# Patient Record
Sex: MALE | Race: WHITE | Employment: OTHER | URBAN - METROPOLITAN AREA
[De-identification: names, ages, dates, MRNs, and addresses within clinical notes are randomized per-mention and may not be internally consistent; named-entity substitution may affect disease eponyms.]

---

## 2017-01-09 ENCOUNTER — HOSPITAL ENCOUNTER (OUTPATIENT)
Dept: LAB | Facility: CLINIC | Age: 82
Discharge: HOME | End: 2017-01-09
Attending: UROLOGY | Admitting: UROLOGY
Payer: COMMERCIAL

## 2017-01-09 DIAGNOSIS — C61 PROSTATE CANCER (H): ICD-10-CM

## 2017-01-09 LAB — PSA SERPL-MCNC: 0.04 UG/L (ref 0–4)

## 2017-01-09 PROCEDURE — 84153 ASSAY OF PSA TOTAL: CPT | Performed by: UROLOGY

## 2017-01-09 PROCEDURE — 36415 COLL VENOUS BLD VENIPUNCTURE: CPT | Performed by: UROLOGY

## 2017-01-11 ENCOUNTER — OFFICE VISIT (OUTPATIENT)
Dept: UROLOGY | Facility: CLINIC | Age: 82
End: 2017-01-11
Payer: COMMERCIAL

## 2017-01-11 VITALS
HEART RATE: 60 BPM | DIASTOLIC BLOOD PRESSURE: 78 MMHG | HEIGHT: 69 IN | SYSTOLIC BLOOD PRESSURE: 120 MMHG | BODY MASS INDEX: 34.36 KG/M2 | WEIGHT: 232 LBS

## 2017-01-11 DIAGNOSIS — C61 MALIGNANT NEOPLASM OF PROSTATE (H): Primary | ICD-10-CM

## 2017-01-11 PROCEDURE — 99213 OFFICE O/P EST LOW 20 MIN: CPT | Performed by: UROLOGY

## 2017-01-11 RX ORDER — DOCUSATE CALCIUM 240 MG
240 CAPSULE ORAL DAILY PRN
COMMUNITY
Start: 2015-01-20

## 2017-01-11 NOTE — Clinical Note
1/11/2017       RE: Raul AGRAWAL Carisa  [address]  [address]  [facility] [id number]     Dear Colleague,    Thank you for referring your patient, Raul Younger, to the [facility] [place] at [facility]. Please see a copy of my visit note below.    Office Visit Note  [facility]  [phone]    UROLOGIC DIAGNOSES:   Stanley 4+4=8 prostate cancer    CURRENT INTERVENTIONS:   S/P prostate cryoablation, on flomax and finasteride    HISTORY:   This is a 81-year-old gentleman who previously saw Dr. Nicolas and was treated with cryoablation of the prostate for low volume Melia 4+4 = 8 prostate cancer in 2010.  He has done well with that procedure.  His PSA was undetectable until July 2016 when it became 0.05. Currently it is 0.04. He has stopped taking flomax and has seen no difference in his urinary symptoms. He continues to take finasteride. He has no urinary complaints at this time.      PAST MEDICAL HISTORY:   Past Medical History   Diagnosis Date     Other and unspecified hyperlipidemia      Abstracted 5/15/02.     Migraine, unspecified, without mention of intractable migraine without mention of status migrainosus      Abstracted 5/15/02.     Sleep apnea      Other and unspecified hyperlipidemia      MI, old      vs. asthma per pt.'s wife     Arthritis      HTN      Malignant neoplasm (H) 01/2011     Prostate cancer       PAST SURGICAL HISTORY:   Past Surgical History   Procedure Laterality Date     C nonspecific procedure  1996     Paraesophageal Hernia Repair. Abstracted 5/15/02.     C nonspecific procedure  1943     Tonsillectomy. Abstracted 5/15/02.     Prostate surgery  1/2010     Genitourinary surgery       Orthopedic surgery       Head & neck surgery       Ent surgery       Phacoemulsification clear cornea with toric intraocular lens implant  5/23/2012     Procedure:PHACOEMULSIFICATION CLEAR CORNEA WITH  TORIC INTRAOCULAR LENS IMPLANT; RIGHT PHACOEMULSIFICATION CLEAR CORNEA WITH  DELUXE TORIC INTRAOCULAR LENS IMPLANT; Surgeon:ANDRA LAGUNAS; Location: [facility]     Phacoemulsification clear cornea with toric intraocular lens implant  5/30/2012     Procedure:PHACOEMULSIFICATION CLEAR CORNEA WITH TORIC INTRAOCULAR LENS IMPLANT; LEFT PHACOEMULSIFICATION CLEAR CORNEA WITH TORIC INTRAOCULAR LENS IMPLANT ; Surgeon:ANDRA LAGUNAS; Location: [facility]     Vitrectomy parsplana with 23 gauge system  1/11/2013     Procedure: VITRECTOMY PARSPLANA WITH 23 GAUGE SYSTEM;  LEFT 23 GAUGE VITRECTOMY, EPIRETINAL MEMBRANE REMOVAL, AIR FLUID EXCHANGE ;  Surgeon: Lawson Celeste MD;  Location:  [facility]     Vitrectomy parsplana with 23 gauge system  2/4/2014     Procedure: VITRECTOMY PARSPLANA WITH 23 GAUGE SYSTEM;  RIGHT VITRECTOMY PARSPLANA WITH 23 GAUGE SYSTEM, EPIRETINAL MEMBRANE REMOVAL, AIR/FLUID EXCHANGE ;  Surgeon: Lawson Celeste MD;  Location:  [facility]     Irrigation and debridement hip, combined Left 11/7/2015     Procedure: COMBINED IRRIGATION AND DEBRIDEMENT HIP;  Surgeon: Stanislav Maharaj MD;  Location: [facility]       FAMILY HISTORY:   Family History   Problem Relation Age of Onset     Neurologic Disorder Father      als     C.A.D. Father      HEART DISEASE Father        SOCIAL HISTORY:   Social History   Substance Use Topics     Smoking status: Former Smoker -- 1.00 packs/day for 10 years     Types: Cigarettes, Pipe     Quit date: 01/01/1972     Smokeless tobacco: Never Used     Alcohol Use: No      Comment: occ wine       Current Outpatient Prescriptions   Medication     docusate calcium (SURFAK) 240 MG capsule     finasteride (PROSCAR) 5 MG tablet     amLODIPine (NORVASC) 5 MG tablet     triamterene-hydrochlorothiazide (MAXZIDE-25) 37.5-25 MG per tablet     simvastatin (ZOCOR) 20 MG tablet     tiZANidine (ZANAFLEX) 4 MG tablet     InFLIXimab (REMICADE IV)     clotrimazole (LOTRIMIN) 1 % cream     acetaminophen (TYLENOL) 500 MG  tablet     methotrexate 2.5 MG tablet     fexofenadine (ALLEGRA) 180 MG tablet     Multiple Vitamins-Minerals (PRESERVISION AREDS PO)     aspirin 81 MG tablet     Menthol, Topical Analgesic, 2 % GEL     naproxen sodium (ALEVE) 220 MG tablet     traMADol (ULTRAM) 50 MG tablet     tamsulosin (FLOMAX) 0.4 MG 24 hr capsule     folic acid (FOLVITE) 1 MG tablet     finasteride (PROSCAR) 5 MG tablet     No current facility-administered medications for this visit.         PHYSICAL EXAM:    There were no vitals taken for this visit.    HEENT: Normocephalic and atraumatic   Cardiac: Not done  Back/Flank: Not done  CNS/PNS: Not done  Respiratory: Normal non-labored breathing  Abdomen: Soft nontender and nondistended  Peripheral Vascular: Not done  Mental Status: Not done    Penis: Not done  Scrotal Skin: Not done  Testicles: Not done  Epididymis: Not done  Digital Rectal Exam:     Cystoscopy: Not done    Imaging: None    Urinalysis: UA RESULTS:  Recent Labs   Lab Test  12/08/16   1530   COLOR  Yellow   APPEARANCE  Clear   URINEGLC  Negative   URINEBILI  Negative   URINEKETONE  Negative   SG  1.020   UBLD  Negative   URINEPH  6.5   PROTEIN  Negative   UROBILINOGEN  1.0   NITRITE  Negative   LEUKEST  Negative   RBCU  O - 2   WBCU  O - 2       PSA: 0.04    Post Void Residual:     Other labs: None today      IMPRESSION:  Low PSA    PLAN:  His PSA remains detectable but very low. I recommended that we continue to follow this closely. I will see him back in 6 months for his next PSA.    Total Time: 15 min                                      Total in Consultation: 15 min      Bhavin Celeste M.D.

## 2017-01-11 NOTE — PROGRESS NOTES
Office Visit Note  [facility]  [phone]    UROLOGIC DIAGNOSES:   [place] 4+4=8 prostate cancer    CURRENT INTERVENTIONS:   S/P prostate cryoablation, on flomax and finasteride    HISTORY:   This is a 81-year-old gentleman who previously saw Dr. Nicolas and was treated with cryoablation of the prostate for low volume [place] 4+4 = 8 prostate cancer in 2010.  He has done well with that procedure.  His PSA was undetectable until July 2016 when it became 0.05. Currently it is 0.04. He has stopped taking flomax and has seen no difference in his urinary symptoms. He continues to take finasteride. He has no urinary complaints at this time.      PAST MEDICAL HISTORY:   Past Medical History   Diagnosis Date     Other and unspecified hyperlipidemia      Abstracted 5/15/02.     Migraine, unspecified, without mention of intractable migraine without mention of status migrainosus      Abstracted 5/15/02.     Sleep apnea      Other and unspecified hyperlipidemia      MI, old      vs. asthma per pt.'s wife     Arthritis      HTN      Malignant neoplasm (H) 01/2011     Prostate cancer       PAST SURGICAL HISTORY:   Past Surgical History   Procedure Laterality Date     C nonspecific procedure  1996     Paraesophageal Hernia Repair. Abstracted 5/15/02.     C nonspecific procedure  1943     Tonsillectomy. Abstracted 5/15/02.     Prostate surgery  1/2010     Genitourinary surgery       Orthopedic surgery       Head & neck surgery       Ent surgery       Phacoemulsification clear cornea with toric intraocular lens implant  5/23/2012     Procedure:PHACOEMULSIFICATION CLEAR CORNEA WITH TORIC INTRAOCULAR LENS IMPLANT; RIGHT PHACOEMULSIFICATION CLEAR CORNEA WITH  DELUXE TORIC INTRAOCULAR LENS IMPLANT; Surgeon:ANDRA LAGUNAS; Location:[facility]     Phacoemulsification clear cornea with toric intraocular lens implant  5/30/2012     Procedure:PHACOEMULSIFICATION CLEAR CORNEA WITH TORIC INTRAOCULAR LENS IMPLANT; LEFT  PHACOEMULSIFICATION CLEAR CORNEA WITH TORIC INTRAOCULAR LENS IMPLANT ; Surgeon:ANDRA LAGUNAS; Location: [facility]     Vitrectomy parsplana with 23 gauge system  1/11/2013     Procedure: VITRECTOMY PARSPLANA WITH 23 GAUGE SYSTEM;  LEFT 23 GAUGE VITRECTOMY, EPIRETINAL MEMBRANE REMOVAL, AIR FLUID EXCHANGE ;  Surgeon: Lawson Celeste MD;  Location: [facility]     Vitrectomy parsplana with 23 gauge system  2/4/2014     Procedure: VITRECTOMY PARSPLANA WITH 23 GAUGE SYSTEM;  RIGHT VITRECTOMY PARSPLANA WITH 23 GAUGE SYSTEM, EPIRETINAL MEMBRANE REMOVAL, AIR/FLUID EXCHANGE ;  Surgeon: Lawson Celeste MD;  Location:  [facility]     Irrigation and debridement hip, combined Left 11/7/2015     Procedure: COMBINED IRRIGATION AND DEBRIDEMENT HIP;  Surgeon: Stanislav Maharaj MD;  Location: [facility]       FAMILY HISTORY:   Family History   Problem Relation Age of Onset     Neurologic Disorder Father      als     C.A.D. Father      HEART DISEASE Father        SOCIAL HISTORY:   Social History   Substance Use Topics     Smoking status: Former Smoker -- 1.00 packs/day for 10 years     Types: Cigarettes, Pipe     Quit date: 01/01/1972     Smokeless tobacco: Never Used     Alcohol Use: No      Comment: occ wine       Current Outpatient Prescriptions   Medication     docusate calcium (SURFAK) 240 MG capsule     finasteride (PROSCAR) 5 MG tablet     amLODIPine (NORVASC) 5 MG tablet     triamterene-hydrochlorothiazide (MAXZIDE-25) 37.5-25 MG per tablet     simvastatin (ZOCOR) 20 MG tablet     tiZANidine (ZANAFLEX) 4 MG tablet     InFLIXimab (REMICADE IV)     clotrimazole (LOTRIMIN) 1 % cream     acetaminophen (TYLENOL) 500 MG tablet     methotrexate 2.5 MG tablet     fexofenadine (ALLEGRA) 180 MG tablet     Multiple Vitamins-Minerals (PRESERVISION AREDS PO)     aspirin 81 MG tablet     Menthol, Topical Analgesic, 2 % GEL     naproxen sodium (ALEVE) 220 MG tablet     traMADol (ULTRAM) 50 MG tablet     tamsulosin (FLOMAX) 0.4 MG 24 hr capsule     folic acid  (FOLVITE) 1 MG tablet     finasteride (PROSCAR) 5 MG tablet     No current facility-administered medications for this visit.         PHYSICAL EXAM:    There were no vitals taken for this visit.    HEENT: Normocephalic and atraumatic   Cardiac: Not done  Back/Flank: Not done  CNS/PNS: Not done  Respiratory: Normal non-labored breathing  Abdomen: Soft nontender and nondistended  Peripheral Vascular: Not done  Mental Status: Not done    Penis: Not done  Scrotal Skin: Not done  Testicles: Not done  Epididymis: Not done  Digital Rectal Exam:     Cystoscopy: Not done    Imaging: None    Urinalysis: UA RESULTS:  Recent Labs   Lab Test  12/08/16   1530   COLOR  Yellow   APPEARANCE  Clear   URINEGLC  Negative   URINEBILI  Negative   URINEKETONE  Negative   SG  1.020   UBLD  Negative   URINEPH  6.5   PROTEIN  Negative   UROBILINOGEN  1.0   NITRITE  Negative   LEUKEST  Negative   RBCU  O - 2   WBCU  O - 2       PSA: 0.04    Post Void Residual:     Other labs: None today      IMPRESSION:  Low PSA    PLAN:  His PSA remains detectable but very low. I recommended that we continue to follow this closely. I will see him back in 6 months for his next PSA.    Total Time: 15 min                                      Total in Consultation: 15 min      Bhavin Celeste M.D.

## 2017-03-22 ENCOUNTER — OFFICE VISIT (OUTPATIENT)
Dept: INTERNAL MEDICINE | Facility: CLINIC | Age: 82
End: 2017-03-22
Payer: COMMERCIAL

## 2017-03-22 ENCOUNTER — MEDICAL CORRESPONDENCE (OUTPATIENT)
Dept: HEALTH INFORMATION MANAGEMENT | Facility: CLINIC | Age: 82
End: 2017-03-22

## 2017-03-22 VITALS
WEIGHT: 232 LBS | BODY MASS INDEX: 34.36 KG/M2 | RESPIRATION RATE: 14 BRPM | HEART RATE: 71 BPM | OXYGEN SATURATION: 97 % | DIASTOLIC BLOOD PRESSURE: 68 MMHG | TEMPERATURE: 98.2 F | HEIGHT: 69 IN | SYSTOLIC BLOOD PRESSURE: 128 MMHG

## 2017-03-22 DIAGNOSIS — C61 PROSTATE CANCER (H): ICD-10-CM

## 2017-03-22 DIAGNOSIS — B35.6 TINEA CRURIS: ICD-10-CM

## 2017-03-22 DIAGNOSIS — Z00.00 ROUTINE GENERAL MEDICAL EXAMINATION AT A HEALTH CARE FACILITY: Primary | ICD-10-CM

## 2017-03-22 DIAGNOSIS — M06.9 RHEUMATOID ARTHRITIS INVOLVING MULTIPLE SITES, UNSPECIFIED RHEUMATOID FACTOR PRESENCE: ICD-10-CM

## 2017-03-22 DIAGNOSIS — G47.33 OSA (OBSTRUCTIVE SLEEP APNEA): ICD-10-CM

## 2017-03-22 DIAGNOSIS — I10 ESSENTIAL HYPERTENSION, BENIGN: ICD-10-CM

## 2017-03-22 DIAGNOSIS — E78.5 HYPERLIPIDEMIA LDL GOAL <130: ICD-10-CM

## 2017-03-22 LAB
ERYTHROCYTE [DISTWIDTH] IN BLOOD BY AUTOMATED COUNT: 14.6 % (ref 10–15)
HCT VFR BLD AUTO: 38.6 % (ref 40–53)
HGB BLD-MCNC: 12.6 G/DL (ref 13.3–17.7)
MCH RBC QN AUTO: 30.7 PG (ref 26.5–33)
MCHC RBC AUTO-ENTMCNC: 32.6 G/DL (ref 31.5–36.5)
MCV RBC AUTO: 94 FL (ref 78–100)
PLATELET # BLD AUTO: 222 10E9/L (ref 150–450)
RBC # BLD AUTO: 4.11 10E12/L (ref 4.4–5.9)
WBC # BLD AUTO: 5.4 10E9/L (ref 4–11)

## 2017-03-22 PROCEDURE — 80053 COMPREHEN METABOLIC PANEL: CPT | Performed by: INTERNAL MEDICINE

## 2017-03-22 PROCEDURE — 99397 PER PM REEVAL EST PAT 65+ YR: CPT | Performed by: INTERNAL MEDICINE

## 2017-03-22 PROCEDURE — 84443 ASSAY THYROID STIM HORMONE: CPT | Performed by: INTERNAL MEDICINE

## 2017-03-22 PROCEDURE — 85027 COMPLETE CBC AUTOMATED: CPT | Performed by: INTERNAL MEDICINE

## 2017-03-22 PROCEDURE — 80061 LIPID PANEL: CPT | Performed by: INTERNAL MEDICINE

## 2017-03-22 PROCEDURE — 36415 COLL VENOUS BLD VENIPUNCTURE: CPT | Performed by: INTERNAL MEDICINE

## 2017-03-22 RX ORDER — CLOTRIMAZOLE 1 %
CREAM (GRAM) TOPICAL 2 TIMES DAILY
Qty: 30 G | Refills: 1 | Status: SHIPPED | OUTPATIENT
Start: 2017-03-22 | End: 2017-09-20

## 2017-03-22 NOTE — PATIENT INSTRUCTIONS
Preventive Health Recommendations:       Male Ages 65 and over    Yearly exam:             See your health care provider every year in order to  o   Review health changes.   o   Discuss preventive care.    o   Review your medicines if your doctor has prescribed any.    Talk with your health care provider about whether you should have a test to screen for prostate cancer (PSA).    Every 3 years, have a diabetes test (fasting glucose). If you are at risk for diabetes, you should have this test more often.    Every 5 years, have a cholesterol test. Have this test more often if you are at risk for high cholesterol or heart disease.     Every 10 years, have a colonoscopy. Or, have a yearly FIT test (stool test). These exams will check for colon cancer.    Talk to with your health care provider about screening for Abdominal Aortic Aneurysm if you have a family history of AAA or have a history of smoking.  Shots:     Get a flu shot each year.     Get a tetanus shot every 10 years.     Talk to your doctor about your pneumonia vaccines. There are now two you should receive - Pneumovax (PPSV 23) and Prevnar (PCV 13).    Talk to your doctor about a shingles vaccine.     Talk to your doctor about the hepatitis B vaccine.  Nutrition:     Eat at least 5 servings of fruits and vegetables each day.     Eat whole-grain bread, whole-wheat pasta and brown rice instead of white grains and rice.     Talk to your doctor about Calcium and Vitamin D.   Lifestyle    Exercise for at least 150 minutes a week (30 minutes a day, 5 days a week). This will help you control your weight and prevent disease.     Limit alcohol to one drink per day.     No smoking.     Wear sunscreen to prevent skin cancer.     See your dentist every six months for an exam and cleaning.     See your eye doctor every 1 to 2 years to screen for conditions such as glaucoma, macular degeneration and cataracts.      Everything looks fine!    Refills of medications have  been faxed to your pharmacy.     I'll get back to you with lab results soon, especially if there is anything of concern.      See you in a year, sooner if problems.

## 2017-03-22 NOTE — PROGRESS NOTES
SUBJECTIVE:                                                            Raul Younger is a 81 year old male who presents for Preventive Visit.    Specialists:   He sees Dr. Roach for his RA.  He sees Dr. Celeste for his prostate cancer every 6 months.     RA: He notes his RA started in his wrists and traveled to his left hip. He expresses some interest in considering a second rheumatology opinion.   Medical Hx: His last hospitalization was in November 11/2015 for a RA flare up in the left hip.   Exercise: He participates in structured exercises 3 days/week and the other 3 days he uses the bike or treadmill.   Other: Patient uses Vicks on his feet in order to help with his sinus problems and on his toenail for a fungus.   Are you in the first 12 months of your Medicare Part B coverage?  No    Healthy Habits:    Do you get at least three servings of calcium containing foods daily (dairy, green leafy vegetables, etc.)? yes    Amount of exercise or daily activities, outside of work: 6 day(s) per week    Problems taking medications regularly No    Medication side effects: No    Have you had an eye exam in the past two years? yes    Do you see a dentist twice per year? yes    Do you have sleep apnea, excessive snoring or daytime drowsiness?yes, uses CPAP    COGNITIVE SCREEN  1) Repeat 3 items (Banana, Sunrise, Chair)    2) Clock draw: NORMAL  3) 3 item recall: Recalls 3 objects  Results: 3 items recalled: COGNITIVE IMPAIRMENT LESS LIKELY    Mini-CogTM Copyright S Trever. Licensed by the author for use in [facility]; reprinted with permission (sarah [email or web address]). All rights reserved.      Reviewed and updated as needed this visit by clinical staff  Tobacco  Allergies  Meds  Med Hx  Surg Hx  Fam Hx  Soc Hx        Reviewed and updated as needed this visit by Provider    Social History   Substance Use Topics     Smoking status: Former Smoker     Packs/day: 1.00     Years: 10.00     Types:  Cigarettes, Pipe     Quit date: 1/1/1972     Smokeless tobacco: Never Used     Alcohol use No      Comment: occ wine       The patient does not drink >3 drinks per day nor >7 drinks per week.    Today's PHQ-2 Score:   PHQ-2 ( 1999 Pfizer) 12/1/2016 11/30/2016   Q1: Little interest or pleasure in doing things 0 0   Q2: Feeling down, depressed or hopeless 0 0   PHQ-2 Score 0 0     Do you feel safe in your environment - Yes    Do you have a Health Care Directive?: Yes: Patient states has Advance Directive and will bring in a copy to clinic.    Current providers sharing in care for this patient include:   Patient Care Team:  Wilmer [facility] as PCP - General (Internal Medicine)      Hearing impairment: Yes    Ability to successfully perform activities of daily living: Yes, no assistance needed     Fall risk:  Fallen 2 or more times in the past year?: No  Any fall with injury in the past year?: No    Home safety:  none identified    The following health maintenance items are reviewed in Epic and correct as of today:  Health Maintenance   Topic Date Due     ADVANCE DIRECTIVE PLANNING Q5 YRS (NO INBASKET)  12/08/2016     FALL RISK ASSESSMENT  03/08/2017     INFLUENZA VACCINE (SYSTEM ASSIGNED)  09/01/2017     TETANUS IMMUNIZATION (SYSTEM ASSIGNED)  01/09/2018     LIPID SCREEN Q5 YR MALE (SYSTEM ASSIGNED)  03/08/2021     PNEUMOCOCCAL  Completed     ROS:  C: NEGATIVE for fever, chills, difficulty sleeping, fatigue   I: NEGATIVE for worrisome rashes, moles or lesions  E: NEGATIVE for vision changes or irritation  E/M: NEGATIVE for ear, mouth and throat problems  R: NEGATIVE for significant cough or SOB  CV: NEGATIVE for chest pain, palpitations or peripheral edema  GI: NEGATIVE for hematochezia, abdominal pain, heartburn, or change in bowel habits  : NEGATIVE for frequency, dysuria, or hematuria  M: POSITIVE for RA (specifically in wrists and left hip) NEGATIVE for other significant arthralgias or myalgia  N: POSITIVE  "for low grade headaches 1x/week. NEGATIVE for weakness, dizziness or paresthesias, abrupt changes in speech  E: NEGATIVE for temperature intolerance, skin/hair changes, unusual thirstiness   H: NEGATIVE for bleeding or bruising problems  P: NEGATIVE for changes in mood or affect    Problem list, Medication list, Allergies, and Medical/Social/Surgical histories reviewed in [facility] and updated as appropriate.  BP Readings from Last 3 Encounters:   03/22/17 128/68   01/11/17 120/78   12/08/16 128/68    Wt Readings from Last 3 Encounters:   03/22/17 105.2 kg (232 lb)   01/11/17 105.2 kg (232 lb)   12/08/16 108.9 kg (240 lb)          Past/recent records reviewed and discussed for --   Colonoscopy- Last was 12/29/2011.  Medications      OBJECTIVE:                                                            /68 (BP Location: Right arm, Patient Position: Chair, Cuff Size: Adult Large)  Pulse 71  Temp 98.2  F (36.8  C) (Oral)  Resp 14  Ht 5' 9\" (1.753 m)  Wt 232 lb (105.2 kg)  SpO2 97%  BMI 34.26 kg/m2 Estimated body mass index is 34.26 kg/(m^2) as calculated from the following:    Height as of this encounter: 5' 9\" (1.753 m).    Weight as of this encounter: 232 lb (105.2 kg).  EXAM:   GENERAL: healthy, alert and no distress, overweight   EYES: Eyes grossly normal to inspection, PERRL and conjunctivae and sclerae normal  HENT: ear canals and TM's normal, nose and mouth without ulcers or lesions  NECK: no adenopathy, no asymmetry, masses, or scars and thyroid normal to palpation  RESP: lungs clear to auscultation - no rales, rhonchi or wheezes  CV: regular rate and rhythm, normal S1 S2, no S3 or S4, no murmur, click or rub, no peripheral edema and peripheral pulses strong  ABDOMEN: soft, nontender, no hepatosplenomegaly, no masses and bowel sounds normal   (male) and RECTAL: deferred to urologist   MS: no gross musculoskeletal defects noted, no edema  SKIN: no suspicious lesions or rashes  NEURO: Normal strength " and tone, mentation intact and speech normal  PSYCH: mentation appears normal, affect normal/bright  LYMPH: no cervical or inguinal adenopathy    ASSESSMENT / PLAN:                                                            (Z00.00) Routine general medical examination at a health care facility  (primary encounter diagnosis)  Comment: Stable health. See epic orders.  Plan: CBC with platelets, TSH with free T4 reflex,         Comprehensive metabolic panel (BMP + Alb, Alk         Phos, ALT, AST, Total. Bili, TP), Lipid Profile        with reflex to direct LDL           (G47.33) MIGUEL (obstructive sleep apnea)  Comment: Continue wearing CPAP machine    (C61) Prostate cancer (H)  Comment: Continue following Urology (Dr. Celeste)     (M06.9) Rheumatoid arthritis involving multiple sites, unspecified rheumatoid factor presence (H)  Offered contact information in case patient is interested in a second opinion.   Plan: RHEUMATOLOGY REFERRAL            (E78.5) Hyperlipidemia LDL goal <130  Comment: Update lipids. Continue current meds  Plan: Comprehensive metabolic panel (BMP + Alb, Alk         Phos, ALT, AST, Total. Bili, TP), Lipid Profile        with reflex to direct LDL            (I10) Essential hypertension, benign  Comment: BP at target. Continue current meds.  Plan: amLODIPine (NORVASC) 5 Mg tablet, triamterene-hydrochlorothiazide (MAXZIDE- 25) 37.5-25 MG per tablet)     (B35.6) Tinea cruris  Plan: clotrimazole (LOTRIMIN) 1 % cream          Patient Instructions     Preventive Health Recommendations:       Male Ages 65 and over    Yearly exam:             See your health care provider every year in order to  o   Review health changes.   o   Discuss preventive care.    o   Review your medicines if your doctor has prescribed any.    Talk with your health care provider about whether you should have a test to screen for prostate cancer (PSA).    Every 3 years, have a diabetes test (fasting glucose). If you are at risk for  diabetes, you should have this test more often.    Every 5 years, have a cholesterol test. Have this test more often if you are at risk for high cholesterol or heart disease.     Every 10 years, have a colonoscopy. Or, have a yearly FIT test (stool test). These exams will check for colon cancer.    Talk to with your health care provider about screening for Abdominal Aortic Aneurysm if you have a family history of AAA or have a history of smoking.  Shots:     Get a flu shot each year.     Get a tetanus shot every 10 years.     Talk to your doctor about your pneumonia vaccines. There are now two you should receive - Pneumovax (PPSV 23) and Prevnar (PCV 13).    Talk to your doctor about a shingles vaccine.     Talk to your doctor about the hepatitis B vaccine.  Nutrition:     Eat at least 5 servings of fruits and vegetables each day.     Eat whole-grain bread, whole-wheat pasta and brown rice instead of white grains and rice.     Talk to your doctor about Calcium and Vitamin D.   Lifestyle    Exercise for at least 150 minutes a week (30 minutes a day, 5 days a week). This will help you control your weight and prevent disease.     Limit alcohol to one drink per day.     No smoking.     Wear sunscreen to prevent skin cancer.     See your dentist every six months for an exam and cleaning.     See your eye doctor every 1 to 2 years to screen for conditions such as glaucoma, macular degeneration and cataracts.    Everything looks fine!    Refills of medications have been faxed to your pharmacy.     I'll get back to you with lab results soon, especially if there is anything of concern.      See you in a year, sooner if problems.      End of Life Planning:  Patient currently has an advanced directive: Yes. Patient has advanced directive and will bring copy to clinic.     COUNSELING:  Reviewed preventive health counseling, as reflected in patient instructions       Regular exercise       Colon cancer screening    Estimated  "body mass index is 34.26 kg/(m^2) as calculated from the following:    Height as of this encounter: 1.753 m (5' 9\").    Weight as of this encounter: 105.2 kg (232 lb).  Weight management plan: Discussed healthy diet and exercise guidelines and patient will follow up in 12 months in clinic to re-evaluate.   reports that he quit smoking about 45 years ago. His smoking use included Cigarettes and Pipe. He has a 10.00 pack-year smoking history. He has never used smokeless tobacco.    Appropriate preventive services were discussed with this patient, including applicable screening as appropriate for cardiovascular disease, diabetes, osteopenia/osteoporosis, and glaucoma.  As appropriate for age/gender, discussed screening for colorectal cancer, prostate cancer, breast cancer, and cervical cancer. Checklist reviewing preventive services available has been given to the patient.    Reviewed patients plan of care and provided an AVS. The Basic Care Plan (routine screening as documented in Health Maintenance) for Raul meets the Care Plan requirement. This Care Plan has been established and reviewed with the Patient.    Counseling Resources:  ATP IV Guidelines  Pooled Cohorts Equation Calculator  Breast Cancer Risk Calculator  FRAX Risk Assessment  ICSI Preventive Guidelines  Dietary Guidelines for Americans, 2010  USDA's MyPlate  ASA Prophylaxis  Lung CA Screening    Jose D Vasquez MD  [facility]    This document serves as a record of the services and decisions personally performed and made by Jose D Vasquez MD. It was created on their behalf by Yue Martin, a trained medical scribe. The creation of this document is based the provider's statements to the medical scribe.  Yue Martin March 22, 2017 9:16 AM    "

## 2017-03-22 NOTE — MR AVS SNAPSHOT
After Visit Summary   3/22/2017    Raul Younger    MRN: [id number]           Patient Information     Date Of Birth          1935        Visit Information        Provider Department      3/22/2017 9:00 AM Jose D Vasquez MD [facility]        Today's Diagnoses     Routine general medical examination at a health care facility    -  1    MIGUEL (obstructive sleep apnea)        Prostate cancer (H)        Rheumatoid arthritis involving multiple sites, unspecified rheumatoid factor presence (H)        Hyperlipidemia LDL goal <130        Essential hypertension, benign        Tinea cruris          Care Instructions      Preventive Health Recommendations:       Male Ages 65 and over    Yearly exam:             See your health care provider every year in order to  o   Review health changes.   o   Discuss preventive care.    o   Review your medicines if your doctor has prescribed any.    Talk with your health care provider about whether you should have a test to screen for prostate cancer (PSA).    Every 3 years, have a diabetes test (fasting glucose). If you are at risk for diabetes, you should have this test more often.    Every 5 years, have a cholesterol test. Have this test more often if you are at risk for high cholesterol or heart disease.     Every 10 years, have a colonoscopy. Or, have a yearly FIT test (stool test). These exams will check for colon cancer.    Talk to with your health care provider about screening for Abdominal Aortic Aneurysm if you have a family history of AAA or have a history of smoking.  Shots:     Get a flu shot each year.     Get a tetanus shot every 10 years.     Talk to your doctor about your pneumonia vaccines. There are now two you should receive - Pneumovax (PPSV 23) and Prevnar (PCV 13).    Talk to your doctor about a shingles vaccine.     Talk to your doctor about the hepatitis B vaccine.  Nutrition:     Eat at least 5 servings of fruits and  vegetables each day.     Eat whole-grain bread, whole-wheat pasta and brown rice instead of white grains and rice.     Talk to your doctor about Calcium and Vitamin D.   Lifestyle    Exercise for at least 150 minutes a week (30 minutes a day, 5 days a week). This will help you control your weight and prevent disease.     Limit alcohol to one drink per day.     No smoking.     Wear sunscreen to prevent skin cancer.     See your dentist every six months for an exam and cleaning.     See your eye doctor every 1 to 2 years to screen for conditions such as glaucoma, macular degeneration and cataracts.      Everything looks fine!    Refills of medications have been faxed to your pharmacy.     I'll get back to you with lab results soon, especially if there is anything of concern.      See you in a year, sooner if problems.          Follow-ups after your visit        Additional Services     RHEUMATOLOGY REFERRAL       Your provider has referred you to: [facility]:  [facility] [phone]  [email or web address]    Please be aware that coverage of these services is subject to the terms and limitations of your health insurance plan.  Call member services at your health plan with any benefit or coverage questions.      Please bring the following with you to your appointment:    (1) Any X-Rays, CTs or MRIs which have been performed.  Contact the facility where they were done to arrange for  prior to your scheduled appointment.    (2) List of current medications   (3) This referral request   (4) Any documents/labs given to you for this referral                  Your next 10 appointments already scheduled     Apr 17, 2017 11:30 AM CDT   Return Sleep Patient with Yoan Maria MD   [facility] ([facility])    [address] [address]   [phone]            Jul 12, 2017  "11:00 AM CDT   Return Prostate Cancer with Bhavin Celeste MD   [facility])    [address] [facility]  [address]  [facility] [address] [phone]              Who to contact     If you have questions or need follow up information about today's clinic visit or your schedule please contact [facility] directly at [phone].  Normal or non-critical lab and imaging results will be communicated to you by [employer], letter or phone within 4 business days after the clinic has received the results. If you do not hear from us within 7 days, please contact the clinic through [employer] or phone. If you have a critical or abnormal lab result, we will notify you by phone as soon as possible.  Submit refill requests through [employer] or call your pharmacy and they will forward the refill request to us. Please allow 3 business days for your refill to be completed.          Additional Information About Your Visit        [employer] Information     [employer] gives you secure access to your electronic health record. If you see a primary care provider, you can also send messages to your care team and make appointments. If you have questions, please call your primary care clinic.  If you do not have a primary care provider, please call [phone] and they will assist you.        Care EveryWhere ID     This is your Care EveryWhere ID. This could be used by other organizations to access your [place] medical records  [id number]        Your Vitals Were     Pulse Temperature Respirations Height Pulse Oximetry BMI (Body Mass Index)    71 98.2  F (36.8  C) (Oral) 14 5' 9\" (1.753 m) 97% 34.26 kg/m2       Blood Pressure from Last 3 Encounters:   03/22/17 128/68   01/11/17 120/78   12/08/16 128/68    Weight from Last 3 Encounters:   03/22/17 232 lb (105.2 kg)   01/11/17 232 lb (105.2 kg)   12/08/16 240 lb (108.9 kg)              We Performed the " Following     CBC with platelets     Comprehensive metabolic panel (BMP + Alb, Alk Phos, ALT, AST, Total. Bili, TP)     Lipid Profile with reflex to direct LDL     RHEUMATOLOGY REFERRAL     TSH with free T4 reflex          Today's Medication Changes          These changes are accurate as of: 3/22/17  9:43 AM.  If you have any questions, ask your nurse or doctor.               These medicines have changed or have updated prescriptions.        Dose/Directions    finasteride 5 MG tablet   Commonly known as:  PROSCAR   This may have changed:  Another medication with the same name was removed. Continue taking this medication, and follow the directions you see here.   Changed by:  Abdias Sharp MD        Dose:  5 mg   Take 5 mg by mouth daily.   Refills:  0            Where to get your medicines      These medications were sent to [facility] [address] [place] [address] [address] [place] [address]  [address] [facility] [address]     Phone:  [phone]     clotrimazole 1 % cream                Primary Care Provider Office Phone # Fax #    [facility] [phone]       [address] [facility].  [facility] [address]        Thank you!     Thank you for choosing [facility]  for your care. Our goal is always to provide you with excellent care. Hearing back from our patients is one way we can continue to improve our services. Please take a few minutes to complete the written survey that you may receive in the mail after your visit with us. Thank you!             Your Updated Medication List - Protect others around you: Learn how to safely use, store and throw away your medicines at www.disposemymeds.org.          This list is accurate as of: 3/22/17  9:43 AM.  Always use your most recent med list.                   Brand Name Dispense Instructions for use    acetaminophen 500 MG tablet    TYLENOL     Take 1,000 mg by mouth every 6 hours as  needed for pain       ALEVE 220 MG tablet   Generic drug:  naproxen sodium      Take by mouth as needed for moderate pain Reported on 3/22/2017       amLODIPine 5 MG tablet    NORVASC    180 tablet    Take 1 tablet (5 mg) by mouth daily       aspirin 81 MG tablet      Take 1 tablet by mouth daily.       clotrimazole 1 % cream    LOTRIMIN    30 g    Apply topically 2 times daily       docusate calcium 240 MG capsule    SURFAK         fexofenadine 180 MG tablet    ALLEGRA     Take 180 mg by mouth daily Reported on 3/22/2017       finasteride 5 MG tablet    PROSCAR     Take 5 mg by mouth daily.       folic acid 1 MG tablet    FOLVITE     Take 1 mg by mouth daily       Menthol (Topical Analgesic) 2 % Gel          methotrexate 2.5 MG tablet CHEMO      Take 25 mg by mouth once a week On Tuesdays       PRESERVISION AREDS PO      Take 1 tablet by mouth every morning       REMICADE IV      Infusion every 6 weeks       simvastatin 20 MG tablet    ZOCOR    90 tablet    Take 1 tablet (20 mg) by mouth At Bedtime       tiZANidine 4 MG tablet    ZANAFLEX    90 tablet    Take 1 tablet (4 mg) by mouth 3 times daily as needed for muscle spasms       traMADol 50 MG tablet    ULTRAM    25 tablet    Take 1 tablet (50 mg) by mouth every 6 hours as needed for moderate pain       triamterene-hydrochlorothiazide 37.5-25 MG per tablet    MAXZIDE-25    90 tablet    1 tab in am and 1/2 tab at noon

## 2017-03-22 NOTE — NURSING NOTE
"Chief Complaint   Patient presents with     Medicare Visit       Initial /68 (BP Location: Right arm, Patient Position: Chair, Cuff Size: Adult Large)  Pulse 71  Temp 98.2  F (36.8  C) (Oral)  Resp 14  Ht 5' 9\" (1.753 m)  Wt 232 lb (105.2 kg)  SpO2 97%  BMI 34.26 kg/m2 Estimated body mass index is 34.26 kg/(m^2) as calculated from the following:    Height as of this encounter: 5' 9\" (1.753 m).    Weight as of this encounter: 232 lb (105.2 kg).  Medication Reconciliation: complete   Bonita OTOOLE      "

## 2017-03-23 LAB
ALBUMIN SERPL-MCNC: 3.9 G/DL (ref 3.4–5)
ALP SERPL-CCNC: 89 U/L (ref 40–150)
ALT SERPL W P-5'-P-CCNC: 15 U/L (ref 0–70)
ANION GAP SERPL CALCULATED.3IONS-SCNC: 11 MMOL/L (ref 3–14)
AST SERPL W P-5'-P-CCNC: 8 U/L (ref 0–45)
BILIRUB SERPL-MCNC: 0.6 MG/DL (ref 0.2–1.3)
BUN SERPL-MCNC: 20 MG/DL (ref 7–30)
CALCIUM SERPL-MCNC: 9 MG/DL (ref 8.5–10.1)
CHLORIDE SERPL-SCNC: 108 MMOL/L (ref 94–109)
CHOLEST SERPL-MCNC: 152 MG/DL
CO2 SERPL-SCNC: 23 MMOL/L (ref 20–32)
CREAT SERPL-MCNC: 0.89 MG/DL (ref 0.66–1.25)
GFR SERPL CREATININE-BSD FRML MDRD: 82 ML/MIN/1.7M2
GLUCOSE SERPL-MCNC: 91 MG/DL (ref 70–99)
HDLC SERPL-MCNC: 51 MG/DL
LDLC SERPL CALC-MCNC: 68 MG/DL
NONHDLC SERPL-MCNC: 101 MG/DL
POTASSIUM SERPL-SCNC: 3.7 MMOL/L (ref 3.4–5.3)
PROT SERPL-MCNC: 6.8 G/DL (ref 6.8–8.8)
SODIUM SERPL-SCNC: 142 MMOL/L (ref 133–144)
TRIGL SERPL-MCNC: 165 MG/DL
TSH SERPL DL<=0.005 MIU/L-ACNC: 0.95 MU/L (ref 0.4–4)

## 2017-04-17 ENCOUNTER — TELEPHONE (OUTPATIENT)
Dept: INTERNAL MEDICINE | Facility: CLINIC | Age: 82
End: 2017-04-17

## 2017-04-17 DIAGNOSIS — I10 ESSENTIAL HYPERTENSION, BENIGN: ICD-10-CM

## 2017-04-17 DIAGNOSIS — R60.9 EDEMA, UNSPECIFIED TYPE: ICD-10-CM

## 2017-04-17 RX ORDER — TRIAMTERENE/HYDROCHLOROTHIAZID 37.5-25 MG
TABLET ORAL
Qty: 90 TABLET | Refills: 3 | Status: SHIPPED | OUTPATIENT
Start: 2017-04-17 | End: 2017-04-25

## 2017-04-17 NOTE — TELEPHONE ENCOUNTER
Reason for Call:  Medication or medication refill:    Do you use a [facility]?  Name of the pharmacy and phone number for the current request:  [facility]    Name of the medication requested: triamterene-hydrochlorothiazide (MAXZIDE-25) 37.5-25 MG per tablet    Other request: Pt is calling stating [facility] has this as 1 per day, when it should be 1 1/2 per day.  He is stating he needs a new Rx. Pt would like a call when this is complete.  Please advise    Can we leave a detailed message on this number? YES    Phone number patient can be reached at: Home number on file [phone] (home)    Best Time: anytime    Call taken on 4/17/2017 at 9:52 AM by Nanci Duran

## 2017-04-17 NOTE — TELEPHONE ENCOUNTER
Triamterene-HCTZ dose increased by Dr. Cuevas at 11/30/16 visit for LE edema, but updated order was not sent to his pharmacy.     Triamterene-HCTZ 37.5-25 mg    Last Written Prescription Date: 11/30/16 (Historic)  Last Fill Quantity: 90, # refills: 4  Last Office Visit with [facility], [facility] or [facility] prescribing provider: 3/22/17       Potassium   Date Value Ref Range Status   03/22/2017 3.7 3.4 - 5.3 mmol/L Final     Creatinine   Date Value Ref Range Status   03/22/2017 0.89 0.66 - 1.25 mg/dL Final     BP Readings from Last 3 Encounters:   03/22/17 128/68   01/11/17 120/78   12/08/16 128/68        Prescription approved per [facility] Refill Protocol.   Pt informed updated prescription was sent to the pharmacy.

## 2017-04-25 ENCOUNTER — OFFICE VISIT (OUTPATIENT)
Dept: INTERNAL MEDICINE | Facility: CLINIC | Age: 82
End: 2017-04-25
Payer: COMMERCIAL

## 2017-04-25 ENCOUNTER — TELEPHONE (OUTPATIENT)
Dept: INTERNAL MEDICINE | Facility: CLINIC | Age: 82
End: 2017-04-25

## 2017-04-25 VITALS
DIASTOLIC BLOOD PRESSURE: 70 MMHG | BODY MASS INDEX: 33.64 KG/M2 | WEIGHT: 227.1 LBS | HEART RATE: 78 BPM | SYSTOLIC BLOOD PRESSURE: 124 MMHG | OXYGEN SATURATION: 96 % | HEIGHT: 69 IN | TEMPERATURE: 98.2 F

## 2017-04-25 DIAGNOSIS — R49.0 HOARSENESS: Primary | ICD-10-CM

## 2017-04-25 DIAGNOSIS — R60.9 EDEMA, UNSPECIFIED TYPE: ICD-10-CM

## 2017-04-25 DIAGNOSIS — I10 ESSENTIAL HYPERTENSION, BENIGN: ICD-10-CM

## 2017-04-25 PROCEDURE — 99214 OFFICE O/P EST MOD 30 MIN: CPT | Performed by: FAMILY MEDICINE

## 2017-04-25 RX ORDER — TRIAMTERENE/HYDROCHLOROTHIAZID 37.5-25 MG
2 TABLET ORAL DAILY
Qty: 90 TABLET | Refills: 3 | COMMUNITY
Start: 2017-04-25 | End: 2017-06-13

## 2017-04-25 RX ORDER — AMLODIPINE BESYLATE 5 MG/1
2.5 TABLET ORAL DAILY
Qty: 180 TABLET | Refills: 4 | COMMUNITY
Start: 2017-04-25 | End: 2018-06-08

## 2017-04-25 NOTE — TELEPHONE ENCOUNTER
Reason for Call:  Other     Detailed comments: pt would like to discuss some symptoms that he has had for the last 3 weeks. He states that he has slowly been losing his voice & feels like he has something stuck in his throat. Might possibly want to go see an ENT    Phone Number Patient can be reached at: Home number on file [phone] (home)    Best Time: anytime    Can we leave a detailed message on this number? YES    Call taken on 4/25/2017 at 10:14 AM by Stephanie Avila

## 2017-04-25 NOTE — MR AVS SNAPSHOT
After Visit Summary   4/25/2017    Raul Younger    MRN: [id number]           Patient Information     Date Of Birth          1935        Visit Information        Provider Department      4/25/2017 1:00 PM Clay Geller MD [facility]        Today's Diagnoses     Hoarseness    -  1    BENIGN HYPERTENSION        Edema, unspecified type          Care Instructions      See ENT    Note medication changes.        Follow-ups after your visit        Additional Services     OTOLARYNGOLOGY REFERRAL       Your provider has referred you to: N: [facility] [place] [phone]   [email or web address]    Please be aware that coverage of these services is subject to the terms and limitations of your health insurance plan.  Call member services at your health plan with any benefit or coverage questions.      Please bring the following with you to your appointment:    (1) Any X-Rays, CTs or MRIs which have been performed.  Contact the facility where they were done to arrange for  prior to your scheduled appointment.   (2) List of current medications  (3) This referral request   (4) Any documents/labs given to you for this referral                  Your next 10 appointments already scheduled     Jul 12, 2017 11:00 AM CDT   Return Prostate Cancer with Bhavin Celeste MD   [facility] [place] ([facility])    [facility] [address]  [address]  [facility] [address] [phone]              Who to contact     If you have questions or need follow up information about today's clinic visit or your schedule please contact [facility] directly at [phone].  Normal or non-critical lab and imaging results will be communicated to you by MyChart, letter or phone within 4 business days after the clinic has received the results. If you do not hear from us within 7 days,  "please contact the clinic through [employer] or phone. If you have a critical or abnormal lab result, we will notify you by phone as soon as possible.  Submit refill requests through [employer] or call your pharmacy and they will forward the refill request to us. Please allow 3 business days for your refill to be completed.          Additional Information About Your Visit        [employer] Information     [employer] gives you secure access to your electronic health record. If you see a primary care provider, you can also send messages to your care team and make appointments. If you have questions, please call your primary care clinic.  If you do not have a primary care provider, please call [phone] and they will assist you.        Care EveryWhere ID     This is your Care EveryWhere ID. This could be used by other organizations to access your [place] medical records  [id number]        Your Vitals Were     Pulse Temperature Height Pulse Oximetry BMI (Body Mass Index)       78 98.2  F (36.8  C) (Oral) 5' 9\" (1.753 m) 96% 33.54 kg/m2        Blood Pressure from Last 3 Encounters:   04/25/17 124/70   03/22/17 128/68   01/11/17 120/78    Weight from Last 3 Encounters:   04/25/17 227 lb 1.6 oz (103 kg)   03/22/17 232 lb (105.2 kg)   01/11/17 232 lb (105.2 kg)              We Performed the Following     OTOLARYNGOLOGY REFERRAL          Today's Medication Changes          These changes are accurate as of: 4/25/17  1:28 PM.  If you have any questions, ask your nurse or doctor.               These medicines have changed or have updated prescriptions.        Dose/Directions    amLODIPine 5 MG tablet   Commonly known as:  NORVASC   This may have changed:  how much to take   Used for:  Essential hypertension, benign   Changed by:  Clay Geller MD        Dose:  2.5 mg   Take 0.5 tablets (2.5 mg) by mouth daily   Quantity:  180 tablet   Refills:  4       triamterene-hydrochlorothiazide 37.5-25 MG per tablet   Commonly known as:  " MAXZIDE-25   This may have changed:    - how much to take  - how to take this  - when to take this  - additional instructions   Used for:  Essential hypertension, benign, Edema, unspecified type   Changed by:  Clay Geller MD        Dose:  2 tablet   Take 2 tablets by mouth daily   Quantity:  90 tablet   Refills:  3                Primary Care Provider Office Phone # Fax #    Jose D Vasquez [phone]       [facility] [address] [facility] [address]  [facility] [address]        Thank you!     Thank you for choosing [facility]  for your care. Our goal is always to provide you with excellent care. Hearing back from our patients is one way we can continue to improve our services. Please take a few minutes to complete the written survey that you may receive in the mail after your visit with us. Thank you!             Your Updated Medication List - Protect others around you: Learn how to safely use, store and throw away your medicines at www.disposemymeds.org.          This list is accurate as of: 4/25/17  1:28 PM.  Always use your most recent med list.                   Brand Name Dispense Instructions for use    acetaminophen 500 MG tablet    TYLENOL     Take 1,000 mg by mouth every 6 hours as needed for pain       ALEVE 220 MG tablet   Generic drug:  naproxen sodium      Take by mouth as needed for moderate pain Reported on 3/22/2017       amLODIPine 5 MG tablet    NORVASC    180 tablet    Take 0.5 tablets (2.5 mg) by mouth daily       aspirin 81 MG tablet      Take 1 tablet by mouth daily.       clotrimazole 1 % cream    LOTRIMIN    30 g    Apply topically 2 times daily       docusate calcium 240 MG capsule    SURFAK         fexofenadine 180 MG tablet    ALLEGRA     Take 180 mg by mouth daily Reported on 3/22/2017       folic acid 1 MG tablet    FOLVITE     Take 1 mg by mouth daily       Menthol (Topical Analgesic) 2 % Gel          methotrexate 2.5 MG tablet CHEMO       Take 25 mg by mouth once a week On Tuesdays       PRESERVISION AREDS PO      Take 1 tablet by mouth every morning       REMICADE IV      Infusion every 6 weeks       simvastatin 20 MG tablet    ZOCOR    90 tablet    Take 1 tablet (20 mg) by mouth At Bedtime       triamterene-hydrochlorothiazide 37.5-25 MG per tablet    MAXZIDE-25    90 tablet    Take 2 tablets by mouth daily

## 2017-04-25 NOTE — PROGRESS NOTES
CHIEF COMPLAINT    Hoarse voice      HISTORY    Raul has a h/o hoarseness for about 3 weeks. He has no ST or sinus trouble. He does have some allergy SX and takes generic Allegra. He is not a smoker.    His father had laryngeal CA w/o a lot of risk factors.    He also still has some leg edema. His Amlodipine dose is reduced previously. He is not SOB    Patient Active Problem List   Diagnosis     Essential hypertension, benign     Chronic rhinitis     Prostate cancer (H)     HYPERLIPIDEMIA LDL GOAL <130     Advanced directives, counseling/discussion     RA (rheumatoid arthritis) (H)     History of colonic polyps     Current Outpatient Prescriptions   Medication Sig Dispense Refill     triamterene-hydrochlorothiazide (MAXZIDE-25) 37.5-25 MG per tablet Take 2 tablets by mouth daily 90 tablet 3     amLODIPine (NORVASC) 5 MG tablet Take 0.5 tablets (2.5 mg) by mouth daily 180 tablet 4     clotrimazole (LOTRIMIN) 1 % cream Apply topically 2 times daily 30 g 1     Menthol, Topical Analgesic, 2 % GEL        docusate calcium (SURFAK) 240 MG capsule        naproxen sodium (ALEVE) 220 MG tablet Take by mouth as needed for moderate pain Reported on 3/22/2017       simvastatin (ZOCOR) 20 MG tablet Take 1 tablet (20 mg) by mouth At Bedtime 90 tablet 4     InFLIXimab (REMICADE IV) Infusion every 6 weeks       acetaminophen (TYLENOL) 500 MG tablet Take 1,000 mg by mouth every 6 hours as needed for pain       methotrexate 2.5 MG tablet Take 25 mg by mouth once a week On Tuesdays       folic acid (FOLVITE) 1 MG tablet Take 1 mg by mouth daily       fexofenadine (ALLEGRA) 180 MG tablet Take 180 mg by mouth daily Reported on 3/22/2017       Multiple Vitamins-Minerals (PRESERVISION AREDS PO) Take 1 tablet by mouth every morning        aspirin 81 MG tablet Take 1 tablet by mouth daily.       [DISCONTINUED] triamterene-hydrochlorothiazide (MAXZIDE-25) 37.5-25 MG per tablet 1 tab in am and 1/2 tab at noon 90 tablet 3     [DISCONTINUED]  "amLODIPine (NORVASC) 5 MG tablet Take 1 tablet (5 mg) by mouth daily 180 tablet 4       REVIEW OF SYSTEMS    No HA  No ST  No SOB or cough  No abd pain      Past Medical History:   Diagnosis Date     Arthritis      HTN      Malignant neoplasm (H) 01/2011    Prostate cancer     MI, old     vs. asthma per pt.'s wife     Migraine, unspecified, without mention of intractable migraine without mention of status migrainosus     Abstracted 5/15/02.     Other and unspecified hyperlipidemia     Abstracted 5/15/02.     Other and unspecified hyperlipidemia      Sleep apnea        EXAM  /70 (BP Location: Right arm, Patient Position: Chair, Cuff Size: Adult Large)  Pulse 78  Temp 98.2  F (36.8  C) (Oral)  Ht 5' 9\" (1.753 m)  Wt 227 lb 1.6 oz (103 kg)  SpO2 96%  BMI 33.54 kg/m2    Voice is mildly hoarse  Pharynx: no redness or ulcerations, absent tonsils  Neck: no mass or adenopathy      (R49.0) Hoarseness  (primary encounter diagnosis)  Comment:   Plan: OTOLARYNGOLOGY REFERRAL            (I10) BENIGN HYPERTENSION  Comment:   Plan: triamterene-hydrochlorothiazide (MAXZIDE-25)         37.5-25 MG per tablet, amLODIPine (NORVASC) 5         MG tablet            (R60.9) Edema, unspecified type  Comment:   Plan: triamterene-hydrochlorothiazide (MAXZIDE-25)         37.5-25 MG per tablet            See ENT    Note medication changes.      Current Outpatient Prescriptions   Medication Sig Dispense Refill     triamterene-hydrochlorothiazide (MAXZIDE-25) 37.5-25 MG per tablet Take 2 tablets by mouth daily 90 tablet 3     amLODIPine (NORVASC) 5 MG tablet Take 0.5 tablets (2.5 mg) by mouth daily 180 tablet 4     clotrimazole (LOTRIMIN) 1 % cream Apply topically 2 times daily 30 g 1     Menthol, Topical Analgesic, 2 % GEL        docusate calcium (SURFAK) 240 MG capsule        naproxen sodium (ALEVE) 220 MG tablet Take by mouth as needed for moderate pain Reported on 3/22/2017       simvastatin (ZOCOR) 20 MG tablet Take 1 tablet (20 " mg) by mouth At Bedtime 90 tablet 4     InFLIXimab (REMICADE IV) Infusion every 6 weeks       acetaminophen (TYLENOL) 500 MG tablet Take 1,000 mg by mouth every 6 hours as needed for pain       methotrexate 2.5 MG tablet Take 25 mg by mouth once a week On Tuesdays       folic acid (FOLVITE) 1 MG tablet Take 1 mg by mouth daily       fexofenadine (ALLEGRA) 180 MG tablet Take 180 mg by mouth daily Reported on 3/22/2017       Multiple Vitamins-Minerals (PRESERVISION AREDS PO) Take 1 tablet by mouth every morning        aspirin 81 MG tablet Take 1 tablet by mouth daily.       [DISCONTINUED] triamterene-hydrochlorothiazide (MAXZIDE-25) 37.5-25 MG per tablet 1 tab in am and 1/2 tab at noon 90 tablet 3     [DISCONTINUED] amLODIPine (NORVASC) 5 MG tablet Take 1 tablet (5 mg) by mouth daily 180 tablet 4

## 2017-04-25 NOTE — TELEPHONE ENCOUNTER
Contacted pt.   He states that symptoms get worse at night, but today he woke up and they were bad right away. No cold, cough or fevers prior to symptoms starting.      Pt is taking Allegra daily. He feels like something is sticking to the back of his throat, this does not improve if he coughs or clears his throat. He occasionally gets a cough, but does not seem to be caused by the feeling in the back of his throat. Recommended appt for further evaluation. Appt scheduled today with Dr. Geller at 1pm.

## 2017-04-25 NOTE — NURSING NOTE
"Chief Complaint   Patient presents with     Hoarse     He is hoarse and loosing his voice for about 3 weeks, no sore throat, just raspy and feels like something is stuck in his throat.       Initial /70 (BP Location: Right arm, Patient Position: Chair, Cuff Size: Adult Large)  Pulse 78  Temp 98.2  F (36.8  C) (Oral)  Ht 5' 9\" (1.753 m)  Wt 227 lb 1.6 oz (103 kg)  SpO2 96%  BMI 33.54 kg/m2 Estimated body mass index is 33.54 kg/(m^2) as calculated from the following:    Height as of this encounter: 5' 9\" (1.753 m).    Weight as of this encounter: 227 lb 1.6 oz (103 kg).  Medication Reconciliation: complete   Tarsha James MA      "

## 2017-05-01 ENCOUNTER — TRANSFERRED RECORDS (OUTPATIENT)
Dept: HEALTH INFORMATION MANAGEMENT | Facility: CLINIC | Age: 82
End: 2017-05-01

## 2017-06-13 DIAGNOSIS — I10 ESSENTIAL HYPERTENSION, BENIGN: ICD-10-CM

## 2017-06-13 DIAGNOSIS — R60.9 EDEMA, UNSPECIFIED TYPE: ICD-10-CM

## 2017-06-13 RX ORDER — TRIAMTERENE/HYDROCHLOROTHIAZID 37.5-25 MG
2 TABLET ORAL DAILY
Qty: 180 TABLET | Refills: 1 | Status: SHIPPED | OUTPATIENT
Start: 2017-06-13 | End: 2017-12-15

## 2017-06-13 NOTE — TELEPHONE ENCOUNTER
Pt calls, he needs updated prescription sent for Triamterene-HCTZ. Dr. Geller increased dose to 2 tabs a day at last appt, previously taking 1.5 tabs daily.    Triamterene-HCTZ 37.5-25mg     Last Written Prescription Date: 4/25/17 (historic)  Last Fill Quantity: 90, # refills: 0  Last Office Visit with [facility] or [facility] prescribing provider: 4/25/17       Potassium   Date Value Ref Range Status   03/22/2017 3.7 3.4 - 5.3 mmol/L Final     Creatinine   Date Value Ref Range Status   03/22/2017 0.89 0.66 - 1.25 mg/dL Final     BP Readings from Last 3 Encounters:   04/25/17 124/70   03/22/17 128/68   01/11/17 120/78     Prescription approved per [facility] Refill Protocol.

## 2017-07-10 ENCOUNTER — HOSPITAL ENCOUNTER (OUTPATIENT)
Dept: GENERAL RADIOLOGY | Facility: CLINIC | Age: 82
End: 2017-07-10
Attending: UROLOGY
Payer: COMMERCIAL

## 2017-07-10 ENCOUNTER — HOSPITAL ENCOUNTER (OUTPATIENT)
Dept: LAB | Facility: CLINIC | Age: 82
Discharge: HOME | End: 2017-07-10
Attending: UROLOGY | Admitting: UROLOGY
Payer: COMMERCIAL

## 2017-07-10 DIAGNOSIS — C61 PROSTATE CANCER (H): ICD-10-CM

## 2017-07-10 DIAGNOSIS — N20.0 CALCULUS OF KIDNEY: ICD-10-CM

## 2017-07-10 DIAGNOSIS — C61 PROSTATE CANCER (H): Primary | ICD-10-CM

## 2017-07-10 LAB — PSA SERPL-MCNC: 0.21 UG/L (ref 0–4)

## 2017-07-10 PROCEDURE — 84153 ASSAY OF PSA TOTAL: CPT | Performed by: UROLOGY

## 2017-07-10 PROCEDURE — 74010 XR KUB: CPT | Mod: 52

## 2017-07-10 PROCEDURE — 36415 COLL VENOUS BLD VENIPUNCTURE: CPT | Performed by: UROLOGY

## 2017-07-12 ENCOUNTER — OFFICE VISIT (OUTPATIENT)
Dept: UROLOGY | Facility: CLINIC | Age: 82
End: 2017-07-12
Payer: COMMERCIAL

## 2017-07-12 VITALS — WEIGHT: 225 LBS | OXYGEN SATURATION: 98 % | BODY MASS INDEX: 33.33 KG/M2 | HEIGHT: 69 IN | HEART RATE: 52 BPM

## 2017-07-12 DIAGNOSIS — C61 MALIGNANT NEOPLASM OF PROSTATE (H): Primary | ICD-10-CM

## 2017-07-12 PROCEDURE — 99213 OFFICE O/P EST LOW 20 MIN: CPT | Performed by: UROLOGY

## 2017-07-12 ASSESSMENT — PAIN SCALES - GENERAL: PAINLEVEL: NO PAIN (0)

## 2017-07-12 NOTE — PROGRESS NOTES
Office Visit Note  [facility]  [phone]    UROLOGIC DIAGNOSES:   [place] 4+4 = 8 prostate cancer    CURRENT INTERVENTIONS:   S/P prostate cryoablation, on Flomax and finasteride    HISTORY:   Raul returns to clinic today for prostate cancer follow-up. His PSA has risen somewhat up to 0.21. He continues to have no urinary complaints.      PAST MEDICAL HISTORY:   Past Medical History:   Diagnosis Date     Arthritis      HTN      Malignant neoplasm (H) 01/2011    Prostate cancer     MI, old     vs. asthma per pt.'s wife     Migraine, unspecified, without mention of intractable migraine without mention of status migrainosus     Abstracted 5/15/02.     Other and unspecified hyperlipidemia     Abstracted 5/15/02.     Other and unspecified hyperlipidemia      Sleep apnea        PAST SURGICAL HISTORY:   Past Surgical History:   Procedure Laterality Date     C NONSPECIFIC PROCEDURE  1996    Paraesophageal Hernia Repair. Abstracted 5/15/02.     C NONSPECIFIC PROCEDURE  1943    Tonsillectomy. Abstracted 5/15/02.     COLONOSCOPY  12/29/2011    Diverticuli, 3 mm tissue biopsied, path showed no hyperplastic or adenomatous elements     CYSTOSCOPY       ENT SURGERY       GENITOURINARY SURGERY       HEAD & NECK SURGERY       IRRIGATION AND DEBRIDEMENT HIP, COMBINED Left 11/7/2015    Procedure: COMBINED IRRIGATION AND DEBRIDEMENT HIP;  Surgeon: Stanislav Maharaj MD;  Location:  OR     ORTHOPEDIC SURGERY       PHACOEMULSIFICATION CLEAR CORNEA WITH TORIC INTRAOCULAR LENS IMPLANT  5/23/2012    Procedure:PHACOEMULSIFICATION CLEAR CORNEA WITH TORIC INTRAOCULAR LENS IMPLANT; RIGHT PHACOEMULSIFICATION CLEAR CORNEA WITH  DELUXE TORIC INTRAOCULAR LENS IMPLANT; Surgeon:ANDRA LAGUNAS; Location:[facility]     PHACOEMULSIFICATION CLEAR CORNEA WITH TORIC INTRAOCULAR LENS IMPLANT  5/30/2012    Procedure:PHACOEMULSIFICATION CLEAR CORNEA WITH TORIC INTRAOCULAR LENS IMPLANT; LEFT PHACOEMULSIFICATION CLEAR CORNEA WITH TORIC  "INTRAOCULAR LENS IMPLANT ; Surgeon:ANDRA LAGUNAS; Location:[facility]     PROSTATE SURGERY  2010     VITRECTOMY PARSPLANA WITH 23 GAUGE SYSTEM  2013    Procedure: VITRECTOMY PARSPLANA WITH 23 GAUGE SYSTEM;  LEFT 23 GAUGE VITRECTOMY, EPIRETINAL MEMBRANE REMOVAL, AIR FLUID EXCHANGE ;  Surgeon: Lawson Celeste MD;  Location: [facility]     VITRECTOMY PARSPLANA WITH 23 GAUGE SYSTEM  2014    Procedure: VITRECTOMY PARSPLANA WITH 23 GAUGE SYSTEM;  RIGHT VITRECTOMY PARSPLANA WITH 23 GAUGE SYSTEM, EPIRETINAL MEMBRANE REMOVAL, AIR/FLUID EXCHANGE ;  Surgeon: Lawson Celeste MD;  Location: [facility]       FAMILY HISTORY:   Family History   Problem Relation Age of Onset     Neurologic Disorder Father       age 70, ALS, laryngeal CA     C.A.D. Father      HEART DISEASE Father      Family History Negative Mother       age 99 after hip fx, was almost 100     HEART DISEASE Sister      Born 1931     Arthritis Sister      Born 1933       SOCIAL HISTORY:   Social History   Substance Use Topics     Smoking status: Former Smoker     Packs/day: 1.00     Years: 10.00     Types: Cigarettes, Pipe     Quit date: 1972     Smokeless tobacco: Never Used     Alcohol use No      Comment: occ wine       Current Outpatient Prescriptions   Medication     triamterene-hydrochlorothiazide (MAXZIDE-25) 37.5-25 MG per tablet     amLODIPine (NORVASC) 5 MG tablet     clotrimazole (LOTRIMIN) 1 % cream     Menthol, Topical Analgesic, 2 % GEL     docusate calcium (SURFAK) 240 MG capsule     naproxen sodium (ALEVE) 220 MG tablet     simvastatin (ZOCOR) 20 MG tablet     InFLIXimab (REMICADE IV)     acetaminophen (TYLENOL) 500 MG tablet     methotrexate 2.5 MG tablet     folic acid (FOLVITE) 1 MG tablet     fexofenadine (ALLEGRA) 180 MG tablet     Multiple Vitamins-Minerals (PRESERVISION AREDS PO)     aspirin 81 MG tablet     No current facility-administered medications for this visit.          PHYSICAL EXAM:    Pulse 52  Ht 1.753 m (5' 9\")  Wt " 102.1 kg (225 lb)  SpO2 98%  BMI 33.23 kg/m2    HEENT: Normocephalic and atraumatic   Cardiac: Not done  Back/Flank: Not done  CNS/PNS: Not done  Respiratory: Normal non-labored breathing  Abdomen: Soft nontender and nondistended  Peripheral Vascular: Not done  Mental Status: Not done    Penis: Not done  Scrotal Skin: Not done  Testicles: Not done  Epididymis: Not done  Digital Rectal Exam:     Cystoscopy: Not done    Imaging: None    Urinalysis: UA RESULTS:  Recent Labs   Lab Test  12/08/16   1530   COLOR  Yellow   APPEARANCE  Clear   URINEGLC  Negative   URINEBILI  Negative   URINEKETONE  Negative   SG  1.020   UBLD  Negative   URINEPH  6.5   PROTEIN  Negative   UROBILINOGEN  1.0   NITRITE  Negative   LEUKEST  Negative   RBCU  O - 2   WBCU  O - 2       PSA: 0.21    Post Void Residual:     Other labs: None today      IMPRESSION:  Prostate cancer, Rising PSA    PLAN:  His PSA has risen over the past 6 months. It does remain low. I recommended that we follow this closely. I will see him back in 6 months for another PSA check.    Total Time: 15 minutes                                      Total in Consultation: 15 minutes      Bhavin Celeste M.D.

## 2017-07-12 NOTE — MR AVS SNAPSHOT
After Visit Summary   7/12/2017    Raul Younger    MRN: [id number]           Patient Information     Date Of Birth          1935        Visit Information        Provider Department      7/12/2017 9:40 AM Bhavin Celeste MD [facility]        Today's Diagnoses     Malignant neoplasm of prostate (H)    -  1       Follow-ups after your visit        Follow-up notes from your care team     Return in about 6 months (around 1/12/2018) for PSA.      Your next 10 appointments already scheduled     Jul 17, 2017  2:30 PM CDT   Return Sleep Patient with Yoan Maria MD   [facility]    [address]  [facility] [id number] [phone]            Sourav 15, 2018  1:00 PM CST   Return Prostate Cancer with Bhavin Celeste MD   [facility] ([facility])    [address]  [address]  [facility] [address] [phone]              Future tests that were ordered for you today     Open Future Orders        Priority Expected Expires Ordered    PSA tumor marker Routine  7/12/2018 7/12/2017            Who to contact     If you have questions or need follow up information about today's clinic visit or your schedule please contact [facility] directly at [phone].  Normal or non-critical lab and imaging results will be communicated to you by MyChart, letter or phone within 4 business days after the clinic has received the results. If you do not hear from us within 7 days, please contact the clinic through MyChart or phone. If you have a critical or abnormal lab result, we will notify you by phone as soon as possible.  Submit refill requests through [employer] or call your pharmacy and they will forward the refill request to us. Please allow 3 business days for your refill  "to be completed.          Additional Information About Your Visit        MyChart Information     [employer] gives you secure access to your electronic health record. If you see a primary care provider, you can also send messages to your care team and make appointments. If you have questions, please call your primary care clinic.  If you do not have a primary care provider, please call [phone] and they will assist you.        Care EveryWhere ID     This is your Care EveryWhere ID. This could be used by other organizations to access your [place] medical records  [id number]        Your Vitals Were     Pulse Height Pulse Oximetry BMI (Body Mass Index)          52 1.753 m (5' 9\") 98% 33.23 kg/m2         Blood Pressure from Last 3 Encounters:   04/25/17 124/70   03/22/17 128/68   01/11/17 120/78    Weight from Last 3 Encounters:   07/12/17 102.1 kg (225 lb)   04/25/17 103 kg (227 lb 1.6 oz)   03/22/17 105.2 kg (232 lb)               Primary Care Provider Office Phone # Fax #    Jose D Vasquez [phone]       [facility] [address]  [facility] [address]        Equal Access to Services     DAO TELLEZ : Hadii aad ku hadasho Soomaali, waaxda luqadaha, qaybta kaalmada adeegyada, waxay idiin haymariettan danielle lopez laluis robertson. So [facility] [phone].    ATENCIÓN: Si habla español, tiene a bautista disposición servicios gratuitos de asistencia lingüística. Llame al [phone].    We comply with applicable federal civil rights laws and [place] laws. We do not discriminate on the basis of race, color, national origin, age, disability sex, sexual orientation or gender identity.            Thank you!     Thank you for choosing [facility] [place]  for your care. Our goal is always to provide you with excellent care. Hearing back from our patients is one way we can continue to improve our services. Please take a few minutes to complete the written survey " that you may receive in the mail after your visit with us. Thank you!             Your Updated Medication List - Protect others around you: Learn how to safely use, store and throw away your medicines at www.disposemymeds.org.          This list is accurate as of: 7/12/17  9:53 AM.  Always use your most recent med list.                   Brand Name Dispense Instructions for use Diagnosis    acetaminophen 500 MG tablet    TYLENOL     Take 1,000 mg by mouth every 6 hours as needed for pain        ALEVE 220 MG tablet   Generic drug:  naproxen sodium      Take by mouth as needed for moderate pain Reported on 3/22/2017        amLODIPine 5 MG tablet    NORVASC    180 tablet    Take 0.5 tablets (2.5 mg) by mouth daily    Essential hypertension, benign       aspirin 81 MG tablet      Take 1 tablet by mouth daily.        clotrimazole 1 % cream    LOTRIMIN    30 g    Apply topically 2 times daily    Tinea cruris       docusate calcium 240 MG capsule    SURFAK          fexofenadine 180 MG tablet    ALLEGRA     Take 180 mg by mouth daily Reported on 3/22/2017        folic acid 1 MG tablet    FOLVITE     Take 1 mg by mouth daily        Menthol (Topical Analgesic) 2 % Gel           methotrexate 2.5 MG tablet CHEMO      Take 25 mg by mouth once a week On Tuesdays        PRESERVISION AREDS PO      Take 1 tablet by mouth every morning        REMICADE IV      Infusion every 6 weeks        simvastatin 20 MG tablet    ZOCOR    90 tablet    Take 1 tablet (20 mg) by mouth At Bedtime    Mixed hyperlipidemia       triamterene-hydrochlorothiazide 37.5-25 MG per tablet    MAXZIDE-25    180 tablet    Take 2 tablets by mouth daily    Essential hypertension, benign, Edema, unspecified type

## 2017-07-12 NOTE — LETTER
7/12/2017       RE: Raul AGRAWAL Carmelolillianmichael  [address] [address]  [address]  [facility] [address]     Dear Colleague,    Thank you for referring your patient, Raul Youngre, to the [facility] [place] [facility]. Please see a copy of my visit note below.    Office Visit Note  [facility]  [phone]    UROLOGIC DIAGNOSES:   [place] 4+4 = 8 prostate cancer    CURRENT INTERVENTIONS:   S/P prostate cryoablation, on Flomax and finasteride    HISTORY:   Raul returns to clinic today for prostate cancer follow-up. His PSA has risen somewhat up to 0.21. He continues to have no urinary complaints.      PAST MEDICAL HISTORY:   Past Medical History:   Diagnosis Date     Arthritis      HTN      Malignant neoplasm (H) 01/2011    Prostate cancer     MI, old     vs. asthma per pt.'s wife     Migraine, unspecified, without mention of intractable migraine without mention of status migrainosus     Abstracted 5/15/02.     Other and unspecified hyperlipidemia     Abstracted 5/15/02.     Other and unspecified hyperlipidemia      Sleep apnea        PAST SURGICAL HISTORY:   Past Surgical History:   Procedure Laterality Date     C NONSPECIFIC PROCEDURE  1996    Paraesophageal Hernia Repair. Abstracted 5/15/02.     C NONSPECIFIC PROCEDURE  1943    Tonsillectomy. Abstracted 5/15/02.     COLONOSCOPY  12/29/2011    Diverticuli, 3 mm tissue biopsied, path showed no hyperplastic or adenomatous elements     CYSTOSCOPY       ENT SURGERY       GENITOURINARY SURGERY       HEAD & NECK SURGERY       IRRIGATION AND DEBRIDEMENT HIP, COMBINED Left 11/7/2015    Procedure: COMBINED IRRIGATION AND DEBRIDEMENT HIP;  Surgeon: Stanislav Maharaj MD;  Location:  OR     ORTHOPEDIC SURGERY       PHACOEMULSIFICATION CLEAR CORNEA WITH TORIC INTRAOCULAR LENS IMPLANT  5/23/2012    Procedure:PHACOEMULSIFICATION CLEAR CORNEA WITH TORIC INTRAOCULAR LENS IMPLANT; RIGHT  PHACOEMULSIFICATION CLEAR CORNEA WITH  DELUXE TORIC INTRAOCULAR LENS IMPLANT; Surgeon:ANDRA LAGUNAS; Location:[facility]     PHACOEMULSIFICATION CLEAR CORNEA WITH TORIC INTRAOCULAR LENS IMPLANT  2012    Procedure:PHACOEMULSIFICATION CLEAR CORNEA WITH TORIC INTRAOCULAR LENS IMPLANT; LEFT PHACOEMULSIFICATION CLEAR CORNEA WITH TORIC INTRAOCULAR LENS IMPLANT ; Surgeon:ANDRA LAGUNAS; Location:[facility]     PROSTATE SURGERY  2010     VITRECTOMY PARSPLANA WITH 23 GAUGE SYSTEM  2013    Procedure: VITRECTOMY PARSPLANA WITH 23 GAUGE SYSTEM;  LEFT 23 GAUGE VITRECTOMY, EPIRETINAL MEMBRANE REMOVAL, AIR FLUID EXCHANGE ;  Surgeon: Lawson Celeste MD;  Location: [facility]     VITRECTOMY PARSPLANA WITH 23 GAUGE SYSTEM  2014    Procedure: VITRECTOMY PARSPLANA WITH 23 GAUGE SYSTEM;  RIGHT VITRECTOMY PARSPLANA WITH 23 GAUGE SYSTEM, EPIRETINAL MEMBRANE REMOVAL, AIR/FLUID EXCHANGE ;  Surgeon: Lawson Celeste MD;  Location: [facility]       FAMILY HISTORY:   Family History   Problem Relation Age of Onset     Neurologic Disorder Father       age 70, ALS, laryngeal CA     C.A.D. Father      HEART DISEASE Father      Family History Negative Mother       age 99 after hip fx, was almost 100     HEART DISEASE Sister      Born 1931     Arthritis Sister      Born 1933       SOCIAL HISTORY:   Social History   Substance Use Topics     Smoking status: Former Smoker     Packs/day: 1.00     Years: 10.00     Types: Cigarettes, Pipe     Quit date: 1972     Smokeless tobacco: Never Used     Alcohol use No      Comment: occ wine       Current Outpatient Prescriptions   Medication     triamterene-hydrochlorothiazide (MAXZIDE-25) 37.5-25 MG per tablet     amLODIPine (NORVASC) 5 MG tablet     clotrimazole (LOTRIMIN) 1 % cream     Menthol, Topical Analgesic, 2 % GEL     docusate calcium (SURFAK) 240 MG capsule     naproxen sodium (ALEVE) 220 MG tablet     simvastatin (ZOCOR) 20 MG tablet     InFLIXimab (REMICADE IV)     acetaminophen  "(TYLENOL) 500 MG tablet     methotrexate 2.5 MG tablet     folic acid (FOLVITE) 1 MG tablet     fexofenadine (ALLEGRA) 180 MG tablet     Multiple Vitamins-Minerals (PRESERVISION AREDS PO)     aspirin 81 MG tablet     No current facility-administered medications for this visit.          PHYSICAL EXAM:    Pulse 52  Ht 1.753 m (5' 9\")  Wt 102.1 kg (225 lb)  SpO2 98%  BMI 33.23 kg/m2    HEENT: Normocephalic and atraumatic   Cardiac: Not done  Back/Flank: Not done  CNS/PNS: Not done  Respiratory: Normal non-labored breathing  Abdomen: Soft nontender and nondistended  Peripheral Vascular: Not done  Mental Status: Not done    Penis: Not done  Scrotal Skin: Not done  Testicles: Not done  Epididymis: Not done  Digital Rectal Exam:     Cystoscopy: Not done    Imaging: None    Urinalysis: UA RESULTS:  Recent Labs   Lab Test  12/08/16   1530   COLOR  Yellow   APPEARANCE  Clear   URINEGLC  Negative   URINEBILI  Negative   URINEKETONE  Negative   SG  1.020   UBLD  Negative   URINEPH  6.5   PROTEIN  Negative   UROBILINOGEN  1.0   NITRITE  Negative   LEUKEST  Negative   RBCU  O - 2   WBCU  O - 2       PSA: 0.21    Post Void Residual:     Other labs: None today      IMPRESSION:  Prostate cancer, Rising PSA    PLAN:  His PSA has risen over the past 6 months. It does remain low. I recommended that we follow this closely. I will see him back in 6 months for another PSA check.    Total Time: 15 minutes                                      Total in Consultation: 15 minutes      Bhavin Celeste M.D.              Again, thank you for allowing me to participate in the care of your patient.      Sincerely,    Bhavin Celeste MD      "

## 2017-07-17 ENCOUNTER — OFFICE VISIT (OUTPATIENT)
Dept: SLEEP MEDICINE | Facility: CLINIC | Age: 82
End: 2017-07-17
Payer: COMMERCIAL

## 2017-07-17 VITALS
HEART RATE: 69 BPM | OXYGEN SATURATION: 96 % | WEIGHT: 223 LBS | DIASTOLIC BLOOD PRESSURE: 73 MMHG | TEMPERATURE: 98.4 F | HEIGHT: 69 IN | BODY MASS INDEX: 33.03 KG/M2 | SYSTOLIC BLOOD PRESSURE: 113 MMHG

## 2017-07-17 DIAGNOSIS — G47.33 OSA (OBSTRUCTIVE SLEEP APNEA): Primary | ICD-10-CM

## 2017-07-17 DIAGNOSIS — I10 ESSENTIAL HYPERTENSION: ICD-10-CM

## 2017-07-17 PROCEDURE — 99213 OFFICE O/P EST LOW 20 MIN: CPT | Performed by: INTERNAL MEDICINE

## 2017-07-17 NOTE — MR AVS SNAPSHOT
After Visit Summary   7/17/2017    Raul Younger    MRN: [id number]           Patient Information     Date Of Birth          1935        Visit Information        Provider Department      7/17/2017 2:30 PM Yoan Maria MD [facility]        Today's Diagnoses     MIGUEL (obstructive sleep apnea)    -  1    Essential hypertension          Care Instructions      Your BMI is Body mass index is 32.92 kg/(m^2).  Weight management is a personal decision.  If you are interested in exploring weight loss strategies, the following discussion covers the approaches that may be successful. Body mass index (BMI) is one way to tell whether you are at a healthy weight, overweight, or obese. It measures your weight in relation to your height.  A BMI of 18.5 to 24.9 is in the healthy range. A person with a BMI of 25 to 29.9 is considered overweight, and someone with a BMI of 30 or greater is considered obese. More than two-thirds of American adults are considered overweight or obese.  Being overweight or obese increases the risk for further weight gain. Excess weight may lead to heart disease and diabetes.  Creating and following plans for healthy eating and physical activity may help you improve your health.  Weight control is part of healthy lifestyle and includes exercise, emotional health, and healthy eating habits. Careful eating habits lifelong are the mainstay of weight control. Though there are significant health benefits from weight loss, long-term weight loss with diet alone may be very difficult to achieve- studies show long-term success with dietary management in less than 10% of people. Attaining a healthy weight may be especially difficult to achieve in those with severe obesity. In some cases, medications, devices and surgical management might be considered.  What can you do?  If you are overweight or obese and are interested in methods for weight loss, you should discuss this with  your provider.     Consider reducing daily calorie intake by 500 calories.     Keep a food journal.     Avoiding skipping meals, consider cutting portions instead.    Diet combined with exercise helps maintain muscle while optimizing fat loss. Strength training is particularly important for building and maintaining muscle mass. Exercise helps reduce stress, increase energy, and improves fitness. Increasing exercise without diet control, however, may not burn enough calories to loose weight.       Start walking three days a week 10-20 minutes at a time    Work towards walking thirty minutes five days a week     Eventually, increase the speed of your walking for 1-2 minutes at time    In addition, we recommend that you review healthy lifestyles and methods for weight loss available through the National Institutes of Health patient information sites:  http://win.niddk.nih.gov/publications/index.htm    And look into health and wellness programs that may be available through your health insurance provider, employer, local community center, or li club.    Weight management plan: Patient was referred to their PCP to discuss a diet and exercise plan.              Follow-ups after your visit        Your next 10 appointments already scheduled     Sourav 15, 2018  1:00 PM CST   Return Prostate Cancer with Bhavin Celeste MD   [facility] ([facility])    [address]  [facility] [id number] [phone]            Jul 16, 2018 11:30 AM CDT   Return Sleep Patient with Yoan Maria MD   [facility] ([facility])    [address]  [facility] [id number] [phone]              Who to contact     If you have questions or need follow up information about today's clinic visit or your schedule please contact [facility] directly at [phone].  Normal or non-critical  "lab and imaging results will be communicated to you by MyChart, letter or phone within 4 business days after the clinic has received the results. If you do not hear from us within 7 days, please contact the clinic through [employer] or phone. If you have a critical or abnormal lab result, we will notify you by phone as soon as possible.  Submit refill requests through [employer] or call your pharmacy and they will forward the refill request to us. Please allow 3 business days for your refill to be completed.          Additional Information About Your Visit        [employer] Information     [employer] gives you secure access to your electronic health record. If you see a primary care provider, you can also send messages to your care team and make appointments. If you have questions, please call your primary care clinic.  If you do not have a primary care provider, please call [phone] and they will assist you.        Care EveryWhere ID     This is your Care EveryWhere ID. This could be used by other organizations to access your [place] medical records  [id number]        Your Vitals Were     Pulse Temperature Height Pulse Oximetry BMI (Body Mass Index)       69 98.4  F (36.9  C) (Oral) 1.753 m (5' 9.02\") 96% 32.92 kg/m2        Blood Pressure from Last 3 Encounters:   07/17/17 113/73   04/25/17 124/70   03/22/17 128/68    Weight from Last 3 Encounters:   07/17/17 101.2 kg (223 lb)   07/12/17 102.1 kg (225 lb)   04/25/17 103 kg (227 lb 1.6 oz)              We Performed the Following     Comprehensive DME        Primary Care Provider Office Phone # Fax #    Jose D Vasquez [phone]       [facility] [address]  [facility] [address]        Equal Access to Services     DAO TELLEZ : Roopa Long, seth salcedo, fermín ansari, wei robertson. So [facility] [phone].    ATENCIÓN: Si habla español, tiene a bautista disposición servicios " gilma de asistencia lingüística. Robbie arteaga [phone].    We comply with applicable federal civil rights laws and [place] laws. We do not discriminate on the basis of race, color, national origin, age, disability sex, sexual orientation or gender identity.            Thank you!     Thank you for choosing [facility]  for your care. Our goal is always to provide you with excellent care. Hearing back from our patients is one way we can continue to improve our services. Please take a few minutes to complete the written survey that you may receive in the mail after your visit with us. Thank you!             Your Updated Medication List - Protect others around you: Learn how to safely use, store and throw away your medicines at www.disposemymeds.org.          This list is accurate as of: 7/17/17  2:44 PM.  Always use your most recent med list.                   Brand Name Dispense Instructions for use Diagnosis    acetaminophen 500 MG tablet    TYLENOL     Take 1,000 mg by mouth every 6 hours as needed for pain        ALEVE 220 MG tablet   Generic drug:  naproxen sodium      Take by mouth as needed for moderate pain Reported on 3/22/2017        amLODIPine 5 MG tablet    NORVASC    180 tablet    Take 0.5 tablets (2.5 mg) by mouth daily    Essential hypertension, benign       aspirin 81 MG tablet      Take 1 tablet by mouth daily.        clotrimazole 1 % cream    LOTRIMIN    30 g    Apply topically 2 times daily    Tinea cruris       docusate calcium 240 MG capsule    SURFAK          fexofenadine 180 MG tablet    ALLEGRA     Take 180 mg by mouth daily Reported on 3/22/2017        folic acid 1 MG tablet    FOLVITE     Take 1 mg by mouth daily        Menthol (Topical Analgesic) 2 % Gel           methotrexate 2.5 MG tablet CHEMO      Take 25 mg by mouth once a week On Tuesdays        PRESERVISION AREDS PO      Take 1 tablet by mouth every morning        REMICADE IV      Infusion every 6 weeks         simvastatin 20 MG tablet    ZOCOR    90 tablet    Take 1 tablet (20 mg) by mouth At Bedtime    Mixed hyperlipidemia       triamterene-hydrochlorothiazide 37.5-25 MG per tablet    MAXZIDE-25    180 tablet    Take 2 tablets by mouth daily    Essential hypertension, benign, Edema, unspecified type

## 2017-07-17 NOTE — NURSING NOTE
"Chief Complaint   Patient presents with     CPAP Follow Up     Follow up roberto       Initial /73  Pulse 69  Temp 98.4  F (36.9  C) (Oral)  Ht 1.753 m (5' 9.02\")  Wt 101.2 kg (223 lb)  SpO2 96%  BMI 32.92 kg/m2 Estimated body mass index is 32.92 kg/(m^2) as calculated from the following:    Height as of this encounter: 1.753 m (5' 9.02\").    Weight as of this encounter: 101.2 kg (223 lb).  Medication Reconciliation: complete   ESS 9/24  Adrianna Penaloza MA      "

## 2017-07-17 NOTE — PROGRESS NOTES
"  Obstructive Sleep Apnea - PAP Follow-Up Visit:    Chief Complaint   Patient presents with     CPAP Follow Up     Follow up roberto       Raul Younger comes in today for follow-up of their mild to moderate sleep apnea, managed with CPAP. His sleep study from 2007 showed an apnea-hypopnea index of 11.4 per hour and RDI of 25.5 per hour.     Overall, he rates the experience with PAP as 9 (0 poor, 10 great). The mask is comfortable. The mask is leaking, 3 nights per week.  He is not snoring with the mask on. He is not having gasp arousals.  He is not having significant oral/nasal dryness. The pressure settings are comfortable.     He uses nasal pillows.     Bedtime is typically 10:30 pm . Usually it takes about 20 minutes to fall asleep with the mask on. Wake time is typically 4:30 am to 6 am.  Patient is using PAP therapy 7 hours per night. The patient is usually getting 7 hours of sleep per night.    He does feel rested in the morning.    Total score - [place]: 9 (7/17/2017  2:00 PM)      CPAP 12 cmH2O download:  30/30 total days of use. 0 nonuse days. 100% days with >4 hours use.  Average use 7 hours 1 minutes per day.  AHI 1.2.       Reviewed by team: Allergies  Meds       Reviewed by provider:        Problem List:  Patient Active Problem List    Diagnosis Date Noted     RA (rheumatoid arthritis) (H) 02/19/2015 2014. Treat with Pulse doses of Prednisone by Rheumatologist for \"exacerbations/flare-ups\"       History of colonic polyps 02/19/2015     Diagnosed 12/2011 due to for colonoscopy 2016  SPECIMEN(S):  Colon polyp ascending    FINAL DIAGNOSIS:  Ascending colon polyp, biopsy - Small fragment of mucosa without  diagnostic evidence of hyperplastic or adenomatous polyp. Negative for  dysplasia and malignancy.  Problem list name updated by automated process. Provider to review       Advanced directives, counseling/discussion 12/08/2011     Pt already has a HCD and will bring in a copy.     Shauna Mosquera " "MA         HYPERLIPIDEMIA LDL GOAL <130 10/31/2010     Prostate cancer (H) 01/26/2010     Cryosurgery  January 2011       Chronic rhinitis 06/02/2008     (Problem list name updated by automated process. Provider to review and confirm.)       Essential hypertension, benign 02/06/2003          /73  Pulse 69  Temp 98.4  F (36.9  C) (Oral)  Ht 1.753 m (5' 9.02\")  Wt 101.2 kg (223 lb)  SpO2 96%  BMI 32.92 kg/m2    Impression/Plan:     Mild sleep apnea.     Tolerating PAP well. Daytime symptoms are improved..     Plan:     1. Continue CPAP therapy at a pressure of 12 cm H2O    Raul Younger will follow up in about 1 year(s).     Fifteen minutes spent with patient, all of which were spent face-to-face counseling, consulting, coordinating plan of care.      Yoan Maria MD, MD    CC:  Jose D Vasquez,   "

## 2017-07-17 NOTE — PATIENT INSTRUCTIONS

## 2017-09-13 ENCOUNTER — TELEPHONE (OUTPATIENT)
Dept: INTERNAL MEDICINE | Facility: CLINIC | Age: 82
End: 2017-09-13

## 2017-09-13 DIAGNOSIS — K42.9 UMBILICAL HERNIA WITHOUT OBSTRUCTION AND WITHOUT GANGRENE: Primary | ICD-10-CM

## 2017-09-13 NOTE — TELEPHONE ENCOUNTER
[facility] [place] [phone]       Pt informed of referral info/phone number.

## 2017-09-13 NOTE — TELEPHONE ENCOUNTER
Pt calling.  Has been diagnosed with a navel hernia x years ago.  States has been acting up the last week--very sensitive.  Feels pain with coughing/sneezing.      Asking if he needs to come in for OV or refer to surgeon.    Last OV 4-25-17    Please advise, thanks.

## 2017-09-16 ENCOUNTER — NURSE TRIAGE (OUTPATIENT)
Dept: NURSING | Facility: CLINIC | Age: 82
End: 2017-09-16

## 2017-09-16 ENCOUNTER — APPOINTMENT (OUTPATIENT)
Dept: CT IMAGING | Facility: CLINIC | Age: 82
End: 2017-09-16
Attending: EMERGENCY MEDICINE
Payer: COMMERCIAL

## 2017-09-16 ENCOUNTER — HOSPITAL ENCOUNTER (EMERGENCY)
Facility: CLINIC | Age: 82
Discharge: HOME | End: 2017-09-16
Attending: EMERGENCY MEDICINE | Admitting: EMERGENCY MEDICINE
Payer: COMMERCIAL

## 2017-09-16 VITALS
BODY MASS INDEX: 32.88 KG/M2 | HEART RATE: 60 BPM | SYSTOLIC BLOOD PRESSURE: 139 MMHG | HEIGHT: 69 IN | RESPIRATION RATE: 16 BRPM | DIASTOLIC BLOOD PRESSURE: 75 MMHG | WEIGHT: 222 LBS | OXYGEN SATURATION: 96 % | TEMPERATURE: 98.9 F

## 2017-09-16 DIAGNOSIS — R10.33 PERIUMBILICAL ABDOMINAL PAIN: ICD-10-CM

## 2017-09-16 DIAGNOSIS — K42.9 PARAUMBILICAL HERNIA: ICD-10-CM

## 2017-09-16 LAB
ALBUMIN UR-MCNC: NEGATIVE MG/DL
ANION GAP SERPL CALCULATED.3IONS-SCNC: 8 MMOL/L (ref 3–14)
APPEARANCE UR: CLEAR
BASOPHILS # BLD AUTO: 0 10E9/L (ref 0–0.2)
BASOPHILS NFR BLD AUTO: 0.3 %
BILIRUB UR QL STRIP: NEGATIVE
BUN SERPL-MCNC: 15 MG/DL (ref 7–30)
CALCIUM SERPL-MCNC: 8.9 MG/DL (ref 8.5–10.1)
CHLORIDE SERPL-SCNC: 102 MMOL/L (ref 94–109)
CO2 SERPL-SCNC: 26 MMOL/L (ref 20–32)
COLOR UR AUTO: NORMAL
CREAT SERPL-MCNC: 0.88 MG/DL (ref 0.66–1.25)
DIFFERENTIAL METHOD BLD: ABNORMAL
EOSINOPHIL # BLD AUTO: 0.1 10E9/L (ref 0–0.7)
EOSINOPHIL NFR BLD AUTO: 1.1 %
ERYTHROCYTE [DISTWIDTH] IN BLOOD BY AUTOMATED COUNT: 14.4 % (ref 10–15)
GFR SERPL CREATININE-BSD FRML MDRD: 84 ML/MIN/1.7M2
GLUCOSE SERPL-MCNC: 100 MG/DL (ref 70–99)
GLUCOSE UR STRIP-MCNC: NEGATIVE MG/DL
HCT VFR BLD AUTO: 37.5 % (ref 40–53)
HGB BLD-MCNC: 12.3 G/DL (ref 13.3–17.7)
HGB UR QL STRIP: NEGATIVE
IMM GRANULOCYTES # BLD: 0 10E9/L (ref 0–0.4)
IMM GRANULOCYTES NFR BLD: 0.1 %
KETONES UR STRIP-MCNC: NEGATIVE MG/DL
LACTATE BLD-SCNC: 1.3 MMOL/L (ref 0.7–2)
LEUKOCYTE ESTERASE UR QL STRIP: NEGATIVE
LYMPHOCYTES # BLD AUTO: 2.3 10E9/L (ref 0.8–5.3)
LYMPHOCYTES NFR BLD AUTO: 26.5 %
MCH RBC QN AUTO: 30.2 PG (ref 26.5–33)
MCHC RBC AUTO-ENTMCNC: 32.8 G/DL (ref 31.5–36.5)
MCV RBC AUTO: 92 FL (ref 78–100)
MONOCYTES # BLD AUTO: 0.8 10E9/L (ref 0–1.3)
MONOCYTES NFR BLD AUTO: 8.9 %
NEUTROPHILS # BLD AUTO: 5.5 10E9/L (ref 1.6–8.3)
NEUTROPHILS NFR BLD AUTO: 63.1 %
NITRATE UR QL: NEGATIVE
NRBC # BLD AUTO: 0 10*3/UL
NRBC BLD AUTO-RTO: 0 /100
PH UR STRIP: 6 PH (ref 5–7)
PLATELET # BLD AUTO: 243 10E9/L (ref 150–450)
POTASSIUM SERPL-SCNC: 3.5 MMOL/L (ref 3.4–5.3)
RBC # BLD AUTO: 4.07 10E12/L (ref 4.4–5.9)
RBC #/AREA URNS AUTO: 1 /HPF (ref 0–2)
SODIUM SERPL-SCNC: 136 MMOL/L (ref 133–144)
SOURCE: NORMAL
SP GR UR STRIP: 1.01 (ref 1–1.03)
SQUAMOUS #/AREA URNS AUTO: <1 /HPF (ref 0–1)
UROBILINOGEN UR STRIP-MCNC: 0 MG/DL (ref 0–2)
WBC # BLD AUTO: 8.7 10E9/L (ref 4–11)
WBC #/AREA URNS AUTO: <1 /HPF (ref 0–2)

## 2017-09-16 PROCEDURE — 83605 ASSAY OF LACTIC ACID: CPT | Performed by: EMERGENCY MEDICINE

## 2017-09-16 PROCEDURE — 25000128 H RX IP 250 OP 636: Performed by: EMERGENCY MEDICINE

## 2017-09-16 PROCEDURE — 81001 URINALYSIS AUTO W/SCOPE: CPT | Performed by: EMERGENCY MEDICINE

## 2017-09-16 PROCEDURE — 80048 BASIC METABOLIC PNL TOTAL CA: CPT | Performed by: EMERGENCY MEDICINE

## 2017-09-16 PROCEDURE — 74177 CT ABD & PELVIS W/CONTRAST: CPT

## 2017-09-16 PROCEDURE — 85025 COMPLETE CBC W/AUTO DIFF WBC: CPT | Performed by: EMERGENCY MEDICINE

## 2017-09-16 PROCEDURE — 99285 EMERGENCY DEPT VISIT HI MDM: CPT | Mod: 25

## 2017-09-16 RX ORDER — OXYCODONE HYDROCHLORIDE 5 MG/1
5 TABLET ORAL EVERY 6 HOURS PRN
Qty: 12 TABLET | Refills: 0 | Status: SHIPPED | OUTPATIENT
Start: 2017-09-16 | End: 2018-06-08

## 2017-09-16 RX ORDER — IOPAMIDOL 755 MG/ML
500 INJECTION, SOLUTION INTRAVASCULAR ONCE
Status: COMPLETED | OUTPATIENT
Start: 2017-09-16 | End: 2017-09-16

## 2017-09-16 RX ADMIN — SODIUM CHLORIDE 65 ML: 9 INJECTION, SOLUTION INTRAVENOUS at 21:16

## 2017-09-16 RX ADMIN — IOPAMIDOL 100 ML: 755 INJECTION, SOLUTION INTRAVENOUS at 21:16

## 2017-09-16 ASSESSMENT — ENCOUNTER SYMPTOMS
FEVER: 0
DYSURIA: 0
CONSTIPATION: 0
VOMITING: 0
DIARRHEA: 0
HEMATURIA: 0
FREQUENCY: 0
ABDOMINAL PAIN: 1
NAUSEA: 0

## 2017-09-16 NOTE — ED AVS SNAPSHOT
[facility]    [address]    [facility] [address]    Phone:  [phone]    Fax:  [phone]                                       Raul Younger   MRN: [id number]    Department:  [facility] Emergency Department   Date of Visit:  9/16/2017           After Visit Summary Signature Page     I have received my discharge instructions, and my questions have been answered. I have discussed any challenges I see with this plan with the nurse or doctor.    ..........................................................................................................................................  Patient/Patient Representative Signature      ..........................................................................................................................................  Patient Representative Print Name and Relationship to Patient    ..................................................               ................................................  Date                                            Time    ..........................................................................................................................................  Reviewed by Signature/Title    ...................................................              ..............................................  Date                                                            Time

## 2017-09-16 NOTE — ED AVS SNAPSHOT
[facility] Emergency Department    [address]    [facility] [address]    Phone:  [phone]    Fax:  [phone]                                       Raul Younger   MRN: [id number]    Department:  [facility] Emergency Department   Date of Visit:  9/16/2017           Patient Information     Date Of Birth          1935        Your diagnoses for this visit were:     Paraumbilical hernia     Periumbilical abdominal pain        You were seen by Masha Neves MD.      Follow-up Information     Schedule an appointment as soon as possible for a visit with [facility].    Contact information:    [address]  [facility] [id number] [phone]          Discharge Instructions       Please follow up closely with the surgeon. Please return to the ED if your symptoms worsen or if you develop new or concerning symptoms.     Discharge Instructions  Abdominal Pain    Abdominal pain can be caused by many things. Your evaluation today does not show the exact cause for your pain. Your doctor today has decided that it is unlikely your pain is due to a life threatening problem, or a problem requiring surgery or hospital admission. Sometimes those problems cannot be found right away, so it is very important that you follow up as directed.  Sometimes only the changes which occur over time allow the cause of your pain to be found.    Return to the Emergency Department for a recheck in 8-12 hours if your pain continues.  If your pain gets worse, changes in location, or feels different, return to the Emergency Department right away.    ADULTS:  Return to the Emergency Department right away if:      You get an oral temperature above 102oF or as directed by your doctor.    You have blood in your stools (bright red or black, tarry stools).    You keep throwing up or can t drink liquids.    You see blood when you throw up.    You can t have a bowel  movement or you can t pass gas.    Your stomach gets bloated or bigger.    Your skin or the whites of your eyes look yellow.    You faint.    You have bloody, frequent or painful urination.    You have new symptoms or anything that worries you.    CHILDREN:  Return to the Emergency Department right away if your child has any of the above-listed symptoms or the following:      Pushes your hand away or screams/cries when his/her belly is touched.    You notice your child is very fussy or weak.    Your child is very tired and is too tired to eat or drink.    Your child is dehydrated.  Signs of dehydration can be:  o Your infant has had no wet diapers in 4-5 hours.  o Your older child has not passed urine in 6-8 hours.  o Your infant or child starts to have dry mouth and lips, or no saliva or tears.    PREGNANT WOMEN:  Return to the Emergency Department right away if you have any of the above-listed symptoms or the following:      You have bleeding, leaking fluid or passing tissue from the vagina.    You have worse pain or cramping, or pain in your shoulder or back.    You have vomiting that will not stop.    You have painful or bloody urination.    You have a temperature of 100oF or more.    Your baby is not moving as much as usual.    You faint.    You get a bad headache with or without eye problems and abdominal pain.    You have a convulsion or seizure.    You have unusual discharge from your vagina and abdominal pain.    Abdominal pain is pretty common during pregnancy.  Your pain may or may not be related to your pregnancy. You should follow-up closely with your OB doctor so they can evaluate you and your baby.  Until you follow-up with your regular doctor, do the following:       Avoid sex and do not put anything in your vagina.    Drink clear fluids.    Only take medications approved by your doctor.    MORE INFORMATION:    Appendicitis:  A possible cause of abdominal pain in any person who still has their  "appendix is acute appendicitis. Appendicitis is often hard to diagnose.  Testing does not always rule out early appendicitis or other causes of abdominal pain. Close follow-up with your doctor and re-evaluations may be needed to figure out the reason for your abdominal pain.    Follow-up:  It is very important that you make an appointment with your clinic and go to the appointment.  If you do not follow-up with your primary doctor, it may result in missing an important development which could result in permanent injury or disability and/or lasting pain.  If there is any problem keeping your appointment, call your doctor or return to the Emergency Department.    Medications:  Take your medications as directed by your doctor today.  Before using over-the-counter medications, ask your doctor and make sure to take the medications as directed.  If you have any questions about medications, ask your doctor.    Diet:  Resume your normal diet as much as possible, but do not eat fried, fatty or spicy foods while you have pain.  Do not drink alcohol or have caffeine.  Do not smoke tobacco.    Probiotics: If you have been given an antibiotic, you may want to also take a probiotic pill or eat yogurt with live cultures. Probiotics have \"good bacteria\" to help your intestines stay healthy. Studies have shown that probiotics help prevent diarrhea and other intestine problems (including C. diff infection) when you take antibiotics. You can buy these without a prescription in the pharmacy section of the store.     If you were given a prescription for medicine here today, be sure to read all of the information (including the package insert) that comes with your prescription.  This will include important information about the medicine, its side effects, and any warnings that you need to know about.  The pharmacist who fills the prescription can provide more information and answer questions you may have about the medicine.  If you have " questions or concerns that the pharmacist cannot address, please call or return to the Emergency Department.         Opioid Medication Information    Pain medications are among the most commonly prescribed medicines, so we are including this information for all our patients. If you did not receive pain medication or get a prescription for pain medicine, you can ignore it.     You may have been given a prescription for an opioid (narcotic) pain medicine and/or have received a pain medicine while here in the Emergency Department. These medicines can make you drowsy or impaired. You must not drive, operate dangerous equipment, or engage in any other dangerous activities while taking these medications. If you drive while taking these medications, you could be arrested for DUI, or driving under the influence. Do not drink any alcohol while you are taking these medications.     Opioid pain medications can cause addiction. If you have a history of chemical dependency of any type, you are at a higher risk of becoming addicted to pain medications.  Only take these prescribed medications to treat your pain when all other options have been tried. Take it for as short a time and as few doses as possible. Store your pain pills in a secure place, as they are frequently stolen and provide a dangerous opportunity for children or visitors in your house to start abusing these powerful medications. We will not replace any lost or stolen medicine.  As soon as your pain is better, you should flush all your remaining medication.     Many prescription pain medications contain Tylenol  (acetaminophen), including Vicodin , Tylenol #3 , Norco , Lortab , and Percocet .  You should not take any extra pills of Tylenol  if you are using these prescription medications or you can get very sick.  Do not ever take more than 3000 mg of acetaminophen in any 24 hour period.    All opioids tend to cause constipation. Drink plenty of water and eat foods  that have a lot of fiber, such as fruits, vegetables, prune juice, apple juice and high fiber cereal.  Take a laxative if you don t move your bowels at least every other day. Miralax , Milk of Magnesia, Colace , or Senna  can be used to keep you regular.      Remember that you can always come back to the Emergency Department if you are not able to see your regular doctor in the amount of time listed above, if you get any new symptoms, or if there is anything that worries you.          Future Appointments        Provider Department Dept Phone Center    9/25/2017 2:00 PM Cristina Robbins MD [facility] [phone] SXR    1/15/2018 1:00 PM Bhavin Celeste MD [facility] [place] [phone] [facility]    7/16/2018 11:30 AM Yoan Maria MD, MD [facility] [phone] [facility]      24 Hour Appointment Hotline       To make an appointment at any [facility] [place], call [phone] ([phone]). If you don't have a family doctor or clinic, we will help you find one. [place] clinics are conveniently located to serve the needs of you and your family.             Review of your medicines      START taking        Dose / Directions Last dose taken    oxyCODONE 5 MG IR tablet   Commonly known as:  ROXICODONE   Dose:  5 mg   Quantity:  12 tablet        Take 1 tablet (5 mg) by mouth every 6 hours as needed for pain   Refills:  0          Our records show that you are taking the medicines listed below. If these are incorrect, please call your family doctor or clinic.        Dose / Directions Last dose taken    acetaminophen 500 MG tablet   Commonly known as:  TYLENOL   Dose:  1000 mg        Take 1,000 mg by mouth every 6 hours as needed for pain   Refills:  0        ALEVE 220 MG tablet   Generic drug:  naproxen sodium        Take by mouth as needed for moderate pain Reported on 3/22/2017   Refills:  0        amLODIPine 5 MG tablet    Commonly known as:  NORVASC   Dose:  2.5 mg   Quantity:  180 tablet        Take 0.5 tablets (2.5 mg) by mouth daily   Refills:  4        aspirin 81 MG tablet   Dose:  1 tablet        Take 1 tablet by mouth daily.   Refills:  0        clotrimazole 1 % cream   Commonly known as:  LOTRIMIN   Quantity:  30 g        Apply topically 2 times daily   Refills:  1        docusate calcium 240 MG capsule   Commonly known as:  SURFAK        Refills:  0        fexofenadine 180 MG tablet   Commonly known as:  ALLEGRA   Dose:  180 mg        Take 180 mg by mouth daily Reported on 3/22/2017   Refills:  0        folic acid 1 MG tablet   Commonly known as:  FOLVITE   Dose:  1 mg        Take 1 mg by mouth daily   Refills:  0        Menthol (Topical Analgesic) 2 % Gel        Refills:  0        methotrexate 2.5 MG tablet CHEMO   Dose:  25 mg   Indication:  10 tabs at once        Take 25 mg by mouth once a week On Tuesdays   Refills:  0        PRESERVISION AREDS PO   Dose:  1 tablet        Take 1 tablet by mouth every morning   Refills:  0        REMICADE IV        Infusion every 6 weeks   Refills:  0        simvastatin 20 MG tablet   Commonly known as:  ZOCOR   Dose:  20 mg   Quantity:  90 tablet        Take 1 tablet (20 mg) by mouth At Bedtime   Refills:  4        triamterene-hydrochlorothiazide 37.5-25 MG per tablet   Commonly known as:  MAXZIDE-25   Dose:  2 tablet   Quantity:  180 tablet        Take 2 tablets by mouth daily   Refills:  1                Prescriptions were sent or printed at these locations (1 Prescription)                   Other Prescriptions                Printed at Department/Unit printer (1 of 1)         oxyCODONE (ROXICODONE) 5 MG IR tablet                Procedures and tests performed during your visit     Abd/pelvis CT,  IV  contrast only TRAUMA / AAA    Basic metabolic panel (BMP)    CBC + differential    Lactic acid whole blood    UA with Microscopic      Orders Needing Specimen Collection     None       Pending Results     No orders found from 9/14/2017 to 9/17/2017.            Pending Culture Results     No orders found from 9/14/2017 to 9/17/2017.            Pending Results Instructions     If you had any lab results that were not finalized at the time of your Discharge, you can call the ED Lab Result RN at [phone]. You will be contacted by this team for any positive Lab results or changes in treatment. The nurses are available 7 days a week from 10A to 6:30P.  You can leave a message 24 hours per day and they will return your call.        Test Results From Your Hospital Stay        9/16/2017  8:29 PM      Component Results     Component Value Ref Range & Units Status    WBC 8.7 4.0 - 11.0 10e9/L Final    RBC Count 4.07 (L) 4.4 - 5.9 10e12/L Final    Hemoglobin 12.3 (L) 13.3 - 17.7 g/dL Final    Hematocrit 37.5 (L) 40.0 - 53.0 % Final    MCV 92 78 - 100 fl Final    MCH 30.2 26.5 - 33.0 pg Final    MCHC 32.8 31.5 - 36.5 g/dL Final    RDW 14.4 10.0 - 15.0 % Final    Platelet Count 243 150 - 450 10e9/L Final    Diff Method Automated Method  Final    % Neutrophils 63.1 % Final    % Lymphocytes 26.5 % Final    % Monocytes 8.9 % Final    % Eosinophils 1.1 % Final    % Basophils 0.3 % Final    % Immature Granulocytes 0.1 % Final    Nucleated RBCs 0 0 /100 Final    Absolute Neutrophil 5.5 1.6 - 8.3 10e9/L Final    Absolute Lymphocytes 2.3 0.8 - 5.3 10e9/L Final    Absolute Monocytes 0.8 0.0 - 1.3 10e9/L Final    Absolute Eosinophils 0.1 0.0 - 0.7 10e9/L Final    Absolute Basophils 0.0 0.0 - 0.2 10e9/L Final    Abs Immature Granulocytes 0.0 0 - 0.4 10e9/L Final    Absolute Nucleated RBC 0.0  Final         9/16/2017  8:44 PM      Component Results     Component Value Ref Range & Units Status    Sodium 136 133 - 144 mmol/L Final    Potassium 3.5 3.4 - 5.3 mmol/L Final    Chloride 102 94 - 109 mmol/L Final    Carbon Dioxide 26 20 - 32 mmol/L Final    Anion Gap 8 3 - 14 mmol/L Final    Glucose 100 (H) 70 - 99 mg/dL  Final    Urea Nitrogen 15 7 - 30 mg/dL Final    Creatinine 0.88 0.66 - 1.25 mg/dL Final    GFR Estimate 84 >60 mL/min/1.7m2 Final    Non  GFR Calc    GFR Estimate If Black >90 >60 mL/min/1.7m2 Final    African American GFR Calc    Calcium 8.9 8.5 - 10.1 mg/dL Final         9/16/2017  8:29 PM      Component Results     Component Value Ref Range & Units Status    Lactic Acid 1.3 0.7 - 2.0 mmol/L Final         9/16/2017  9:57 PM      Component Results     Component Value Ref Range & Units Status    Color Urine Straw  Final    Appearance Urine Clear  Final    Glucose Urine Negative NEG^Negative mg/dL Final    Bilirubin Urine Negative NEG^Negative Final    Ketones Urine Negative NEG^Negative mg/dL Final    Specific Gravity Urine 1.014 1.003 - 1.035 Final    Blood Urine Negative NEG^Negative Final    pH Urine 6.0 5.0 - 7.0 pH Final    Protein Albumin Urine Negative NEG^Negative mg/dL Final    Urobilinogen mg/dL 0.0 0.0 - 2.0 mg/dL Final    Nitrite Urine Negative NEG^Negative Final    Leukocyte Esterase Urine Negative NEG^Negative Final    Source Midstream Urine  Final    WBC Urine <1 0 - 2 /HPF Final    RBC Urine 1 0 - 2 /HPF Final    Squamous Epithelial /HPF Urine <1 0 - 1 /HPF Final         9/16/2017  9:52 PM      Narrative     CT ABDOMEN AND PELVIS WITH CONTRAST  9/16/2017 9:21 PM    HISTORY: Abdominal pain and umbilical hernia.    COMPARISON: A CT on 12/22/2009.    TECHNIQUE: Routine transverse CT imaging of the abdomen and pelvis was  performed following the uneventful administration of 100mL Isovue-370  intravenous contrast. Radiation dose for this scan was reduced using  automated exposure control, adjustment of the mA and/or kV according  to patient size, or iterative reconstruction technique.    FINDINGS: The visualized lung bases are clear. There has been slight  increase in size of what is now a 1.0 cm low-density lesion in the  medial aspect of the left hepatic lobe. Previously it measured 0.9  cm  and again likely represents a cyst or hemangioma. No other hepatic  abnormality is seen. The spleen, pancreas, gallbladder, and adrenal  glands remain normal. Again seen is a just over 1 cm cyst of the right  kidney. The kidneys and bladder are otherwise unremarkable. No  enlarged lymph node or other abnormal mass is demonstrated. No free  fluid is seen. No free intraperitoneal gas is identified. The  gastrointestinal tract is unremarkable. There is a normal-appearing  appendix. There is calcification in the vascular structures. No other  vascular abnormality is seen. There are surgical changes of the left  pelvic bones. No other osseous abnormality is noted. There has been  development of a paraumbilical hernia. This measures approximately 2.7  cm in diameter at its base while the hernia components measure 3.8 cm  in diameter in the subcutaneous tissues. This appears to contain only  mesenteric fat. However, there is moderate inflammation of this fat.  No other abdominal or pelvic wall pathology is seen.         Impression     IMPRESSION: There is a several cm diameter paraumbilical hernia  containing mesenteric fat. There is inflammation of this fat  suggesting it could be symptomatic.    DIDIER JEAN-BAPTISTE MD                Clinical Quality Measure: Blood Pressure Screening     Your blood pressure was checked while you were in the emergency department today. The last reading we obtained was  BP: 144/76 . Please read the guidelines below about what these numbers mean and what you should do about them.  If your systolic blood pressure (the top number) is less than 120 and your diastolic blood pressure (the bottom number) is less than 80, then your blood pressure is normal. There is nothing more that you need to do about it.  If your systolic blood pressure (the top number) is 120-139 or your diastolic blood pressure (the bottom number) is 80-89, your blood pressure may be higher than it should be. You should  have your blood pressure rechecked within a year by a primary care provider.  If your systolic blood pressure (the top number) is 140 or greater or your diastolic blood pressure (the bottom number) is 90 or greater, you may have high blood pressure. High blood pressure is treatable, but if left untreated over time it can put you at risk for heart attack, stroke, or kidney failure. You should have your blood pressure rechecked by a primary care provider within the next 4 weeks.  If your provider in the emergency department today gave you specific instructions to follow-up with your doctor or provider even sooner than that, you should follow that instruction and not wait for up to 4 weeks for your follow-up visit.        Thank you for choosing [facility]       Thank you for choosing [facility] for your care. Our goal is always to provide you with excellent care. Hearing back from our patients is one way we can continue to improve our services. Please take a few minutes to complete the written survey that you may receive in the mail after you visit with us. Thank you!        [employer] Information     [facility] gives you secure access to your electronic health record. If you see a primary care provider, you can also send messages to your care team and make appointments. If you have questions, please call your primary care clinic.  If you do not have a primary care provider, please call [phone] and they will assist you.        Care EveryWhere ID     This is your Care EveryWhere ID. This could be used by other organizations to access your [facility] medical records  [id number]        Equal Access to Services     DAO TELLEZ : Hadii simona Long, seth salcedo, qaybta wei lewsi . So [place] -[phone].    ATENCIÓN: Si habla español, tiene a bautista disposición servicios gratuitos de asistencia lingüística. Llame al [phone].    We comply with applicable federal  civil rights laws and [place] laws. We do not discriminate on the basis of race, color, national origin, age, disability sex, sexual orientation or gender identity.            After Visit Summary       This is your record. Keep this with you and show to your community pharmacist(s) and doctor(s) at your next visit.

## 2017-09-17 NOTE — ED NOTES
Has had umbilical hernia for years without problem  This past week noted hernia has increased in size and changed in color   Pain with palpating   Here for evaluation   Rates pain 8/10 if pushing on it  No other c/o

## 2017-09-17 NOTE — TELEPHONE ENCOUNTER
"Reyeser has a long term umbilical hernia that over the past week has become progressively tender; has been referred to a surgeon by PCP but will not see him for > 10 days; pain worse tonight that yesterday a.m.; Has not been able to reduce hernia for many years; size has not increased and is about \"golf ball\" size; perimeter is  more painful that center; color is light purple per wife; warm to touch and soft.    Triage protocol reviewed  Advised ED  evaluation due to weekend and will comply   Reason for Disposition    Hernia is painful or tender to touch    Protocols used: HERNIA-ADULT-  Pamela Montes RN  [facility]    "

## 2017-09-17 NOTE — DISCHARGE INSTRUCTIONS
Please follow up closely with the surgeon. Please return to the ED if your symptoms worsen or if you develop new or concerning symptoms.     Discharge Instructions  Abdominal Pain    Abdominal pain can be caused by many things. Your evaluation today does not show the exact cause for your pain. Your doctor today has decided that it is unlikely your pain is due to a life threatening problem, or a problem requiring surgery or hospital admission. Sometimes those problems cannot be found right away, so it is very important that you follow up as directed.  Sometimes only the changes which occur over time allow the cause of your pain to be found.    Return to the Emergency Department for a recheck in 8-12 hours if your pain continues.  If your pain gets worse, changes in location, or feels different, return to the Emergency Department right away.    ADULTS:  Return to the Emergency Department right away if:      You get an oral temperature above 102oF or as directed by your doctor.    You have blood in your stools (bright red or black, tarry stools).    You keep throwing up or can t drink liquids.    You see blood when you throw up.    You can t have a bowel movement or you can t pass gas.    Your stomach gets bloated or bigger.    Your skin or the whites of your eyes look yellow.    You faint.    You have bloody, frequent or painful urination.    You have new symptoms or anything that worries you.    CHILDREN:  Return to the Emergency Department right away if your child has any of the above-listed symptoms or the following:      Pushes your hand away or screams/cries when his/her belly is touched.    You notice your child is very fussy or weak.    Your child is very tired and is too tired to eat or drink.    Your child is dehydrated.  Signs of dehydration can be:  o Your infant has had no wet diapers in 4-5 hours.  o Your older child has not passed urine in 6-8 hours.  o Your infant or child starts to have dry mouth and  lips, or no saliva or tears.    PREGNANT WOMEN:  Return to the Emergency Department right away if you have any of the above-listed symptoms or the following:      You have bleeding, leaking fluid or passing tissue from the vagina.    You have worse pain or cramping, or pain in your shoulder or back.    You have vomiting that will not stop.    You have painful or bloody urination.    You have a temperature of 100oF or more.    Your baby is not moving as much as usual.    You faint.    You get a bad headache with or without eye problems and abdominal pain.    You have a convulsion or seizure.    You have unusual discharge from your vagina and abdominal pain.    Abdominal pain is pretty common during pregnancy.  Your pain may or may not be related to your pregnancy. You should follow-up closely with your OB doctor so they can evaluate you and your baby.  Until you follow-up with your regular doctor, do the following:       Avoid sex and do not put anything in your vagina.    Drink clear fluids.    Only take medications approved by your doctor.    MORE INFORMATION:    Appendicitis:  A possible cause of abdominal pain in any person who still has their appendix is acute appendicitis. Appendicitis is often hard to diagnose.  Testing does not always rule out early appendicitis or other causes of abdominal pain. Close follow-up with your doctor and re-evaluations may be needed to figure out the reason for your abdominal pain.    Follow-up:  It is very important that you make an appointment with your clinic and go to the appointment.  If you do not follow-up with your primary doctor, it may result in missing an important development which could result in permanent injury or disability and/or lasting pain.  If there is any problem keeping your appointment, call your doctor or return to the Emergency Department.    Medications:  Take your medications as directed by your doctor today.  Before using over-the-counter medications,  "ask your doctor and make sure to take the medications as directed.  If you have any questions about medications, ask your doctor.    Diet:  Resume your normal diet as much as possible, but do not eat fried, fatty or spicy foods while you have pain.  Do not drink alcohol or have caffeine.  Do not smoke tobacco.    Probiotics: If you have been given an antibiotic, you may want to also take a probiotic pill or eat yogurt with live cultures. Probiotics have \"good bacteria\" to help your intestines stay healthy. Studies have shown that probiotics help prevent diarrhea and other intestine problems (including C. diff infection) when you take antibiotics. You can buy these without a prescription in the pharmacy section of the store.     If you were given a prescription for medicine here today, be sure to read all of the information (including the package insert) that comes with your prescription.  This will include important information about the medicine, its side effects, and any warnings that you need to know about.  The pharmacist who fills the prescription can provide more information and answer questions you may have about the medicine.  If you have questions or concerns that the pharmacist cannot address, please call or return to the Emergency Department.         Opioid Medication Information    Pain medications are among the most commonly prescribed medicines, so we are including this information for all our patients. If you did not receive pain medication or get a prescription for pain medicine, you can ignore it.     You may have been given a prescription for an opioid (narcotic) pain medicine and/or have received a pain medicine while here in the Emergency Department. These medicines can make you drowsy or impaired. You must not drive, operate dangerous equipment, or engage in any other dangerous activities while taking these medications. If you drive while taking these medications, you could be arrested for DUI, " or driving under the influence. Do not drink any alcohol while you are taking these medications.     Opioid pain medications can cause addiction. If you have a history of chemical dependency of any type, you are at a higher risk of becoming addicted to pain medications.  Only take these prescribed medications to treat your pain when all other options have been tried. Take it for as short a time and as few doses as possible. Store your pain pills in a secure place, as they are frequently stolen and provide a dangerous opportunity for children or visitors in your house to start abusing these powerful medications. We will not replace any lost or stolen medicine.  As soon as your pain is better, you should flush all your remaining medication.     Many prescription pain medications contain Tylenol  (acetaminophen), including Vicodin , Tylenol #3 , Norco , Lortab , and Percocet .  You should not take any extra pills of Tylenol  if you are using these prescription medications or you can get very sick.  Do not ever take more than 3000 mg of acetaminophen in any 24 hour period.    All opioids tend to cause constipation. Drink plenty of water and eat foods that have a lot of fiber, such as fruits, vegetables, prune juice, apple juice and high fiber cereal.  Take a laxative if you don t move your bowels at least every other day. Miralax , Milk of Magnesia, Colace , or Senna  can be used to keep you regular.      Remember that you can always come back to the Emergency Department if you are not able to see your regular doctor in the amount of time listed above, if you get any new symptoms, or if there is anything that worries you.

## 2017-09-17 NOTE — ED PROVIDER NOTES
History     Chief Complaint:  Abdominal Pain     HPI   Raul Younger is a 81 year old male who presents accompanied by his wife and daughter for evaluation of abdominal pain. The patient reports a longstanding history of an umbilical hernia. Approximately a week ago, the patient's hernia started to increase in size and he started to have difficulty reducing the hernia at home. Since then, it has been progressively becoming larger and more painful, and he has developed redness of the skin at the site of the hernia. The patient additionally complains of pain at the site of the hernia that is triggered with palpation and movement of the abdomen. The patient did contact his primary care physician, Dr. Vasquez, and he was set up for a surgery consultation on 9/25. However, due to his worsening pain at the site of the hernia, the patient presents to the ED tonight. Currently in the ED, the patient rates his abdominal pain at a severity of 8/10 when the hernia is touched or when he moves, but he has no pain at rest. Otherwise, he has not had any fever, nausea, vomiting, diarrhea, constipation, dysuria, hematuria, or urinary frequency in association with his current symptoms, and his abdominal pain is only at the site of his umbilical hernia.     Allergies:  Latex   Nifedipine   Procardia      Medications:    triamterene-hydrochlorothiazide (MAXZIDE-25) 37.5-25 MG per tablet  amLODIPine (NORVASC) 5 MG tablet  clotrimazole (LOTRIMIN) 1 % cream  Menthol, Topical Analgesic, 2 % GEL  docusate calcium (SURFAK) 240 MG capsule  naproxen sodium (ALEVE) 220 MG tablet  simvastatin (ZOCOR) 20 MG tablet  InFLIXimab (REMICADE IV)  acetaminophen (TYLENOL) 500 MG tablet  methotrexate 2.5 MG tablet  folic acid (FOLVITE) 1 MG tablet  fexofenadine (ALLEGRA) 180 MG tablet  Multiple Vitamins-Minerals (PRESERVISION AREDS PO)  aspirin 81 MG tablet    Past Medical History:    Arthritis  Hypertension  Malignant neoplasm  Migraine headaches  "  Myocardial infarction  Hyperlipidemia   Sleep apnea     Past Surgical History:    Paraesophageal hernia repair   Tonsillectomy   Colonoscopy   Cystoscopy   ENT surgery  Genitourinary surgery  Head and neck surgery  Irrigation and debridement hip, combined, left  Orthopedic surgery  Phacoemulsification clear cornea with toric intraocular lens implant   Prostate surgery   Vitrectomy parsplana with 23 gauge system     Family History:    ALS - Father  CAD - Father   Heart disease - Father and sister   Arthritis - Sister     Social History:  Tobacco use:    Former smoker - 1.00 packs / day for 10 years, quit 1972  Alcohol use:    Positive, occasionally  Marital status:       Accompanied to ED by:  Wife and daughter      Review of Systems   Constitutional: Negative for fever.   Gastrointestinal: Positive for abdominal pain. Negative for constipation, diarrhea, nausea and vomiting.   Genitourinary: Negative for dysuria, frequency and hematuria.   All other systems reviewed and are negative.    Physical Exam   First Vitals:  BP: 166/84  Pulse: 68  Temp: 98.9  F (37.2  C)  Resp: 17  Height: 175.3 cm (5' 9\")  Weight: 100.7 kg (222 lb)  SpO2: 100 %      Physical Exam  Constitutional: The patient is oriented to person, place, and time. Alert and cooperative.  HENT:   Right Ear: External ear normal.   Left Ear: External ear normal.   Nose: Nose normal.   Mouth/Throat: Uvula is midline, oropharynx is clear and moist and mucous membranes are normal. No posterior oropharyngeal edema or erythema.   Eyes: Conjunctivae, EOM and lids are normal. Pupils are equal, round, and reactive to light.   Neck: Trachea normal. Normal range of motion. Neck supple.   Cardiovascular: Normal rate, regular rhythm, normal heart sounds, and intact distal pulses.    Pulmonary/Chest: Effort normal and breath sounds equal bilaterally. No crackles or wheezing.   Abdominal: Soft. Paraumbilical hernia present with mild erythema overlying the hernia " site. Tenderness with palpation over the hernia. Abdomen otherwise non-tender throughout. No rebound and no guarding.   Musculoskeletal: Normal range of motion.  No extremity tenderness or edema.  Neurological: Alert and Oriented. Strength 5/5 in upper and lower extremities bilaterally. Sensation intact to light touch throughout.  Skin: Skin is dry. No rash noted.          Emergency Department Course     Imaging:  Radiographic findings were communicated with the patient and family who voiced understanding of the findings.    Abd/Pelvis CT, IV Contrast only Trauma / AAA:   IMPRESSION: There is a several cm diameter paraumbilical hernia containing mesenteric fat. There is inflammation of this fat suggesting it could be symptomatic.  Per radiology.     Laboratory:  CBC: HGB 12.3 low, o/w WNL (WBC 8.7, )  BMP: Glucose 100 high, o/w WNL (Creatinine 0.88)  Lactic acid whole blood: 1.3   UA with Microscopic: Negative       Emergency Department Course:  Nursing notes and vitals reviewed.  2005: I performed an exam of the patient as documented above.     2223: I spoke with Dr. Benites of the general surgery service regarding patient's presentation, findings, and plan of care.    2235: I updated and reassessed the patient.     I personally reviewed the laboratory results with the Patient and spouse and daughter and answered all related questions prior to discharge.      Findings and plan explained to the Patient and spouse and daughter. Patient discharged home with instructions regarding supportive care, medications, and reasons to return. The importance of close follow-up was reviewed. The patient was prescribed Roxicodone.      Impression & Plan      Medical Decision Making:  Raul Younger is a 81 year old male with a history of paraumbilical hernia who presents to the emergency department for evaluation of abdominal pain. Upon presentation in the ED, the patient is non-toxic appearing. He is hypertensive, but  vitals are otherwise within normal limits and stable. On exam, he is well-appearing. He is alert, oriented, and neurologic exam is non-focal. Cardiopulmonary exam is unremarkable. On abdominal examination, he does have evidence of a paraumbilical hernia. The skin in this region is mildly erythematous and tender to palpation. There is no rebound or guarding. The rest of his exam is as mentioned above. Labs were obtained and are as mentioned above. Notably, he does not have a leukocytosis and lactate is within normal limits. CT was obtained and does demonstrate a several centimeter diameter paraumbilical hernia containing mesenteric fat. There is no evidence of bowel in the hernia. There is inflammation of this fat, suggesting it could be symptomatic. These results were discussed with the patient and his family and they note understanding. The patient was then discussed with Dr. Benites of general surgery. He did look at the CT images and notes that he is in agreement that there is no evidence of bowel in the hernia. He notes that there is no indication for any emergent surgical intervention at this time. He notes that the patient does not necesarily require admission to the hospital. He does note that the patient can actually be discharged to home with close follow up as an outpatient. However, he notes that if the patient's pain is not well-managed, then he could be admitted for pain management. I did discuss this thoroughly with the patient and I did offer admission for symptomatic management. The patient notes that his pain is well-controlled, and he only has pain with movement or touching of the paraumbilical hernia region. Therefore, he declines admission for pain management. He notes that he would prefer to be discharged to home at this time. The patient's lab evaluation is reassuring. He has no evidence to suggest a superimposed infectious process. I did discuss with the patient that if symptoms worsen, I do  recommend immediate return to the emergency department. He notes understanding and agreement. He was provided a referral to follow up with general surgery on Monday. He was stable / improved at the time of discharge.     Diagnosis:    ICD-10-CM   1. Paraumbilical hernia K42.9   2. Periumbilical abdominal pain R10.33       Disposition:  Discharged to home with Roxicodone.     Discharge Medications:  New Prescriptions    OXYCODONE (ROXICODONE) 5 MG IR TABLET    Take 1 tablet (5 mg) by mouth every 6 hours as needed for pain         Reggie GA, am serving as a scribe at 8:05 PM on 9/16/2017 to document services personally performed by Dr. Neves, based on my observations and the provider's statements to me.    [facility] EMERGENCY DEPARTMENT       Masha Neves MD  09/17/17 [id number]

## 2017-09-18 ENCOUNTER — OFFICE VISIT (OUTPATIENT)
Dept: SURGERY | Facility: CLINIC | Age: 82
End: 2017-09-18
Payer: COMMERCIAL

## 2017-09-18 VITALS
HEART RATE: 60 BPM | WEIGHT: 216 LBS | HEIGHT: 70 IN | SYSTOLIC BLOOD PRESSURE: 122 MMHG | BODY MASS INDEX: 30.92 KG/M2 | OXYGEN SATURATION: 94 % | DIASTOLIC BLOOD PRESSURE: 76 MMHG

## 2017-09-18 DIAGNOSIS — K42.9 UMBILICAL HERNIA WITHOUT OBSTRUCTION AND WITHOUT GANGRENE: Primary | ICD-10-CM

## 2017-09-18 PROCEDURE — 99204 OFFICE O/P NEW MOD 45 MIN: CPT | Performed by: SURGERY

## 2017-09-18 ASSESSMENT — ENCOUNTER SYMPTOMS: ABDOMINAL PAIN: 1

## 2017-09-18 NOTE — LETTER
2017      RE:  Raul Younger-:  [birth year]    Assessment:    Raul Younger is seen in consultation for an incarcerated umbilical hernia, at the request of Jose D Vasquez MD.     Primary incarcerated umbilical hernia.     Plan:    We have discussed observation, reduction techniques and importance, incarceration and strangulation signs, symptoms and importance as well as need to seek emergency treatment.       We have discussed open umbilical hernia repair with mesh in detail, including benefits, alternatives, complications, incision, scar, mesh, infection, anesthesia, bleeding, blood transfusion, DVT, PE, hernia recurrence, lifting and activity limits after surgery.  All questions have been answered to the best of my ability.  He is at increased risk for hernia recurrence and infection due to immunosuppression.     He has been given literature to review.   We will schedule surgery at the patient's convenience.        HPI:  Raul is a 81 year old male with a history of sleep apnea, hyperlipidemia, hypertension, rheumatoid arthritis (Remicade, methotrexate), and prostate cancer who presents for evaluation of a lump in the tracy-umbilical region.  He first noticed it many years ago.  It became larger and painful one week ago.  He presents to the emergency room two days ago.  A CT abdomen and pelvis was obtained which revealed a 3.3 cm hernia defect containing incarcerated and edematous adipose tissue.  He currently complains of pain with movement.  He denies nausea and vomiting.  Negative for associated symptoms of fever and chills.  He has not had previous surgery in this location.  He has not had a previous herniorrhaphy in this location.  He denies heavy lifting.     Constipation: Yes x 2 days  Cough: No  Diabetes: No  Current Smoker: No     Past Medical History:   has a past medical history of Arthritis; HTN; Malignant neoplasm (H) (2011); MI, old; Migraine, unspecified, without mention  "of intractable migraine without mention of status migrainosus; Other and unspecified hyperlipidemia; Other and unspecified hyperlipidemia; and Sleep apnea.     Past Surgical History:    Family history reviewed and not pertinent.     ROS:  The 10 point review of systems is negative other than noted in the HPI and above.     PE:    Vitals: /76  Pulse 60  Ht 5' 10\" (1.778 m)  Wt 216 lb (98 kg)  SpO2 94%  BMI 30.99 kg/m2  BMI= Body mass index is 30.99 kg/(m^2).  General - Well developed, well nourished male in no apparent distress  HEENT:  Head normocephalic and atraumatic, pupils equal and round, conjunctivae clear, no scleral icterus, mucous membranes moist, external ears and nose normal  Abdomen:  abdomen is soft without significant tenderness, masses, organomegaly or guarding                        Hernia:  umbilical, 4 cm lump with erythema of the overlying skin, incarcerated, moderately tender.                      There is a well-healed supraumbilical longitudinal incisional scar.  Extremities: Warm without edema  Musculoskeletal:  Normal station and gait  Neurologic: alert, speech is clear, moves all extremities with good strength  Psychiatric: Mood and affect appropriate  Skin: Without lesions or rashes, or juandice     This note was created using voice recognition software. Undetected word substitutions or other errors may have occurred.        Cristina Robbins MD    "

## 2017-09-18 NOTE — PROGRESS NOTES
HPI      ROS (Review of Systems):     GASTROINTESTINAL: Positive for abdominal pain.          Physical Exam      Assessment:    Raul Younger is seen in consultation for an incarcerated umbilical hernia, at the request of Jose D Vasquez MD.    Primary incarcerated umbilical hernia.    Plan:    We have discussed observation, reduction techniques and importance, incarceration and strangulation signs, symptoms and importance as well as need to seek emergency treatment.      We have discussed open umbilical hernia repair with mesh in detail, including benefits, alternatives, complications, incision, scar, mesh, infection, anesthesia, bleeding, blood transfusion, DVT, PE, hernia recurrence, lifting and activity limits after surgery.  All questions have been answered to the best of my ability.  He is at increased risk for hernia recurrence and infection due to immunosuppression.    He has been given literature to review.   We will schedule surgery at the patient's convenience.      HPI:  Raul is a 81 year old male with a history of sleep apnea, hyperlipidemia, hypertension, rheumatoid arthritis (Remicade, methotrexate), and prostate cancer who presents for evaluation of a lump in the tracy-umbilical region.  He first noticed it many years ago.  It became larger and painful one week ago.  He presents to the emergency room two days ago.  A CT abdomen and pelvis was obtained which revealed a 3.3 cm hernia defect containing incarcerated and edematous adipose tissue.  He currently complains of pain with movement.  He denies nausea and vomiting.  Negative for associated symptoms of fever and chills.  He has not had previous surgery in this location.  He has not had a previous herniorrhaphy in this location.  He denies heavy lifting.    Constipation: Yes x 2 days  Cough: No  Diabetes: No  Current Smoker: No    Past Medical History:   has a past medical history of Arthritis; HTN; Malignant neoplasm (H) (01/2011); MI, old;  Migraine, unspecified, without mention of intractable migraine without mention of status migrainosus; Other and unspecified hyperlipidemia; Other and unspecified hyperlipidemia; and Sleep apnea.    Past Surgical History:  Past Surgical History:   Procedure Laterality Date     ABDOMEN SURGERY  1980    diaphram repair     C NONSPECIFIC PROCEDURE  1996    Paraesophageal Hernia Repair. Abstracted 5/15/02.     C NONSPECIFIC PROCEDURE  1943    Tonsillectomy. Abstracted 5/15/02.     COLONOSCOPY  12/29/2011    Diverticuli, 3 mm tissue biopsied, path showed no hyperplastic or adenomatous elements     CYSTOSCOPY       ENT SURGERY       GENITOURINARY SURGERY       HEAD & NECK SURGERY       IRRIGATION AND DEBRIDEMENT HIP, COMBINED Left 11/7/2015    Procedure: COMBINED IRRIGATION AND DEBRIDEMENT HIP;  Surgeon: Stanislav Maharaj MD;  Location: [facility]     ORTHOPEDIC SURGERY       PHACOEMULSIFICATION CLEAR CORNEA WITH TORIC INTRAOCULAR LENS IMPLANT  5/23/2012    Procedure:PHACOEMULSIFICATION CLEAR CORNEA WITH TORIC INTRAOCULAR LENS IMPLANT; RIGHT PHACOEMULSIFICATION CLEAR CORNEA WITH  DELUXE TORIC INTRAOCULAR LENS IMPLANT; Surgeon:ANDRA LAGUNAS; Location:[facility]     PHACOEMULSIFICATION CLEAR CORNEA WITH TORIC INTRAOCULAR LENS IMPLANT  5/30/2012    Procedure:PHACOEMULSIFICATION CLEAR CORNEA WITH TORIC INTRAOCULAR LENS IMPLANT; LEFT PHACOEMULSIFICATION CLEAR CORNEA WITH TORIC INTRAOCULAR LENS IMPLANT ; Surgeon:ANDRA LAGUNAS; Location:[facility]     PROSTATE SURGERY  1/2010     VITRECTOMY PARSPLANA WITH 23 GAUGE SYSTEM  1/11/2013    Procedure: VITRECTOMY PARSPLANA WITH 23 GAUGE SYSTEM;  LEFT 23 GAUGE VITRECTOMY, EPIRETINAL MEMBRANE REMOVAL, AIR FLUID EXCHANGE ;  Surgeon: Lawson Celeste MD;  Location: [facility]     VITRECTOMY PARSPLANA WITH 23 GAUGE SYSTEM  2/4/2014    Procedure: VITRECTOMY PARSPLANA WITH 23 GAUGE SYSTEM;  RIGHT VITRECTOMY PARSPLANA WITH 23 GAUGE SYSTEM, EPIRETINAL MEMBRANE REMOVAL, AIR/FLUID EXCHANGE ;  Surgeon: Booker  "MD Lawson;  Location: [facility]        Social History:  Social History     Social History     Marital status:      Spouse name: N/A     Number of children: 1     Years of education: N/A     Occupational History     [employer],[address]     Worked at [employer] until 58 yo, then part time until age 67     Social History Main Topics     Smoking status: Former Smoker     Packs/day: 1.00     Years: 10.00     Types: Cigarettes, Pipe     Quit date: 1972     Smokeless tobacco: Never Used     Alcohol use No      Comment: occ wine     Drug use: No     Sexual activity: Not Currently     Partners: Female     Other Topics Concern     Not on file     Social History Narrative    Structured Exercises 3 days/week, 3 other days/week uses treadmill or bike.         Family History:  Family History   Problem Relation Age of Onset     Neurologic Disorder Father       age 70, ALS, laryngeal CA     C.A.D. Father      HEART DISEASE Father      Hyperlipidemia Father      Hypertension Father      Family History Negative Mother       age 99 after hip fx, was almost 100     Hypertension Mother      Hyperlipidemia Mother      HEART DISEASE Sister      Born 193     Coronary Artery Disease Sister      Hypertension Sister      Arthritis Sister      Born 1933     Hypertension Daughter      Family history reviewed and not pertinent.    ROS:  The 10 point review of systems is negative other than noted in the HPI and above.    PE:    Vitals: /76  Pulse 60  Ht 5' 10\" (1.778 m)  Wt 216 lb (98 kg)  SpO2 94%  BMI 30.99 kg/m2  BMI= Body mass index is 30.99 kg/(m^2).  General - Well developed, well nourished male in no apparent distress  HEENT:  Head normocephalic and atraumatic, pupils equal and round, conjunctivae clear, no scleral icterus, mucous membranes moist, external ears and nose normal  Abdomen:  abdomen is soft without significant tenderness, masses, organomegaly or guarding   Hernia:  umbilical, " 4 cm lump with erythema of the overlying skin, incarcerated, moderately tender.   There is a well-healed supraumbilical longitudinal incisional scar.  Extremities: Warm without edema  Musculoskeletal:  Normal station and gait  Neurologic: alert, speech is clear, moves all extremities with good strength  Psychiatric: Mood and affect appropriate  Skin: Without lesions or rashes, or juandice    This note was created using voice recognition software. Undetected word substitutions or other errors may have occurred.     Time spent with the patient with greater that 50% of the time in discussion was 20 minutes.     Cristina Robbins MD    Please route or send letter to:  Primary Care Provider (PCP) and Referring Provider

## 2017-09-18 NOTE — MR AVS SNAPSHOT
After Visit Summary   9/18/2017    Raul Younger    MRN: [id number]           Patient Information     Date Of Birth          1935        Visit Information        Provider Department      9/18/2017 3:00 PM Cristina Robbins MD [facility] General Surgery      Today's Diagnoses     Umbilical hernia without obstruction and without gangrene    -  1       Follow-ups after your visit        Your next 10 appointments already scheduled     Sep 19, 2017  7:40 AM CDT   Pre-Op physical with Clay Geller MD   [facility]    [address] [place]  [facility] [address]   [phone]            Sep 21, 2017   Procedure with Cristina Robbins MD   [facility] (--)    [address]  [facility] [address]   [phone]            Sep 21, 2017  1:00 PM CDT   [facility] Same Day Surgery with Cristina Robbins MD, Tracey Nassar PA-C   [facility]    [facility])   [phone]            Sourav 15, 2018  1:00 PM CST   Return Prostate Cancer with Bhavin Celeste MD   [facility] ([facility])    [address]  [address]  [facility] [address] [phone]            Jul 16, 2018 11:30 AM CDT   Return Sleep Patient with Yoan Maria MD   [facility] ([facility])    [address] [place] [address] [address]  [facility] [id number] [phone]              Who to contact     If you have questions or need follow up information about today's clinic visit or your schedule please contact [facility] directly at [phone].  Normal or non-critical lab and imaging results will be communicated to you by MyChart, letter or  "phone within 4 business days after the clinic has received the results. If you do not hear from us within 7 days, please contact the clinic through [employer] or phone. If you have a critical or abnormal lab result, we will notify you by phone as soon as possible.  Submit refill requests through [employer] or call your pharmacy and they will forward the refill request to us. Please allow 3 business days for your refill to be completed.          Additional Information About Your Visit        [employer] Information     [employer] gives you secure access to your electronic health record. If you see a primary care provider, you can also send messages to your care team and make appointments. If you have questions, please call your primary care clinic.  If you do not have a primary care provider, please call [phone] and they will assist you.        Care EveryWhere ID     This is your Care EveryWhere ID. This could be used by other organizations to access your [place] medical records  [id number]        Your Vitals Were     Pulse Height Pulse Oximetry BMI (Body Mass Index)          60 5' 10\" (1.778 m) 94% 30.99 kg/m2         Blood Pressure from Last 3 Encounters:   09/18/17 122/76   09/16/17 139/75   07/17/17 113/73    Weight from Last 3 Encounters:   09/18/17 216 lb (98 kg)   09/16/17 222 lb (100.7 kg)   07/17/17 223 lb (101.2 kg)              Today, you had the following     No orders found for display       Primary Care Provider Office Phone # Fax #    Jose D Vasquez [phone]       [address] [facility] [address]  [facility] [address]        Equal Access to Services     [facility]: Hadii aad ku hadasho Soomaali, waaxda luqadaha, qaybta kaalmada elan, wei harrison . So [facility] [phone].    ATENCIÓN: Si habla español, tiene a bautista disposición servicios gratuitos de asistencia lingüística. Llame al [phone].    We comply with applicable federal civil rights laws and [place] " laws. We do not discriminate on the basis of race, color, national origin, age, disability sex, sexual orientation or gender identity.            Thank you!     Thank you for choosing [facility]  for your care. Our goal is always to provide you with excellent care. Hearing back from our patients is one way we can continue to improve our services. Please take a few minutes to complete the written survey that you may receive in the mail after your visit with us. Thank you!             Your Updated Medication List - Protect others around you: Learn how to safely use, store and throw away your medicines at www.disposemymeds.org.          This list is accurate as of: 9/18/17  4:47 PM.  Always use your most recent med list.                   Brand Name Dispense Instructions for use Diagnosis    acetaminophen 500 MG tablet    TYLENOL     Take 1,000 mg by mouth every 6 hours as needed for pain        ALEVE 220 MG tablet   Generic drug:  naproxen sodium      Take by mouth as needed for moderate pain Reported on 3/22/2017        amLODIPine 5 MG tablet    NORVASC    180 tablet    Take 0.5 tablets (2.5 mg) by mouth daily    Essential hypertension, benign       aspirin 81 MG tablet      Take 1 tablet by mouth daily.        clotrimazole 1 % cream    LOTRIMIN    30 g    Apply topically 2 times daily    Tinea cruris       docusate calcium 240 MG capsule    SURFAK          fexofenadine 180 MG tablet    ALLEGRA     Take 180 mg by mouth daily Reported on 3/22/2017        folic acid 1 MG tablet    FOLVITE     Take 1 mg by mouth daily        Menthol (Topical Analgesic) 2 % Gel           methotrexate 2.5 MG tablet CHEMO      Take 25 mg by mouth once a week On Tuesdays        oxyCODONE 5 MG IR tablet    ROXICODONE    12 tablet    Take 1 tablet (5 mg) by mouth every 6 hours as needed for pain        PRESERVISION AREDS PO      Take 1 tablet by mouth every morning        REMICADE IV      Infusion every 6 weeks         simvastatin 20 MG tablet    ZOCOR    90 tablet    Take 1 tablet (20 mg) by mouth At Bedtime    Mixed hyperlipidemia       triamterene-hydrochlorothiazide 37.5-25 MG per tablet    MAXZIDE-25    180 tablet    Take 2 tablets by mouth daily    Essential hypertension, benign, Edema, unspecified type

## 2017-09-19 ENCOUNTER — OFFICE VISIT (OUTPATIENT)
Dept: INTERNAL MEDICINE | Facility: CLINIC | Age: 82
End: 2017-09-19
Payer: COMMERCIAL

## 2017-09-19 VITALS
BODY MASS INDEX: 31.31 KG/M2 | WEIGHT: 218.7 LBS | HEART RATE: 63 BPM | SYSTOLIC BLOOD PRESSURE: 124 MMHG | DIASTOLIC BLOOD PRESSURE: 80 MMHG | HEIGHT: 70 IN | OXYGEN SATURATION: 96 % | TEMPERATURE: 98.2 F

## 2017-09-19 DIAGNOSIS — K42.9 UMBILICAL HERNIA WITHOUT OBSTRUCTION AND WITHOUT GANGRENE: ICD-10-CM

## 2017-09-19 DIAGNOSIS — Z01.818 PREOP GENERAL PHYSICAL EXAM: Primary | ICD-10-CM

## 2017-09-19 DIAGNOSIS — I10 ESSENTIAL HYPERTENSION, BENIGN: ICD-10-CM

## 2017-09-19 DIAGNOSIS — M06.9 RHEUMATOID ARTHRITIS, INVOLVING UNSPECIFIED SITE, UNSPECIFIED RHEUMATOID FACTOR PRESENCE: ICD-10-CM

## 2017-09-19 PROCEDURE — 99214 OFFICE O/P EST MOD 30 MIN: CPT | Performed by: FAMILY MEDICINE

## 2017-09-19 PROCEDURE — 93000 ELECTROCARDIOGRAM COMPLETE: CPT | Performed by: FAMILY MEDICINE

## 2017-09-19 RX ORDER — CETIRIZINE HYDROCHLORIDE 10 MG/1
10 TABLET ORAL DAILY
COMMUNITY
End: 2019-11-11

## 2017-09-19 NOTE — NURSING NOTE
"Chief Complaint   Patient presents with     Pre-Op Exam       Initial /80 (BP Location: Right arm, Patient Position: Sitting, Cuff Size: Adult Large)  Pulse 63  Temp 98.2  F (36.8  C) (Oral)  Ht 5' 10\" (1.778 m)  Wt 218 lb 11.2 oz (99.2 kg)  SpO2 96%  BMI 31.38 kg/m2 Estimated body mass index is 31.38 kg/(m^2) as calculated from the following:    Height as of this encounter: 5' 10\" (1.778 m).    Weight as of this encounter: 218 lb 11.2 oz (99.2 kg).  Medication Reconciliation: complete   Tarsha James MA      "

## 2017-09-19 NOTE — MR AVS SNAPSHOT
After Visit Summary   9/19/2017    Raul Younger    MRN: [id number]           Patient Information     Date Of Birth          1935        Visit Information        Provider Department      9/19/2017 7:40 AM Clay Geller MD [facility]        Today's Diagnoses     Preop general physical exam    -  1    Umbilical hernia without obstruction and without gangrene        Rheumatoid arthritis, involving unspecified site, unspecified rheumatoid factor presence (H)        Essential hypertension, benign          Care Instructions      Before Your Surgery      Call your surgeon if there is any change in your health. This includes signs of a cold or flu (such as a sore throat, runny nose, cough, rash or fever).    Do not smoke, drink alcohol or take over the counter medicine (unless your surgeon or primary care doctor tells you to) for the 24 hours before and after surgery.    If you take prescribed drugs: Follow your doctor s orders about which medicines to take and which to stop until after surgery.    Eating and drinking prior to surgery: follow the instructions from your surgeon    Take a shower or bath the night before surgery. Use the soap your surgeon gave you to gently clean your skin. If you do not have soap from your surgeon, use your regular soap. Do not shave or scrub the surgery site.  Wear clean pajamas and have clean sheets on your bed.           Follow-ups after your visit        Your next 10 appointments already scheduled     Sep 21, 2017   Procedure with Cristina Robbins MD   [facility] (--)    [address] [facility] [address]   [phone]            Sep 21, 2017  1:00 PM CDT   [facility] Same Day Surgery with Cristina Robbins MD, Tracey Nassar PA-C   [facility])    [facility])   [phone]        "     Sourav 15, 2018  1:00 PM CST   Return Prostate Cancer with Bhavin Celeste MD   [facility] ([facility])    [address] [facility]  [address]  [facility] [address] [phone]            Jul 16, 2018 11:30 AM CDT   Return Sleep Patient with Yoan Maria MD   [facility] ([facility])    [address] [facility]  [address]  [facility] [address] [phone]              Who to contact     If you have questions or need follow up information about today's clinic visit or your schedule please contact [facility] directly at [phone].  Normal or non-critical lab and imaging results will be communicated to you by [address], letter or phone within 4 business days after the clinic has received the results. If you do not hear from us within 7 days, please contact the clinic through [address] or phone. If you have a critical or abnormal lab result, we will notify you by phone as soon as possible.  Submit refill requests through [employer] or call your pharmacy and they will forward the refill request to us. Please allow 3 business days for your refill to be completed.          Additional Information About Your Visit        [employer] Information     [employer] gives you secure access to your electronic health record. If you see a primary care provider, you can also send messages to your care team and make appointments. If you have questions, please call your primary care clinic.  If you do not have a primary care provider, please call [phone] and they will assist you.        Care EveryWhere ID     This is your Care EveryWhere ID. This could be used by other organizations to access your [place] medical records  [id number]        Your Vitals Were     Pulse Temperature Height Pulse Oximetry BMI (Body Mass Index)       63 98.2  F (36.8  C) (Oral) 5' 10\" (1.778 m) 96% 31.38 kg/m2        Blood Pressure " from Last 3 Encounters:   09/19/17 124/80   09/18/17 122/76   09/16/17 139/75    Weight from Last 3 Encounters:   09/19/17 218 lb 11.2 oz (99.2 kg)   09/18/17 216 lb (98 kg)   09/16/17 222 lb (100.7 kg)              We Performed the Following     EKG 12-lead complete w/read - Clinics        Primary Care Provider Office Phone # Fax #    Jose D Vasquez [phone]       [address] [address]        Equal Access to Services     [facility]: Hadii aad ku hadasho Soomaali, waaxda luqadaha, qaybta kaalmada adeegyada, wei gutierrez hayisaura harrison . So [facility] [phone].    ATENCIÓN: Si habla español, tiene a bautista disposición servicios gratuitos de asistencia lingüística. [facility] [phone].    We comply with applicable federal civil rights laws and [place] laws. We do not discriminate on the basis of race, color, national origin, age, disability sex, sexual orientation or gender identity.            Thank you!     Thank you for choosing [facility]  for your care. Our goal is always to provide you with excellent care. Hearing back from our patients is one way we can continue to improve our services. Please take a few minutes to complete the written survey that you may receive in the mail after your visit with us. Thank you!             Your Updated Medication List - Protect others around you: Learn how to safely use, store and throw away your medicines at www.disposemymeds.org.          This list is accurate as of: 9/19/17  1:35 PM.  Always use your most recent med list.                   Brand Name Dispense Instructions for use Diagnosis    ALEVE 220 MG tablet   Generic drug:  naproxen sodium      Take by mouth as needed for moderate pain Reported on 3/22/2017        amLODIPine 5 MG tablet    NORVASC    180 tablet    Take 0.5 tablets (2.5 mg) by mouth daily    Essential hypertension, benign       aspirin 81 MG tablet      Take 1 tablet by mouth  daily.        cetirizine 10 MG tablet    zyrTEC     Take 10 mg by mouth        clotrimazole 1 % cream    LOTRIMIN    30 g    Apply topically 2 times daily    Tinea cruris       docusate calcium 240 MG capsule    SURFAK          folic acid 1 MG tablet    FOLVITE     Take 1 mg by mouth daily        Menthol (Topical Analgesic) 2 % Gel           methotrexate 2.5 MG tablet CHEMO      Take 25 mg by mouth once a week On Tuesdays        oxyCODONE 5 MG IR tablet    ROXICODONE    12 tablet    Take 1 tablet (5 mg) by mouth every 6 hours as needed for pain        PRESERVISION AREDS PO      Take 1 tablet by mouth every morning        REMICADE IV      Infusion every 6 weeks        simvastatin 20 MG tablet    ZOCOR    90 tablet    Take 1 tablet (20 mg) by mouth At Bedtime    Mixed hyperlipidemia       triamterene-hydrochlorothiazide 37.5-25 MG per tablet    MAXZIDE-25    180 tablet    Take 2 tablets by mouth daily    Essential hypertension, benign, Edema, unspecified type

## 2017-09-19 NOTE — PROGRESS NOTES
[place] [address]  [facility] [address]  [phone]  Dept: [phone]    PRE-OP EVALUATION:  Today's date: 2017    Raul Younger (: 1935) presents for pre-operative evaluation assessment as requested by Dr. Robbins.  He requires evaluation and anesthesia risk assessment prior to undergoing surgery/procedure for treatment of umbilical hernia .  Proposed procedure: Herniorrhaphy umbilical    Date of Surgery/ Procedure: 17  Time of Surgery/ Procedure: 12:50 PM  Hospital/Surgical Facility: [facility]    Primary Physician: Jose D Vasquez  Type of Anesthesia Anticipated: General    Patient has a Health Care Directive or Living Will:  YES     Preop Questions 2017   1.  Do you have a history of heart attack, stroke, stent, bypass or surgery on an artery in the head, neck, heart or legs? No   2.  Do you ever have any pain or discomfort in your chest? No   3.  Do you have a history of  Heart Failure? No   4.   Are you troubled by shortness of breath when:  walking on a level surface, or up a slight hill, or at night? No   5.  Do you currently have a cold, bronchitis or other respiratory infection? No   6.  Do you have a cough, shortness of breath, or wheezing? No   7.  Do you sometimes get pains in the calves of your legs when you walk? No   8. Do you or anyone in your family have previous history of blood clots? No   9.  Do you or does anyone in your family have a serious bleeding problem such as prolonged bleeding following surgeries or cuts? No   10. Have you ever had problems with anemia or been told to take iron pills? YES -    11. Have you had any abnormal blood loss such as black, tarry or bloody stools? No   12. Have you ever had a blood transfusion? No   13. Have you or any of your relatives ever had problems with anesthesia? No   14. Do you have sleep apnea, excessive snoring or daytime drowsiness? YES - uses CPAP   15. Do you have any prosthetic heart  "valves? No   16. Do you have prosthetic joints? No           HPI:                                                      Brief HPI related to upcoming procedure:     He has a painful, swollen umbilical hernia.      No h/o Anesthesia complications.    MEDICAL HISTORY:                                                    Patient Active Problem List    Diagnosis Date Noted     RA (rheumatoid arthritis) (H) 02/19/2015     Priority: Medium     2014. Treat with Pulse doses of Prednisone by Rheumatologist for \"exacerbations/flare-ups\"       History of colonic polyps 02/19/2015     Priority: Medium     Diagnosed 12/2011 due to for colonoscopy 2016  SPECIMEN(S):  Colon polyp ascending    FINAL DIAGNOSIS:  Ascending colon polyp, biopsy - Small fragment of mucosa without  diagnostic evidence of hyperplastic or adenomatous polyp. Negative for  dysplasia and malignancy.  Problem list name updated by automated process. Provider to review       Advanced directives, counseling/discussion 12/08/2011     Priority: Medium     Pt already has a HCD and will bring in a copy.     Shauna Mosquera MA         HYPERLIPIDEMIA LDL GOAL <130 10/31/2010     Priority: Medium     Prostate cancer (H) 01/26/2010     Priority: Medium     Cryosurgery  January 2011       Chronic rhinitis 06/02/2008     Priority: Medium     (Problem list name updated by automated process. Provider to review and confirm.)       Essential hypertension, benign 02/06/2003     Priority: Medium      Past Medical History:   Diagnosis Date     Arthritis      HTN      Malignant neoplasm (H) 01/2011    Prostate cancer     MI, old     vs. asthma per pt.'s wife     Migraine, unspecified, without mention of intractable migraine without mention of status migrainosus     Abstracted 5/15/02.     Other and unspecified hyperlipidemia     Abstracted 5/15/02.     Other and unspecified hyperlipidemia      Sleep apnea      Past Surgical History:   Procedure Laterality Date     ABDOMEN SURGERY  " 1980    diaphram repair     C NONSPECIFIC PROCEDURE  1996    Paraesophageal Hernia Repair. Abstracted 5/15/02.     C NONSPECIFIC PROCEDURE  1943    Tonsillectomy. Abstracted 5/15/02.     COLONOSCOPY  12/29/2011    Diverticuli, 3 mm tissue biopsied, path showed no hyperplastic or adenomatous elements     CYSTOSCOPY       ENT SURGERY       GENITOURINARY SURGERY       HEAD & NECK SURGERY       IRRIGATION AND DEBRIDEMENT HIP, COMBINED Left 11/7/2015    Procedure: COMBINED IRRIGATION AND DEBRIDEMENT HIP;  Surgeon: Stanislav Maharaj MD;  Location: [facility] OR     ORTHOPEDIC SURGERY       PHACOEMULSIFICATION CLEAR CORNEA WITH TORIC INTRAOCULAR LENS IMPLANT  5/23/2012    Procedure:PHACOEMULSIFICATION CLEAR CORNEA WITH TORIC INTRAOCULAR LENS IMPLANT; RIGHT PHACOEMULSIFICATION CLEAR CORNEA WITH  DELUXE TORIC INTRAOCULAR LENS IMPLANT; Surgeon:ANDRA LAGUNAS; Location:[facility]     PHACOEMULSIFICATION CLEAR CORNEA WITH TORIC INTRAOCULAR LENS IMPLANT  5/30/2012    Procedure:PHACOEMULSIFICATION CLEAR CORNEA WITH TORIC INTRAOCULAR LENS IMPLANT; LEFT PHACOEMULSIFICATION CLEAR CORNEA WITH TORIC INTRAOCULAR LENS IMPLANT ; Surgeon:ANDRA LAGUNAS; Location:[facility]     PROSTATE SURGERY  1/2010     VITRECTOMY PARSPLANA WITH 23 GAUGE SYSTEM  1/11/2013    Procedure: VITRECTOMY PARSPLANA WITH 23 GAUGE SYSTEM;  LEFT 23 GAUGE VITRECTOMY, EPIRETINAL MEMBRANE REMOVAL, AIR FLUID EXCHANGE ;  Surgeon: Lawson Celeste MD;  Location: [facility]     VITRECTOMY PARSPLANA WITH 23 GAUGE SYSTEM  2/4/2014    Procedure: VITRECTOMY PARSPLANA WITH 23 GAUGE SYSTEM;  RIGHT VITRECTOMY PARSPLANA WITH 23 GAUGE SYSTEM, EPIRETINAL MEMBRANE REMOVAL, AIR/FLUID EXCHANGE ;  Surgeon: Lawson Celeste MD;  Location: [facility]     Current Outpatient Prescriptions   Medication Sig Dispense Refill     oxyCODONE (ROXICODONE) 5 MG IR tablet Take 1 tablet (5 mg) by mouth every 6 hours as needed for pain (Patient not taking: Reported on 9/18/2017) 12 tablet 0      triamterene-hydrochlorothiazide (MAXZIDE-25) 37.5-25 MG per tablet Take 2 tablets by mouth daily 180 tablet 1     amLODIPine (NORVASC) 5 MG tablet Take 0.5 tablets (2.5 mg) by mouth daily 180 tablet 4     clotrimazole (LOTRIMIN) 1 % cream Apply topically 2 times daily 30 g 1     Menthol, Topical Analgesic, 2 % GEL        docusate calcium (SURFAK) 240 MG capsule        naproxen sodium (ALEVE) 220 MG tablet Take by mouth as needed for moderate pain Reported on 3/22/2017       simvastatin (ZOCOR) 20 MG tablet Take 1 tablet (20 mg) by mouth At Bedtime (Patient not taking: Reported on 9/18/2017) 90 tablet 4     InFLIXimab (REMICADE IV) Infusion every 6 weeks       acetaminophen (TYLENOL) 500 MG tablet Take 1,000 mg by mouth every 6 hours as needed for pain       methotrexate 2.5 MG tablet Take 25 mg by mouth once a week On Tuesdays       folic acid (FOLVITE) 1 MG tablet Take 1 mg by mouth daily       fexofenadine (ALLEGRA) 180 MG tablet Take 180 mg by mouth daily Reported on 3/22/2017       Multiple Vitamins-Minerals (PRESERVISION AREDS PO) Take 1 tablet by mouth every morning        aspirin 81 MG tablet Take 1 tablet by mouth daily.       OTC products: Aspirin    Allergies   Allergen Reactions     Latex      Area turns red and peels off     Nifedipine      Low grade headache     Procardia [Nifedipine] Other (See Comments)     headache      Latex Allergy: YES: Precautions to take: avoid    Social History   Substance Use Topics     Smoking status: Former Smoker     Packs/day: 1.00     Years: 10.00     Types: Cigarettes, Pipe     Quit date: 1/1/1972     Smokeless tobacco: Never Used     Alcohol use No      Comment: occ wine     History   Drug Use No       REVIEW OF SYSTEMS:                                                    Constitutional, neuro, ENT, endocrine, pulmonary, cardiac, gastrointestinal, genitourinary, musculoskeletal, integument and psychiatric systems are negative, except as otherwise noted.      EXAM:         "                                            /80 (BP Location: Right arm, Patient Position: Sitting, Cuff Size: Adult Large)  Pulse 63  Temp 98.2  F (36.8  C) (Oral)  Ht 5' 10\" (1.778 m)  Wt 218 lb 11.2 oz (99.2 kg)  SpO2 96%  BMI 31.38 kg/m2    GENERAL APPEARANCE: healthy, alert and no distress     EYES: EOMI,  PERRL     HENT:  mouth without ulcers or lesions     NECK: no adenopathy, no asymmetry, masses, or scars and thyroid normal to palpation     RESP: lungs clear to auscultation -     CV: regular rates and rhythm,  no murmur, click or rub     ABDOMEN: protruding, thickened 4 cm umbilical hernia     MS: extremities normal- no gross deformities noted, no edema     SKIN: no suspicious lesions or rashes     NEURO: Normal strength and tone     PSYCH: mentation appears normal. and affect normal/bright    DIAGNOSTICS:                                                        EKG: RSR, rate 57, axis -26, NS T wave changes. Tracing is similar to previous of 11-6-2015. No acute changes. No ectopy.        Recent Labs   Lab Test  09/16/17 2023 03/22/17   0958   HGB  12.3*  12.6*   PLT  243  222   NA  136  142   POTASSIUM  3.5  3.7   CR  0.88  0.89        IMPRESSION:                                                    Diagnosis/reason for consult:     (Z01.818) Preop general physical exam  (primary encounter diagnosis)  Comment: satis  Plan: EKG 12-lead complete w/read - Clinics            (K42.9) Umbilical hernia without obstruction and without gangrene  Comment:   Plan:     (M06.9) Rheumatoid arthritis, involving unspecified site, unspecified rheumatoid factor presence (H)  Comment:   Plan:     (I10) Essential hypertension, benign  Comment:   Plan:         The proposed surgical procedure is considered INTERMEDIATE risk.    REVISED CARDIAC RISK INDEX  The patient has the following serious cardiovascular risks for perioperative complications such as (MI, PE, VFib and 3  AV Block):  No serious cardiac " risks  INTERPRETATION: 2 risks: Class III (moderate risk - 6.6% complication rate)    The patient has the following additional risks for perioperative complications:  Immunosuppressives.      ICD-10-CM    1. Preop general physical exam Z01.818 EKG 12-lead complete w/read - Clinics   2. Umbilical hernia without obstruction and without gangrene K42.9    3. Rheumatoid arthritis, involving unspecified site, unspecified rheumatoid factor presence (H) M06.9    4. Essential hypertension, benign I10        RECOMMENDATIONS:                                                      --Consult hospital rounder / IM to assist post-op medical management    --Patient is to take all scheduled medications on the day of surgery EXCEPT for modifications listed below.  Methotrexate.    APPROVAL GIVEN to proceed with proposed procedure, without further diagnostic evaluation       Signed Electronically by: Clay Geller MD    Copy of this evaluation report is provided to requesting physician.    [facility] Preop Guidelines

## 2017-09-20 RX ORDER — CLOTRIMAZOLE 1 %
CREAM (GRAM) TOPICAL 2 TIMES DAILY PRN
COMMUNITY
End: 2021-09-10

## 2017-09-20 NOTE — INTERVAL H&P NOTE
This note is for the purpose of making the H & P performed in the clinic within the last 30 days available in the hospital surgical encounter.

## 2017-09-20 NOTE — H&P (VIEW-ONLY)
[place] [address]  [facility] [address]  [phone]  Dept: [phone]    PRE-OP EVALUATION:  Today's date: 2017    Raul Younger (: 1935) presents for pre-operative evaluation assessment as requested by Dr. Robbins.  He requires evaluation and anesthesia risk assessment prior to undergoing surgery/procedure for treatment of umbilical hernia .  Proposed procedure: Herniorrhaphy umbilical    Date of Surgery/ Procedure: 17  Time of Surgery/ Procedure: 12:50 PM  Hospital/Surgical Facility: [facility]    Primary Physician: Jose D Vasquez  Type of Anesthesia Anticipated: General    Patient has a Health Care Directive or Living Will:  YES     Preop Questions 2017   1.  Do you have a history of heart attack, stroke, stent, bypass or surgery on an artery in the head, neck, heart or legs? No   2.  Do you ever have any pain or discomfort in your chest? No   3.  Do you have a history of  Heart Failure? No   4.   Are you troubled by shortness of breath when:  walking on a level surface, or up a slight hill, or at night? No   5.  Do you currently have a cold, bronchitis or other respiratory infection? No   6.  Do you have a cough, shortness of breath, or wheezing? No   7.  Do you sometimes get pains in the calves of your legs when you walk? No   8. Do you or anyone in your family have previous history of blood clots? No   9.  Do you or does anyone in your family have a serious bleeding problem such as prolonged bleeding following surgeries or cuts? No   10. Have you ever had problems with anemia or been told to take iron pills? YES -    11. Have you had any abnormal blood loss such as black, tarry or bloody stools? No   12. Have you ever had a blood transfusion? No   13. Have you or any of your relatives ever had problems with anesthesia? No   14. Do you have sleep apnea, excessive snoring or daytime drowsiness? YES - uses CPAP   15. Do you have any prosthetic heart  "valves? No   16. Do you have prosthetic joints? No           HPI:                                                      Brief HPI related to upcoming procedure:     He has a painful, swollen umbilical hernia.      No h/o Anesthesia complications.    MEDICAL HISTORY:                                                    Patient Active Problem List    Diagnosis Date Noted     RA (rheumatoid arthritis) (H) 02/19/2015     Priority: Medium     2014. Treat with Pulse doses of Prednisone by Rheumatologist for \"exacerbations/flare-ups\"       History of colonic polyps 02/19/2015     Priority: Medium     Diagnosed 12/2011 due to for colonoscopy 2016  SPECIMEN(S):  Colon polyp ascending    FINAL DIAGNOSIS:  Ascending colon polyp, biopsy - Small fragment of mucosa without  diagnostic evidence of hyperplastic or adenomatous polyp. Negative for  dysplasia and malignancy.  Problem list name updated by automated process. Provider to review       Advanced directives, counseling/discussion 12/08/2011     Priority: Medium     Pt already has a HCD and will bring in a copy.     Shauna Mosquera MA         HYPERLIPIDEMIA LDL GOAL <130 10/31/2010     Priority: Medium     Prostate cancer (H) 01/26/2010     Priority: Medium     Cryosurgery  January 2011       Chronic rhinitis 06/02/2008     Priority: Medium     (Problem list name updated by automated process. Provider to review and confirm.)       Essential hypertension, benign 02/06/2003     Priority: Medium      Past Medical History:   Diagnosis Date     Arthritis      HTN      Malignant neoplasm (H) 01/2011    Prostate cancer     MI, old     vs. asthma per pt.'s wife     Migraine, unspecified, without mention of intractable migraine without mention of status migrainosus     Abstracted 5/15/02.     Other and unspecified hyperlipidemia     Abstracted 5/15/02.     Other and unspecified hyperlipidemia      Sleep apnea      Past Surgical History:   Procedure Laterality Date     ABDOMEN SURGERY  " 1980    diaphram repair     C NONSPECIFIC PROCEDURE  1996    Paraesophageal Hernia Repair. Abstracted 5/15/02.     C NONSPECIFIC PROCEDURE  1943    Tonsillectomy. Abstracted 5/15/02.     COLONOSCOPY  12/29/2011    Diverticuli, 3 mm tissue biopsied, path showed no hyperplastic or adenomatous elements     CYSTOSCOPY       ENT SURGERY       GENITOURINARY SURGERY       HEAD & NECK SURGERY       IRRIGATION AND DEBRIDEMENT HIP, COMBINED Left 11/7/2015    Procedure: COMBINED IRRIGATION AND DEBRIDEMENT HIP;  Surgeon: Stanislav Maharaj MD;  Location: [facility] OR     ORTHOPEDIC SURGERY       PHACOEMULSIFICATION CLEAR CORNEA WITH TORIC INTRAOCULAR LENS IMPLANT  5/23/2012    Procedure:PHACOEMULSIFICATION CLEAR CORNEA WITH TORIC INTRAOCULAR LENS IMPLANT; RIGHT PHACOEMULSIFICATION CLEAR CORNEA WITH  DELUXE TORIC INTRAOCULAR LENS IMPLANT; Surgeon:ANDRA LAGUNAS; Location:[facility]     PHACOEMULSIFICATION CLEAR CORNEA WITH TORIC INTRAOCULAR LENS IMPLANT  5/30/2012    Procedure:PHACOEMULSIFICATION CLEAR CORNEA WITH TORIC INTRAOCULAR LENS IMPLANT; LEFT PHACOEMULSIFICATION CLEAR CORNEA WITH TORIC INTRAOCULAR LENS IMPLANT ; Surgeon:ANDRA LAGUNAS; Location:[facility]     PROSTATE SURGERY  1/2010     VITRECTOMY PARSPLANA WITH 23 GAUGE SYSTEM  1/11/2013    Procedure: VITRECTOMY PARSPLANA WITH 23 GAUGE SYSTEM;  LEFT 23 GAUGE VITRECTOMY, EPIRETINAL MEMBRANE REMOVAL, AIR FLUID EXCHANGE ;  Surgeon: Lawson Celeste MD;  Location: [facility]     VITRECTOMY PARSPLANA WITH 23 GAUGE SYSTEM  2/4/2014    Procedure: VITRECTOMY PARSPLANA WITH 23 GAUGE SYSTEM;  RIGHT VITRECTOMY PARSPLANA WITH 23 GAUGE SYSTEM, EPIRETINAL MEMBRANE REMOVAL, AIR/FLUID EXCHANGE ;  Surgeon: Lawson Celeste MD;  Location: [facility]     Current Outpatient Prescriptions   Medication Sig Dispense Refill     oxyCODONE (ROXICODONE) 5 MG IR tablet Take 1 tablet (5 mg) by mouth every 6 hours as needed for pain (Patient not taking: Reported on 9/18/2017) 12 tablet 0      triamterene-hydrochlorothiazide (MAXZIDE-25) 37.5-25 MG per tablet Take 2 tablets by mouth daily 180 tablet 1     amLODIPine (NORVASC) 5 MG tablet Take 0.5 tablets (2.5 mg) by mouth daily 180 tablet 4     clotrimazole (LOTRIMIN) 1 % cream Apply topically 2 times daily 30 g 1     Menthol, Topical Analgesic, 2 % GEL        docusate calcium (SURFAK) 240 MG capsule        naproxen sodium (ALEVE) 220 MG tablet Take by mouth as needed for moderate pain Reported on 3/22/2017       simvastatin (ZOCOR) 20 MG tablet Take 1 tablet (20 mg) by mouth At Bedtime (Patient not taking: Reported on 9/18/2017) 90 tablet 4     InFLIXimab (REMICADE IV) Infusion every 6 weeks       acetaminophen (TYLENOL) 500 MG tablet Take 1,000 mg by mouth every 6 hours as needed for pain       methotrexate 2.5 MG tablet Take 25 mg by mouth once a week On Tuesdays       folic acid (FOLVITE) 1 MG tablet Take 1 mg by mouth daily       fexofenadine (ALLEGRA) 180 MG tablet Take 180 mg by mouth daily Reported on 3/22/2017       Multiple Vitamins-Minerals (PRESERVISION AREDS PO) Take 1 tablet by mouth every morning        aspirin 81 MG tablet Take 1 tablet by mouth daily.       OTC products: Aspirin    Allergies   Allergen Reactions     Latex      Area turns red and peels off     Nifedipine      Low grade headache     Procardia [Nifedipine] Other (See Comments)     headache      Latex Allergy: YES: Precautions to take: avoid    Social History   Substance Use Topics     Smoking status: Former Smoker     Packs/day: 1.00     Years: 10.00     Types: Cigarettes, Pipe     Quit date: 1/1/1972     Smokeless tobacco: Never Used     Alcohol use No      Comment: occ wine     History   Drug Use No       REVIEW OF SYSTEMS:                                                    Constitutional, neuro, ENT, endocrine, pulmonary, cardiac, gastrointestinal, genitourinary, musculoskeletal, integument and psychiatric systems are negative, except as otherwise noted.      EXAM:         "                                            /80 (BP Location: Right arm, Patient Position: Sitting, Cuff Size: Adult Large)  Pulse 63  Temp 98.2  F (36.8  C) (Oral)  Ht 5' 10\" (1.778 m)  Wt 218 lb 11.2 oz (99.2 kg)  SpO2 96%  BMI 31.38 kg/m2    GENERAL APPEARANCE: healthy, alert and no distress     EYES: EOMI,  PERRL     HENT:  mouth without ulcers or lesions     NECK: no adenopathy, no asymmetry, masses, or scars and thyroid normal to palpation     RESP: lungs clear to auscultation -     CV: regular rates and rhythm,  no murmur, click or rub     ABDOMEN: protruding, thickened 4 cm umbilical hernia     MS: extremities normal- no gross deformities noted, no edema     SKIN: no suspicious lesions or rashes     NEURO: Normal strength and tone     PSYCH: mentation appears normal. and affect normal/bright    DIAGNOSTICS:                                                        EKG: RSR, rate 57, axis -26, NS T wave changes. Tracing is similar to previous of 11-6-2015. No acute changes. No ectopy.        Recent Labs   Lab Test  09/16/17 2023 03/22/17   0958   HGB  12.3*  12.6*   PLT  243  222   NA  136  142   POTASSIUM  3.5  3.7   CR  0.88  0.89        IMPRESSION:                                                    Diagnosis/reason for consult:     (Z01.818) Preop general physical exam  (primary encounter diagnosis)  Comment: satis  Plan: EKG 12-lead complete w/read - Clinics            (K42.9) Umbilical hernia without obstruction and without gangrene  Comment:   Plan:     (M06.9) Rheumatoid arthritis, involving unspecified site, unspecified rheumatoid factor presence (H)  Comment:   Plan:     (I10) Essential hypertension, benign  Comment:   Plan:         The proposed surgical procedure is considered INTERMEDIATE risk.    REVISED CARDIAC RISK INDEX  The patient has the following serious cardiovascular risks for perioperative complications such as (MI, PE, VFib and 3  AV Block):  No serious cardiac " risks  INTERPRETATION: 2 risks: Class III (moderate risk - 6.6% complication rate)    The patient has the following additional risks for perioperative complications:  Immunosuppressives.      ICD-10-CM    1. Preop general physical exam Z01.818 EKG 12-lead complete w/read - Clinics   2. Umbilical hernia without obstruction and without gangrene K42.9    3. Rheumatoid arthritis, involving unspecified site, unspecified rheumatoid factor presence (H) M06.9    4. Essential hypertension, benign I10        RECOMMENDATIONS:                                                      --Consult hospital rounder / IM to assist post-op medical management    --Patient is to take all scheduled medications on the day of surgery EXCEPT for modifications listed below.  Methotrexate.    APPROVAL GIVEN to proceed with proposed procedure, without further diagnostic evaluation       Signed Electronically by: Clay Geller MD    Copy of this evaluation report is provided to requesting physician.    [facility] Preop Guidelines

## 2017-09-21 ENCOUNTER — ANESTHESIA (OUTPATIENT)
Dept: SURGERY | Facility: CLINIC | Age: 82
End: 2017-09-21
Payer: COMMERCIAL

## 2017-09-21 ENCOUNTER — HOSPITAL ENCOUNTER (OUTPATIENT)
Facility: CLINIC | Age: 82
Discharge: HOME | End: 2017-09-21
Attending: SURGERY | Admitting: SURGERY
Payer: COMMERCIAL

## 2017-09-21 ENCOUNTER — APPOINTMENT (OUTPATIENT)
Dept: SURGERY | Facility: PHYSICIAN GROUP | Age: 82
End: 2017-09-21
Payer: COMMERCIAL

## 2017-09-21 ENCOUNTER — SURGERY (OUTPATIENT)
Age: 82
End: 2017-09-21

## 2017-09-21 ENCOUNTER — ANESTHESIA EVENT (OUTPATIENT)
Dept: SURGERY | Facility: CLINIC | Age: 82
End: 2017-09-21
Payer: COMMERCIAL

## 2017-09-21 VITALS
RESPIRATION RATE: 12 BRPM | HEIGHT: 69 IN | TEMPERATURE: 97.4 F | WEIGHT: 215.4 LBS | SYSTOLIC BLOOD PRESSURE: 134 MMHG | OXYGEN SATURATION: 97 % | DIASTOLIC BLOOD PRESSURE: 72 MMHG | BODY MASS INDEX: 31.9 KG/M2

## 2017-09-21 DIAGNOSIS — K43.9 VENTRAL HERNIA WITHOUT OBSTRUCTION OR GANGRENE: Primary | ICD-10-CM

## 2017-09-21 PROCEDURE — 71000013 ZZH RECOVERY PHASE 1 LEVEL 1 EA ADDTL HR: Performed by: SURGERY

## 2017-09-21 PROCEDURE — 71000012 ZZH RECOVERY PHASE 1 LEVEL 1 FIRST HR: Performed by: SURGERY

## 2017-09-21 PROCEDURE — 25000566 ZZH SEVOFLURANE, EA 15 MIN: Performed by: SURGERY

## 2017-09-21 PROCEDURE — 49587 ZZHC REPAIR UMBILICAL HERN,5+Y/O, INCARCERATED OR STRANGULATED: CPT | Mod: AS | Performed by: PHYSICIAN ASSISTANT

## 2017-09-21 PROCEDURE — 37000009 ZZH ANESTHESIA TECHNICAL FEE, EACH ADDTL 15 MIN: Performed by: SURGERY

## 2017-09-21 PROCEDURE — C1781 MESH (IMPLANTABLE): HCPCS | Performed by: SURGERY

## 2017-09-21 PROCEDURE — 25000125 ZZHC RX 250: Performed by: SURGERY

## 2017-09-21 PROCEDURE — 88302 TISSUE EXAM BY PATHOLOGIST: CPT | Performed by: SURGERY

## 2017-09-21 PROCEDURE — 25000125 ZZHC RX 250: Performed by: NURSE ANESTHETIST, CERTIFIED REGISTERED

## 2017-09-21 PROCEDURE — 71000027 ZZH RECOVERY PHASE 2 EACH 15 MINS: Performed by: SURGERY

## 2017-09-21 PROCEDURE — 36000050 ZZH SURGERY LEVEL 2 1ST 30 MIN: Performed by: SURGERY

## 2017-09-21 PROCEDURE — 25000128 H RX IP 250 OP 636: Performed by: ANESTHESIOLOGY

## 2017-09-21 PROCEDURE — 49587 ZZHC REPAIR UMBILICAL HERN,5+Y/O, INCARCERATED OR STRANGULATED: CPT | Performed by: SURGERY

## 2017-09-21 PROCEDURE — 37000008 ZZH ANESTHESIA TECHNICAL FEE, 1ST 30 MIN: Performed by: SURGERY

## 2017-09-21 PROCEDURE — 36000052 ZZH SURGERY LEVEL 2 EA 15 ADDTL MIN: Performed by: SURGERY

## 2017-09-21 PROCEDURE — 25000128 H RX IP 250 OP 636: Performed by: NURSE ANESTHETIST, CERTIFIED REGISTERED

## 2017-09-21 PROCEDURE — S0020 INJECTION, BUPIVICAINE HYDRO: HCPCS | Performed by: SURGERY

## 2017-09-21 PROCEDURE — 27210794 ZZH OR GENERAL SUPPLY STERILE: Performed by: SURGERY

## 2017-09-21 PROCEDURE — 40000306 ZZH STATISTIC PRE PROC ASSESS II: Performed by: SURGERY

## 2017-09-21 PROCEDURE — 88302 TISSUE EXAM BY PATHOLOGIST: CPT | Mod: 26 | Performed by: SURGERY

## 2017-09-21 PROCEDURE — 25000128 H RX IP 250 OP 636: Performed by: SURGERY

## 2017-09-21 PROCEDURE — 25000132 ZZH RX MED GY IP 250 OP 250 PS 637: Performed by: SURGERY

## 2017-09-21 DEVICE — MESH VENTRALEX HERNIA 2.5" CIRCLE MED W/STRAP 5950008: Type: IMPLANTABLE DEVICE | Site: UMBILICAL | Status: FUNCTIONAL

## 2017-09-21 RX ORDER — FENTANYL CITRATE 50 UG/ML
INJECTION, SOLUTION INTRAMUSCULAR; INTRAVENOUS PRN
Status: DISCONTINUED | OUTPATIENT
Start: 2017-09-21 | End: 2017-09-21

## 2017-09-21 RX ORDER — IBUPROFEN 600 MG/1
600 TABLET, FILM COATED ORAL
Status: COMPLETED | OUTPATIENT
Start: 2017-09-21 | End: 2017-09-21

## 2017-09-21 RX ORDER — ACETAMINOPHEN 325 MG/1
650 TABLET ORAL EVERY 4 HOURS PRN
Qty: 100 TABLET | Refills: 0 | COMMUNITY
Start: 2017-09-21

## 2017-09-21 RX ORDER — MEPERIDINE HYDROCHLORIDE 25 MG/ML
12.5 INJECTION INTRAMUSCULAR; INTRAVENOUS; SUBCUTANEOUS
Status: DISCONTINUED | OUTPATIENT
Start: 2017-09-21 | End: 2017-09-21 | Stop reason: HOSPADM

## 2017-09-21 RX ORDER — FENTANYL CITRATE 50 UG/ML
25-50 INJECTION, SOLUTION INTRAMUSCULAR; INTRAVENOUS
Status: DISCONTINUED | OUTPATIENT
Start: 2017-09-21 | End: 2017-09-21 | Stop reason: HOSPADM

## 2017-09-21 RX ORDER — ONDANSETRON 2 MG/ML
4 INJECTION INTRAMUSCULAR; INTRAVENOUS EVERY 30 MIN PRN
Status: DISCONTINUED | OUTPATIENT
Start: 2017-09-21 | End: 2017-09-21 | Stop reason: HOSPADM

## 2017-09-21 RX ORDER — LIDOCAINE 40 MG/G
CREAM TOPICAL
Status: DISCONTINUED | OUTPATIENT
Start: 2017-09-21 | End: 2017-09-21 | Stop reason: HOSPADM

## 2017-09-21 RX ORDER — GLYCOPYRROLATE 0.2 MG/ML
INJECTION, SOLUTION INTRAMUSCULAR; INTRAVENOUS PRN
Status: DISCONTINUED | OUTPATIENT
Start: 2017-09-21 | End: 2017-09-21

## 2017-09-21 RX ORDER — BUPIVACAINE HYDROCHLORIDE 2.5 MG/ML
INJECTION, SOLUTION EPIDURAL; INFILTRATION; INTRACAUDAL PRN
Status: DISCONTINUED | OUTPATIENT
Start: 2017-09-21 | End: 2017-09-21 | Stop reason: HOSPADM

## 2017-09-21 RX ORDER — OXYCODONE HYDROCHLORIDE 5 MG/1
5-10 TABLET ORAL
Status: DISCONTINUED | OUTPATIENT
Start: 2017-09-21 | End: 2017-09-21 | Stop reason: HOSPADM

## 2017-09-21 RX ORDER — NALOXONE HYDROCHLORIDE 0.4 MG/ML
.1-.4 INJECTION, SOLUTION INTRAMUSCULAR; INTRAVENOUS; SUBCUTANEOUS
Status: DISCONTINUED | OUTPATIENT
Start: 2017-09-21 | End: 2017-09-21 | Stop reason: HOSPADM

## 2017-09-21 RX ORDER — PROPOFOL 10 MG/ML
INJECTION, EMULSION INTRAVENOUS PRN
Status: DISCONTINUED | OUTPATIENT
Start: 2017-09-21 | End: 2017-09-21

## 2017-09-21 RX ORDER — ONDANSETRON 4 MG/1
4 TABLET, ORALLY DISINTEGRATING ORAL EVERY 30 MIN PRN
Status: DISCONTINUED | OUTPATIENT
Start: 2017-09-21 | End: 2017-09-21 | Stop reason: HOSPADM

## 2017-09-21 RX ORDER — GLYCINE 1.5 G/100ML
SOLUTION IRRIGATION PRN
Status: DISCONTINUED | OUTPATIENT
Start: 2017-09-21 | End: 2017-09-21 | Stop reason: HOSPADM

## 2017-09-21 RX ORDER — NEOSTIGMINE METHYLSULFATE 1 MG/ML
VIAL (ML) INJECTION PRN
Status: DISCONTINUED | OUTPATIENT
Start: 2017-09-21 | End: 2017-09-21

## 2017-09-21 RX ORDER — CEFAZOLIN SODIUM 2 G/100ML
2 INJECTION, SOLUTION INTRAVENOUS
Status: COMPLETED | OUTPATIENT
Start: 2017-09-21 | End: 2017-09-21

## 2017-09-21 RX ORDER — CEFAZOLIN SODIUM 1 G/3ML
1 INJECTION, POWDER, FOR SOLUTION INTRAMUSCULAR; INTRAVENOUS SEE ADMIN INSTRUCTIONS
Status: DISCONTINUED | OUTPATIENT
Start: 2017-09-21 | End: 2017-09-21 | Stop reason: HOSPADM

## 2017-09-21 RX ORDER — OXYCODONE HYDROCHLORIDE 5 MG/1
5-10 TABLET ORAL
Qty: 20 TABLET | Refills: 0 | Status: SHIPPED | OUTPATIENT
Start: 2017-09-21 | End: 2018-06-08

## 2017-09-21 RX ORDER — SODIUM CHLORIDE, SODIUM LACTATE, POTASSIUM CHLORIDE, CALCIUM CHLORIDE 600; 310; 30; 20 MG/100ML; MG/100ML; MG/100ML; MG/100ML
INJECTION, SOLUTION INTRAVENOUS CONTINUOUS
Status: DISCONTINUED | OUTPATIENT
Start: 2017-09-21 | End: 2017-09-21 | Stop reason: HOSPADM

## 2017-09-21 RX ORDER — HYDROMORPHONE HYDROCHLORIDE 1 MG/ML
.3-.5 INJECTION, SOLUTION INTRAMUSCULAR; INTRAVENOUS; SUBCUTANEOUS EVERY 10 MIN PRN
Status: DISCONTINUED | OUTPATIENT
Start: 2017-09-21 | End: 2017-09-21 | Stop reason: HOSPADM

## 2017-09-21 RX ORDER — LIDOCAINE HYDROCHLORIDE 10 MG/ML
INJECTION, SOLUTION INFILTRATION; PERINEURAL PRN
Status: DISCONTINUED | OUTPATIENT
Start: 2017-09-21 | End: 2017-09-21

## 2017-09-21 RX ORDER — ONDANSETRON 2 MG/ML
INJECTION INTRAMUSCULAR; INTRAVENOUS PRN
Status: DISCONTINUED | OUTPATIENT
Start: 2017-09-21 | End: 2017-09-21

## 2017-09-21 RX ORDER — AMOXICILLIN 250 MG
1-2 CAPSULE ORAL 2 TIMES DAILY
Qty: 30 TABLET | Refills: 0 | Status: SHIPPED | OUTPATIENT
Start: 2017-09-21 | End: 2020-02-13

## 2017-09-21 RX ADMIN — PROPOFOL 200 MG: 10 INJECTION, EMULSION INTRAVENOUS at 13:22

## 2017-09-21 RX ADMIN — ROCURONIUM BROMIDE 40 MG: 10 INJECTION INTRAVENOUS at 13:22

## 2017-09-21 RX ADMIN — FENTANYL CITRATE 50 MCG: 50 INJECTION INTRAMUSCULAR; INTRAVENOUS at 15:11

## 2017-09-21 RX ADMIN — SODIUM CHLORIDE, POTASSIUM CHLORIDE, SODIUM LACTATE AND CALCIUM CHLORIDE: 600; 310; 30; 20 INJECTION, SOLUTION INTRAVENOUS at 13:28

## 2017-09-21 RX ADMIN — FENTANYL CITRATE 50 MCG: 50 INJECTION, SOLUTION INTRAMUSCULAR; INTRAVENOUS at 14:05

## 2017-09-21 RX ADMIN — IBUPROFEN 600 MG: 600 TABLET ORAL at 15:44

## 2017-09-21 RX ADMIN — FENTANYL CITRATE 50 MCG: 50 INJECTION, SOLUTION INTRAMUSCULAR; INTRAVENOUS at 13:48

## 2017-09-21 RX ADMIN — FENTANYL CITRATE 150 MCG: 50 INJECTION, SOLUTION INTRAMUSCULAR; INTRAVENOUS at 13:22

## 2017-09-21 RX ADMIN — BUPIVACAINE HYDROCHLORIDE 30 ML: 2.5 INJECTION, SOLUTION EPIDURAL; INFILTRATION; INTRACAUDAL at 14:14

## 2017-09-21 RX ADMIN — SODIUM CHLORIDE, POTASSIUM CHLORIDE, SODIUM LACTATE AND CALCIUM CHLORIDE: 600; 310; 30; 20 INJECTION, SOLUTION INTRAVENOUS at 13:19

## 2017-09-21 RX ADMIN — Medication 3 MG: at 14:19

## 2017-09-21 RX ADMIN — GLYCOPYRROLATE 0.2 MG: 0.2 INJECTION, SOLUTION INTRAMUSCULAR; INTRAVENOUS at 13:22

## 2017-09-21 RX ADMIN — GLYCOPYRROLATE 0.4 MG: 0.2 INJECTION, SOLUTION INTRAMUSCULAR; INTRAVENOUS at 14:19

## 2017-09-21 RX ADMIN — CEFAZOLIN SODIUM 2 G: 2 INJECTION, SOLUTION INTRAVENOUS at 13:19

## 2017-09-21 RX ADMIN — HYDROMORPHONE HYDROCHLORIDE 0.5 MG: 1 INJECTION, SOLUTION INTRAMUSCULAR; INTRAVENOUS; SUBCUTANEOUS at 14:52

## 2017-09-21 RX ADMIN — ONDANSETRON 4 MG: 2 INJECTION INTRAMUSCULAR; INTRAVENOUS at 14:15

## 2017-09-21 RX ADMIN — LIDOCAINE HYDROCHLORIDE 30 MG: 10 INJECTION, SOLUTION INFILTRATION; PERINEURAL at 13:22

## 2017-09-21 RX ADMIN — GLYCINE 450 ML: 1.5 SOLUTION IRRIGATION at 14:12

## 2017-09-21 NOTE — PROGRESS NOTES
[facility] Progress Note  [facility] Pre surgery    Had pre surgery visit with pt. Provided spiritual support, prayer. Ongoing  support desired.    Opal Kimball   Intern  [phone]

## 2017-09-21 NOTE — DISCHARGE INSTRUCTIONS
"HOME CARE FOLLOWING UMBILICAL/VENTRAL HERNIA REPAIR  MIGUEL Washburn E. Gavin, MIGUEL Torres, MAR De La Garza    DIET:  No restrictions.  Increased fluid intake is recommended. While taking pain medications, increase dietary fiber or add a fiber supplementation like Metamucil or Citrucel to help prevent constipation - a possible side effect of pain medications.    NAUSEA:  If nauseated from the anesthetic/pain meds; rest in bed, get up cautiously with assistance, and drink clear liquids (juice, tea, broth).    ACTIVITY:  Light Activity -- you may immediately be up and about as tolerated.  Driving -- you may drive when comfortable and off narcotic pain medications.  Light Work -- resume when comfortable off pain medications.  (If you can drive, you probably can work.)  Strenuous Work/Activity -- limit lifting to 20 pounds for 3 weeks.  Active Sports (running, biking, etc.) -- cautiously resume after 4 weeks.    INCISIONAL CARE:    If you have a dressing in place, keep clean and dry for 48 hours after surgery.  After this timeframe, you may replace the gauze daily if it becomes soiled.    You may remove the dressing and shower 48 hours after surgery.  Do not submerse incision in water for 1 week.    If you have a Dermabond dressing (a type of skin glue), you may shower immediately.    Sutures will absorb and need not be removed.    If present, leave the steri-strips (white paper tapes) in place for 14 days after surgery.    If present, leave Dermabond glue in place until it wears/flakes off.    Expect a variable amount of swelling/bruising/discoloration that may appear around or below the repair site.    Some numbness around the incision is common.    A lump/\"healing ridge\" under the incision is normal and will gradually resolve over the following 1-2 months.    DISCOMFORT:  Local anesthetic placed at surgery should provide relief for 4-8 hours.  Begin taking pain pills before " discomfort is severe.  Take the pain medication with some food, when possible, to minimize side effects.  Intermittent use of ice packs to the hernia repair site may help during the first 1-3 weeks after surgery.  Expect gradual improvement.    Over-the-counter anti-inflammatory medications (i.e. Ibuprofen/Advil/Motrin or Naprosyn/Aleve) may be used per package instructions in addition to or while tapering off the narcotic pain medications to decrease swelling and sensitivity at the repair site.  DO NOT TAKE these Anti-inflammatory medications if your primary physician has advised against doing so, or if you have acid reflux, ulcer, or bleeding disorder, or take blood-thinner medications.  Call your primary physician or the surgery office if you have medication questions.      RETURN APPOINTMENT:  Schedule a follow-up visit 2-3 weeks post-op.  Office Phone:  [phone]     CONTACT US IF THE FOLLOWING DEVELOPS:   1. A fever that is above 101     2. If there is a large amount of drainage, bleeding, or swelling.   3. Severe pain that is not relieved by your prescription.   4. Drainage that is thick, cloudy, yellow, green or white.   5. Any other questions not answered by  Frequently Asked Questions  sheet.      FREQUENTLY ASKED QUESTIONS:    Q:  How should my incision look?    A:  Normally your incision will appear slightly swollen with light redness directly along the incision itself as it heals.  It may feel like a bump or ridge as the healing/scarring happens, and over time (3-4 months) this bump or ridge feeling should slowly go away.  In general, clear or pink watery drainage can be normal at first as your incision heals, but should decrease over time.    Q:  How do I know if my incision is infected?  A:  Look at your incision for signs of infection, like redness around the incision spreading to surrounding skin, or drainage of cloudy or foul-smelling drainage.  If you feel warm, check your temperature to see if  you are running a fever.    **If any of these things occur, please notify the nurse at our office.  We may need you to come into the office for an incision check.      Q:  How do I take care of my incision?  A:  If you have a dressing in place - Starting the day after surgery, replace the dressing 1-2 times a day until there is no further drainage from the incision.  At that time, a dressing is no longer needed.  Try to minimize tape on the skin if irritation is occurring at the tape sites.  If you have significant irritation from tape on the skin, please call the office to discuss other method of dressing your incision.    Small pieces of tape called  steri-strips  may be present directly overlying your incision; these may be removed 10 days after surgery unless otherwise specified by your surgeon.  If these tapes start to loosen at the ends, you may trim them back until they fall off or are removed.    A:  If you had  Dermabond  tissue glue used as a dressing (this causes your incision to look shiny with a clear covering over it) - This type of dressing wears off with time and does not require more dressings over the top unless it is draining around the glue as it wears off.  Do not apply ointments or lotions over the incisions until the glue has completely worn off.    Q:  There is a piece of tape or a sticky  lead  still on my skin.  Can I remove this?  A:  Sometimes the sticky  leads  used for monitoring during surgery or for evaluation in the emergency department are not all removed while you are in the hospital.  These sometimes have a tab or metal dot on them.  You can easily remove these on your own, like taking off a band-aid.  If there is a gel substance under the  lead , simply wipe/clean it off with a washcloth or paper towel.      Q:  What can I do to minimize constipation (very hard stools, or lack of stools)?  A:  Stay well hydrated.  Increase your dietary fiber intake or take a fiber supplement  -with plenty of water.  Walk around frequently.  You may consider an over-the-counter stool-softener.  Your Pharmacist can assist you with choosing one that is stocked at your pharmacy.  Constipation is also one of the most common side effects of pain medication.  If you are using pain medication, be pro-active and try to PREVENT problems with constipation by taking the steps above BEFORE constipation becomes a problem.    Q:  What do I do if I need more pain medications?  A:  Call the office to receive refills.  Be aware that certain pain meds cannot be called into a pharmacy and actually require a paper prescription.  A change may be made in your pain med as you progress thru your recovery period or if you have side effects to certain meds.    --Pain meds are NOT refilled after 5pm on weekdays, and NOT AT ALL on the weekends, so please look ahead to prevent problems.      Q:  Why am I having a hard time sleeping now that I am at home?  A:  Many medications you receive while you are in the hospital can impact your sleep for a number of days after your surgery/hospitalization.  Decreased level of activity and naps during the day may also make sleeping at night difficult.  Try to minimize day-time naps, and get up frequently during the day to walk around your home during your recovery time.  Sleep aides may be of some help, but are not recommended for long-term use.      Q:  I am having some back discomfort.  What should I do?  A:  This may be related to certain positioning that was required for your surgery, extended periods of time in bed, or other changes in your overall activity level.  You may try ice, heat, acetaminophen, or ibuprofen to treat this temporarily.  Note that many pain medications have acetaminophen in them and would state this on the prescription bottle.  Be sure not to exceed the maximum of 4000mg per day of acetaminophen.     **If the pain you are having does not resolve, is severe, or is a  flare of back pain you have had on other occasions prior to surgery, please contact your primary physician for further recommendations or for an appointment to be examined at their office.    Q:  Why am I having headaches?  A:  Headaches can be caused by many things:  caffeine withdrawal, use of pain meds, dehydration, high blood pressure, lack of sleep, over-activity/exhaustion, flare-up of usual migraine headaches.  If you feel this is related to muscle tension (a band-like feeling around the head, or a pressure at the low-back of the head) you may try ice or heat to this area.  You may need to drink more fluids (try electrolyte drink like Gatorade), rest, or take your usual migraine medications.   **If your headaches do not resolve, worsen, are accompanied by other symptoms, or if your blood pressure is high, please call your primary physician for recommendation and/or examination.    Q:  I am unable to urinate.  What do I do?  A:  A small percentage of people can have difficulty urinating initially after surgery.  This includes being able to urinate only a very small amount at a time and feeling discomfort or pressure in the very low abdomen.  This is called  urinary retention , and is actually an urgent situation.  Proceed to your nearest Emergency department for evaluation (not an Urgent Care Center).  Sometimes the bladder does not work correctly after certain medications you receive during surgery, or related to certain procedures.  You may need to have a catheter placed until your bladder recovers.  When planning to go to an Emergency department, it may help to call the ER to let them know you are coming in for this problem after a surgery.  This may help you get in quicker to be evaluated.  **If you have symptoms of a urinary tract infection, please contact your primary physician for the proper evaluation and treatment.          If you have other questions, please call the office Monday thru Friday between  8am and 5pm to discuss with the nurse or physician assistant.  #[phone]    There is a surgeon ON CALL on weekday evenings and over the weekend in case of urgent need only, and may be contacted at the same number.    If you are having an emergency, call 911 or proceed to your nearest emergency department.    GENERAL ANESTHESIA OR SEDATION ADULT DISCHARGE INSTRUCTIONS   SPECIAL PRECAUTIONS FOR 24 HOURS AFTER SURGERY    IT IS NOT UNUSUAL TO FEEL LIGHT-HEADED OR FAINT, UP TO 24 HOURS AFTER SURGERY OR WHILE TAKING PAIN MEDICATION.  IF YOU HAVE THESE SYMPTOMS; SIT FOR A FEW MINUTES BEFORE STANDING AND HAVE SOMEONE ASSIST YOU WHEN YOU GET UP TO WALK OR USE THE BATHROOM.    YOU SHOULD REST AND RELAX FOR THE NEXT 24 HOURS AND YOU MUST MAKE ARRANGEMENTS TO HAVE SOMEONE STAY WITH YOU FOR AT LEAST 24 HOURS AFTER YOUR DISCHARGE.  AVOID HAZARDOUS AND STRENUOUS ACTIVITIES.  DO NOT MAKE IMPORTANT DECISIONS FOR 24 HOURS.    DO NOT DRIVE ANY VEHICLE OR OPERATE MECHANICAL EQUIPMENT FOR 24 HOURS FOLLOWING THE END OF YOUR SURGERY.  EVEN THOUGH YOU MAY FEEL NORMAL, YOUR REACTIONS MAY BE AFFECTED BY THE MEDICATION YOU HAVE RECEIVED.    DO NOT DRINK ALCOHOLIC BEVERAGES FOR 24 HOURS FOLLOWING YOUR SURGERY.    DRINK CLEAR LIQUIDS (APPLE JUICE, GINGER ALE, 7-UP, BROTH, ETC.).  PROGRESS TO YOUR REGULAR DIET AS YOU FEEL ABLE.    YOU MAY HAVE A DRY MOUTH, A SORE THROAT, MUSCLES ACHES OR TROUBLE SLEEPING.  THESE SHOULD GO AWAY AFTER 24 HOURS.    CALL YOUR DOCTOR FOR ANY OF THE FOLLOWING:  SIGNS OF INFECTION (FEVER, GROWING TENDERNESS AT THE SURGERY SITE, A LARGE AMOUNT OF DRAINAGE OR BLEEDING, SEVERE PAIN, FOUL-SMELLING DRAINAGE, REDNESS OR SWELLING.    IT HAS BEEN OVER 8 TO 10 HOURS SINCE SURGERY AND YOU ARE STILL NOT ABLE TO URINATE (PASS WATER).     You Ibuprofen (Motrin) 600mg at 3:45pm.  Do not take any ibuprofen products until 9:45pm.

## 2017-09-21 NOTE — ANESTHESIA POSTPROCEDURE EVALUATION
Patient: Raul Rhodeslillianmichael    Procedure(s):  open incarcerated ventral hernia repair with mesh - Wound Class: I-Clean    Diagnosis:Incarcerated Umbilical Hernia   Diagnosis Additional Information: Incarcerated ventral hernia containing strangulated omentum    Anesthesia Type:  General, ETT    Note:  Anesthesia Post Evaluation    Patient location during evaluation: PACU  Patient participation: Able to fully participate in evaluation  Level of consciousness: awake and alert  Pain management: adequate  Airway patency: patent  Cardiovascular status: acceptable  Respiratory status: acceptable  Hydration status: acceptable  PONV: none     Anesthetic complications: None          Last vitals:  Vitals:    09/21/17 1530 09/21/17 1545 09/21/17 1611   BP: 165/85 148/81 143/52   Resp: 15 9 10   Temp: 98.2  F (36.8  C)  97.4  F (36.3  C)   SpO2: 99% 97% 96%         Electronically Signed By: Kane Pandya MD  September 21, 2017  4:49 PM

## 2017-09-21 NOTE — ANESTHESIA PREPROCEDURE EVALUATION
Anesthesia Evaluation     .             ROS/MED HX    ENT/Pulmonary:     (+)sleep apnea, Mild Persistent asthma , . .    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 : . . . :. .      (-) CHF, pacemaker, pulmonary hypertension and pacemaker   METS/Exercise Tolerance:     Hematologic:        (-) anemia   Musculoskeletal:   (+) arthritis (RA), , , -       GI/Hepatic:        (-) GERD and hepatitis   Renal/Genitourinary:      (-) renal disease   Endo:     (+) Obesity, .   (-) Type I DM, Type II DM, thyroid disease and chronic steroid usage   Psychiatric:        (-) psychiatric history   Infectious Disease:  - neg infectious disease ROS       Malignancy:   (+) Malignancy History of Prostate          Other:    (+) H/O Chronic Pain,                   Physical Exam      Airway   Mallampati: II  TM distance: >3 FB  Neck ROM: full    Dental     Cardiovascular   Rhythm and rate: normal  (-) no murmur    Pulmonary    breath sounds clear to auscultation    Other findings: Lab Test        09/16/17 03/22/17 03/08/16 2023          0958          0850          WBC          8.7          5.4          7.2           HGB          12.3*        12.6*        12.3*         MCV          92           94           96            PLT          243          222          223            Lab Test        09/16/17 03/22/17 11/30/16 2023          0958          1357          NA           136          142          143           POTASSIUM    3.5          3.7          4.0           CHLORIDE     102          108          108           CO2          26           23           23            BUN          15           20           20            CR           0.88         0.89         1.04          ANIONGAP     8            11           12            [place]          8.9          9.0          8.9           GLC           100*         91           87                          Anesthesia Plan      History & Physical Review  History and physical reviewed and following examination; no interval change.    ASA Status:  3 .    NPO Status:  > 8 hours    Plan for General and ETT with Propofol induction. Maintenance will be Balanced.    PONV prophylaxis:  Ondansetron (or other 5HT-3) and Dexamethasone or Solumedrol       Postoperative Care  Postoperative pain management:  IV analgesics and Oral pain medications.      Consents  Anesthetic plan, risks, benefits and alternatives discussed with:  Patient..                          .

## 2017-09-21 NOTE — ANESTHESIA CARE TRANSFER NOTE
Patient: Raul Rhodeslillianmichael    Procedure(s):  open incarcerated ventral hernia repair with mesh - Wound Class: I-Clean    Diagnosis: Incarcerated Umbilical Hernia   Diagnosis Additional Information: No value filed.    Anesthesia Type:   General, ETT     Note:  Airway :Face Mask  Patient transferred to:PACU  Comments: To PACU, Awake, VSS, SV, O2, Report to RN      Vitals: (Last set prior to Anesthesia Care Transfer)    CRNA VITALS  9/21/2017 1359 - 9/21/2017 1437      9/21/2017             EKG: Sinus rhythm                Electronically Signed By: Dean Dennis Severson, APRN CRNA  September 21, 2017  2:37 PM

## 2017-09-21 NOTE — OP NOTE
General Surgery Operative Note      Pre-operative diagnosis: Incarcerated Ventral Hernia    Post-operative diagnosis: Incarcerated ventral hernia containing strangulated omentum    Procedure: Preperitoneal incarcerated ventral hernia repair with 6.4 cm Ventralex ST hernia patch     Surgeon: Cristina Robbins MD   Assistant(s): Nery Moreno PA-C  The Physician Assistant was medically necessary for their expertise in prepping, suctioning, suturing and retraction.   Anesthesia: General    Estimated blood loss:  Complications: 5 cc  None   Specimens:   ID Type Source Tests Collected by Time Destination   A : Umbilical hernia sac and incarcerated contents Tissue Abdomen SURGICAL PATHOLOGY EXAM Cristina Robbins MD 9/21/2017  1:51 PM         DESCRIPTION OF PROCEDURE: The patient was placed on the table in supine position. The abdomen was prepped and draped in standard sterile fashion. A transverse incision just below the umbilicus was made with a blade. The incision was carried down into the subcutaneous tissue. The undersurface of the umbilical skin was  from the hernia sac using the Bovie electrocautery.  There was a rind of inflammatory tissue surrounding the hernia sac.  The hernia sac and contents (omentum) were  from surrounding subcutaneous tissue using blunt and sharp dissection down to the level of the fascia.  The sac was entered and the incarcerated omentum within the hernia sac was serially ligated and divided with 2-0 Vicryl ties.  The inflammatory rind, hernia sac, and incarcerated omentum were passed off the field as specimen.   The hernia defect measured 2 cm.  the peritoneum was closed with running 2-0 Vicryl suture.  The preperitoneal space was developed circumferentially to allow for placement of the 6.4 cm Ventralex ST mesh. We then trimmed the leaflets at the level of the fascia and closed the fascia transversely with a running 0 PDS suture, incorporating the leaflets  of the mesh. Hemostasis was maintained throughout with cautery.  Local anesthetic was infused into the skin and subcutaneous tissue.  We irrigated the wound. We reapproximated the undersurface of the umbilical skin to the fascia using a 3-0 Vicryl undyed suture. The skin was closed with a running 4-0 Vicryl subcuticular suture and Dermabond. The patient tolerated the procedure well. Sponge and instrument counts were correct.   Cristina Robbins MD

## 2017-09-21 NOTE — IP AVS SNAPSHOT
[facility] PreOP/PostOP    [address]    [facility] [address]    Phone:  [phone]    Fax:  [phone]                                       After Visit Summary   9/21/2017    Raul Younger    MRN: [id number]           After Visit Summary Signature Page     I have received my discharge instructions, and my questions have been answered. I have discussed any challenges I see with this plan with the nurse or doctor.    ..........................................................................................................................................  Patient/Patient Representative Signature      ..........................................................................................................................................  Patient Representative Print Name and Relationship to Patient    ..................................................               ................................................  Date                                            Time    ..........................................................................................................................................  Reviewed by Signature/Title    ...................................................              ..............................................  Date                                                            Time

## 2017-09-21 NOTE — IP AVS SNAPSHOT
MRN:[id number]                      After Visit Summary   9/21/2017    Raul Younger    MRN: [id number]           Thank you!     Thank you for choosing [facility] for your care. Our goal is always to provide you with excellent care. Hearing back from our patients is one way we can continue to improve our services. Please take a few minutes to complete the written survey that you may receive in the mail after you visit. If you would like to speak to someone directly about your visit please contact Patient Relations at [phone]. Thank you!          Patient Information     Date Of Birth          1935        About your hospital stay     You were admitted on:  September 21, 2017 You last received care in the:  [facility] PreOP/PostOP    You were discharged on:  September 21, 2017       Who to Call     For medical emergencies, please call 911.  For non-urgent questions about your medical care, please call your primary care provider or clinic, [phone]  For questions related to your surgery, please call your surgery clinic        Attending Provider     Provider Specialty    Cristina Robbins MD Surgery       Primary Care Provider Office Phone # Fax #    Jose D Vasquez [phone]      After Care Instructions     Ice to affected area       Ice to operative site PRN                  Your next 10 appointments already scheduled     Sourav 15, 2018  1:00 PM CST   Return Prostate Cancer with Bhavin Celeste MD   [facility] ([facility])    [address]  [address]  [facility] [id number] [phone]            Jul 16, 2018 11:30 AM CDT   Return Sleep Patient with Yoan Maria MD   [facility] ([facility])    [address]  [address]  [facility] [place] [address] [phone]              Further instructions from your care team      "  HOME CARE FOLLOWING UMBILICAL/VENTRAL HERNIA REPAIR  MIGUEL Washburn E. Gavin, MIGUEL Torres, MAR De La Garza    DIET:  No restrictions.  Increased fluid intake is recommended. While taking pain medications, increase dietary fiber or add a fiber supplementation like Metamucil or Citrucel to help prevent constipation - a possible side effect of pain medications.    NAUSEA:  If nauseated from the anesthetic/pain meds; rest in bed, get up cautiously with assistance, and drink clear liquids (juice, tea, broth).    ACTIVITY:  Light Activity -- you may immediately be up and about as tolerated.  Driving -- you may drive when comfortable and off narcotic pain medications.  Light Work -- resume when comfortable off pain medications.  (If you can drive, you probably can work.)  Strenuous Work/Activity -- limit lifting to 20 pounds for 3 weeks.  Active Sports (running, biking, etc.) -- cautiously resume after 4 weeks.    INCISIONAL CARE:    If you have a dressing in place, keep clean and dry for 48 hours after surgery.  After this timeframe, you may replace the gauze daily if it becomes soiled.    You may remove the dressing and shower 48 hours after surgery.  Do not submerse incision in water for 1 week.    If you have a Dermabond dressing (a type of skin glue), you may shower immediately.    Sutures will absorb and need not be removed.    If present, leave the steri-strips (white paper tapes) in place for 14 days after surgery.    If present, leave Dermabond glue in place until it wears/flakes off.    Expect a variable amount of swelling/bruising/discoloration that may appear around or below the repair site.    Some numbness around the incision is common.    A lump/\"healing ridge\" under the incision is normal and will gradually resolve over the following 1-2 months.    DISCOMFORT:  Local anesthetic placed at surgery should provide relief for 4-8 hours.  Begin taking pain pills before " discomfort is severe.  Take the pain medication with some food, when possible, to minimize side effects.  Intermittent use of ice packs to the hernia repair site may help during the first 1-3 weeks after surgery.  Expect gradual improvement.    Over-the-counter anti-inflammatory medications (i.e. Ibuprofen/Advil/Motrin or Naprosyn/Aleve) may be used per package instructions in addition to or while tapering off the narcotic pain medications to decrease swelling and sensitivity at the repair site.  DO NOT TAKE these Anti-inflammatory medications if your primary physician has advised against doing so, or if you have acid reflux, ulcer, or bleeding disorder, or take blood-thinner medications.  Call your primary physician or the surgery office if you have medication questions.      RETURN APPOINTMENT:  Schedule a follow-up visit 2-3 weeks post-op.  Office Phone:  [phone]     CONTACT US IF THE FOLLOWING DEVELOPS:   1. A fever that is above 101     2. If there is a large amount of drainage, bleeding, or swelling.   3. Severe pain that is not relieved by your prescription.   4. Drainage that is thick, cloudy, yellow, green or white.   5. Any other questions not answered by  Frequently Asked Questions  sheet.      FREQUENTLY ASKED QUESTIONS:    Q:  How should my incision look?    A:  Normally your incision will appear slightly swollen with light redness directly along the incision itself as it heals.  It may feel like a bump or ridge as the healing/scarring happens, and over time (3-4 months) this bump or ridge feeling should slowly go away.  In general, clear or pink watery drainage can be normal at first as your incision heals, but should decrease over time.    Q:  How do I know if my incision is infected?  A:  Look at your incision for signs of infection, like redness around the incision spreading to surrounding skin, or drainage of cloudy or foul-smelling drainage.  If you feel warm, check your temperature to see if  you are running a fever.    **If any of these things occur, please notify the nurse at our office.  We may need you to come into the office for an incision check.      Q:  How do I take care of my incision?  A:  If you have a dressing in place - Starting the day after surgery, replace the dressing 1-2 times a day until there is no further drainage from the incision.  At that time, a dressing is no longer needed.  Try to minimize tape on the skin if irritation is occurring at the tape sites.  If you have significant irritation from tape on the skin, please call the office to discuss other method of dressing your incision.    Small pieces of tape called  steri-strips  may be present directly overlying your incision; these may be removed 10 days after surgery unless otherwise specified by your surgeon.  If these tapes start to loosen at the ends, you may trim them back until they fall off or are removed.    A:  If you had  Dermabond  tissue glue used as a dressing (this causes your incision to look shiny with a clear covering over it) - This type of dressing wears off with time and does not require more dressings over the top unless it is draining around the glue as it wears off.  Do not apply ointments or lotions over the incisions until the glue has completely worn off.    Q:  There is a piece of tape or a sticky  lead  still on my skin.  Can I remove this?  A:  Sometimes the sticky  leads  used for monitoring during surgery or for evaluation in the emergency department are not all removed while you are in the hospital.  These sometimes have a tab or metal dot on them.  You can easily remove these on your own, like taking off a band-aid.  If there is a gel substance under the  lead , simply wipe/clean it off with a washcloth or paper towel.      Q:  What can I do to minimize constipation (very hard stools, or lack of stools)?  A:  Stay well hydrated.  Increase your dietary fiber intake or take a fiber supplement  -with plenty of water.  Walk around frequently.  You may consider an over-the-counter stool-softener.  Your Pharmacist can assist you with choosing one that is stocked at your pharmacy.  Constipation is also one of the most common side effects of pain medication.  If you are using pain medication, be pro-active and try to PREVENT problems with constipation by taking the steps above BEFORE constipation becomes a problem.    Q:  What do I do if I need more pain medications?  A:  Call the office to receive refills.  Be aware that certain pain meds cannot be called into a pharmacy and actually require a paper prescription.  A change may be made in your pain med as you progress thru your recovery period or if you have side effects to certain meds.    --Pain meds are NOT refilled after 5pm on weekdays, and NOT AT ALL on the weekends, so please look ahead to prevent problems.      Q:  Why am I having a hard time sleeping now that I am at home?  A:  Many medications you receive while you are in the hospital can impact your sleep for a number of days after your surgery/hospitalization.  Decreased level of activity and naps during the day may also make sleeping at night difficult.  Try to minimize day-time naps, and get up frequently during the day to walk around your home during your recovery time.  Sleep aides may be of some help, but are not recommended for long-term use.      Q:  I am having some back discomfort.  What should I do?  A:  This may be related to certain positioning that was required for your surgery, extended periods of time in bed, or other changes in your overall activity level.  You may try ice, heat, acetaminophen, or ibuprofen to treat this temporarily.  Note that many pain medications have acetaminophen in them and would state this on the prescription bottle.  Be sure not to exceed the maximum of 4000mg per day of acetaminophen.     **If the pain you are having does not resolve, is severe, or is a  flare of back pain you have had on other occasions prior to surgery, please contact your primary physician for further recommendations or for an appointment to be examined at their office.    Q:  Why am I having headaches?  A:  Headaches can be caused by many things:  caffeine withdrawal, use of pain meds, dehydration, high blood pressure, lack of sleep, over-activity/exhaustion, flare-up of usual migraine headaches.  If you feel this is related to muscle tension (a band-like feeling around the head, or a pressure at the low-back of the head) you may try ice or heat to this area.  You may need to drink more fluids (try electrolyte drink like Gatorade), rest, or take your usual migraine medications.   **If your headaches do not resolve, worsen, are accompanied by other symptoms, or if your blood pressure is high, please call your primary physician for recommendation and/or examination.    Q:  I am unable to urinate.  What do I do?  A:  A small percentage of people can have difficulty urinating initially after surgery.  This includes being able to urinate only a very small amount at a time and feeling discomfort or pressure in the very low abdomen.  This is called  urinary retention , and is actually an urgent situation.  Proceed to your nearest Emergency department for evaluation (not an Urgent Care Center).  Sometimes the bladder does not work correctly after certain medications you receive during surgery, or related to certain procedures.  You may need to have a catheter placed until your bladder recovers.  When planning to go to an Emergency department, it may help to call the ER to let them know you are coming in for this problem after a surgery.  This may help you get in quicker to be evaluated.  **If you have symptoms of a urinary tract infection, please contact your primary physician for the proper evaluation and treatment.          If you have other questions, please call the office Monday thru Friday between  8am and 5pm to discuss with the nurse or physician assistant.  #[phone]    There is a surgeon ON CALL on weekday evenings and over the weekend in case of urgent need only, and may be contacted at the same number.    If you are having an emergency, call 911 or proceed to your nearest emergency department.    GENERAL ANESTHESIA OR SEDATION ADULT DISCHARGE INSTRUCTIONS   SPECIAL PRECAUTIONS FOR 24 HOURS AFTER SURGERY    IT IS NOT UNUSUAL TO FEEL LIGHT-HEADED OR FAINT, UP TO 24 HOURS AFTER SURGERY OR WHILE TAKING PAIN MEDICATION.  IF YOU HAVE THESE SYMPTOMS; SIT FOR A FEW MINUTES BEFORE STANDING AND HAVE SOMEONE ASSIST YOU WHEN YOU GET UP TO WALK OR USE THE BATHROOM.    YOU SHOULD REST AND RELAX FOR THE NEXT 24 HOURS AND YOU MUST MAKE ARRANGEMENTS TO HAVE SOMEONE STAY WITH YOU FOR AT LEAST 24 HOURS AFTER YOUR DISCHARGE.  AVOID HAZARDOUS AND STRENUOUS ACTIVITIES.  DO NOT MAKE IMPORTANT DECISIONS FOR 24 HOURS.    DO NOT DRIVE ANY VEHICLE OR OPERATE MECHANICAL EQUIPMENT FOR 24 HOURS FOLLOWING THE END OF YOUR SURGERY.  EVEN THOUGH YOU MAY FEEL NORMAL, YOUR REACTIONS MAY BE AFFECTED BY THE MEDICATION YOU HAVE RECEIVED.    DO NOT DRINK ALCOHOLIC BEVERAGES FOR 24 HOURS FOLLOWING YOUR SURGERY.    DRINK CLEAR LIQUIDS (APPLE JUICE, GINGER ALE, 7-UP, BROTH, ETC.).  PROGRESS TO YOUR REGULAR DIET AS YOU FEEL ABLE.    YOU MAY HAVE A DRY MOUTH, A SORE THROAT, MUSCLES ACHES OR TROUBLE SLEEPING.  THESE SHOULD GO AWAY AFTER 24 HOURS.    CALL YOUR DOCTOR FOR ANY OF THE FOLLOWING:  SIGNS OF INFECTION (FEVER, GROWING TENDERNESS AT THE SURGERY SITE, A LARGE AMOUNT OF DRAINAGE OR BLEEDING, SEVERE PAIN, FOUL-SMELLING DRAINAGE, REDNESS OR SWELLING.    IT HAS BEEN OVER 8 TO 10 HOURS SINCE SURGERY AND YOU ARE STILL NOT ABLE TO URINATE (PASS WATER).     You Ibuprofen (Motrin) 600mg at 3:45pm.  Do not take any ibuprofen products until 9:45pm.            Pending Results     Date and Time Order Name Status Description    9/21/2017 1355  "Surgical pathology exam In process             Admission Information     Date & Time Provider Department Dept. Phone    9/21/2017 Cristina Robbins MD [facility] PreOP/PostOP [phone]      Your Vitals Were     Blood Pressure Temperature Respirations Height Weight Pulse Oximetry    143/52 97.4  F (36.3  C) (Temporal) 10 1.753 m (5' 9\") 97.7 kg (215 lb 6.4 oz) 96%    BMI (Body Mass Index)                   31.81 kg/m2           MyChart Information     [employer] gives you secure access to your electronic health record. If you see a primary care provider, you can also send messages to your care team and make appointments. If you have questions, please call your primary care clinic.  If you do not have a primary care provider, please call [phone] and they will assist you.        Care EveryWhere ID     This is your Care EveryWhere ID. This could be used by other organizations to access your [facility] medical records  [id number]        Equal Access to Services     DAO TELLEZ AH: Hadii simona corarl hadasho Soomaali, waaxda luqadaha, qaybta kaalmada adeegyada, waxay brenda harrison . So [facility] [phone].    ATENCIÓN: Si habla español, tiene a bautista disposición servicios gratuitos de asistencia lingüística. Llame al [phone].    We comply with applicable federal civil rights laws and [place] laws. We do not discriminate on the basis of race, color, national origin, age, disability sex, sexual orientation or gender identity.               Review of your medicines      START taking        Dose / Directions    acetaminophen 325 MG tablet   Commonly known as:  TYLENOL   Used for:  Ventral hernia without obstruction or gangrene        Dose:  650 mg   Take 2 tablets (650 mg) by mouth every 4 hours as needed for other (mild pain)   Quantity:  100 tablet   Refills:  0       senna-docusate 8.6-50 MG per tablet   Commonly known as:  SENOKOT-S;PERICOLACE   Used for:  Ventral hernia without obstruction " or gangrene        Dose:  1-2 tablet   Take 1-2 tablets by mouth 2 times daily Take while on oral narcotics to prevent or treat constipation.   Quantity:  30 tablet   Refills:  0         CONTINUE these medicines which may have CHANGED, or have new prescriptions. If we are uncertain of the size of tablets/capsules you have at home, strength may be listed as something that might have changed.        Dose / Directions    * oxyCODONE 5 MG IR tablet   Commonly known as:  ROXICODONE   This may have changed:  Another medication with the same name was added. Make sure you understand how and when to take each.        Dose:  5 mg   Take 1 tablet (5 mg) by mouth every 6 hours as needed for pain   Quantity:  12 tablet   Refills:  0       * oxyCODONE 5 MG IR tablet   Commonly known as:  ROXICODONE   This may have changed:  You were already taking a medication with the same name, and this prescription was added. Make sure you understand how and when to take each.   Used for:  Ventral hernia without obstruction or gangrene        Dose:  5-10 mg   Take 1-2 tablets (5-10 mg) by mouth every 3 hours as needed for pain or other (Moderate to Severe)   Quantity:  20 tablet   Refills:  0       * Notice:  This list has 2 medication(s) that are the same as other medications prescribed for you. Read the directions carefully, and ask your doctor or other care provider to review them with you.      CONTINUE these medicines which have NOT CHANGED        Dose / Directions    ALEVE 220 MG tablet   Generic drug:  naproxen sodium        Take by mouth as needed for moderate pain Reported on 3/22/2017   Refills:  0       amLODIPine 5 MG tablet   Commonly known as:  NORVASC   Used for:  Essential hypertension, benign        Dose:  2.5 mg   Take 0.5 tablets (2.5 mg) by mouth daily   Quantity:  180 tablet   Refills:  4       aspirin 81 MG tablet        Dose:  1 tablet   Take 1 tablet by mouth daily.   Refills:  0       cetirizine 10 MG tablet   Commonly  known as:  zyrTEC        Dose:  10 mg   Take 10 mg by mouth daily   Refills:  0       clotrimazole 1 % cream   Commonly known as:  LOTRIMIN        Apply topically 2 times daily as needed   Refills:  0       docusate calcium 240 MG capsule   Commonly known as:  SURFAK        Dose:  240 mg   Take 240 mg by mouth daily as needed   Refills:  0       folic acid 1 MG tablet   Commonly known as:  FOLVITE        Dose:  1 mg   Take 1 mg by mouth daily   Refills:  0       Menthol (Topical Analgesic) 2 % Gel        daily as needed   Refills:  0       methotrexate 2.5 MG tablet CHEMO   Indication:  10 tabs at once        Dose:  25 mg   Take 25 mg by mouth once a week On Thursdays   Refills:  0       PRESERVISION AREDS PO        Dose:  1 tablet   Take 1 tablet by mouth every morning   Refills:  0       REMICADE IV        Infusion every 7 weeks   Refills:  0       simvastatin 20 MG tablet   Commonly known as:  ZOCOR   Used for:  Mixed hyperlipidemia        Dose:  20 mg   Take 1 tablet (20 mg) by mouth At Bedtime   Quantity:  90 tablet   Refills:  4       triamterene-hydrochlorothiazide 37.5-25 MG per tablet   Commonly known as:  MAXZIDE-25   Used for:  Essential hypertension, benign, Edema, unspecified type        Dose:  2 tablet   Take 2 tablets by mouth daily   Quantity:  180 tablet   Refills:  1            Where to get your medicines      These medications were sent to [facility] [place] [address]     Phone:  [phone]     senna-docusate 8.6-50 MG per tablet         Some of these will need a paper prescription and others can be bought over the counter. Ask your nurse if you have questions.     Bring a paper prescription for each of these medications     oxyCODONE 5 MG IR tablet       You don't need a prescription for these medications     acetaminophen 325 MG tablet                Protect others around you: Learn how to safely use, store  and throw away your medicines at www.disposemymeds.org.             Medication List: This is a list of all your medications and when to take them. Check marks below indicate your daily home schedule. Keep this list as a reference.      Medications           Morning Afternoon Evening Bedtime As Needed    acetaminophen 325 MG tablet   Commonly known as:  TYLENOL   Take 2 tablets (650 mg) by mouth every 4 hours as needed for other (mild pain)                                ALEVE 220 MG tablet   Take by mouth as needed for moderate pain Reported on 3/22/2017   Generic drug:  naproxen sodium                                amLODIPine 5 MG tablet   Commonly known as:  NORVASC   Take 0.5 tablets (2.5 mg) by mouth daily                                aspirin 81 MG tablet   Take 1 tablet by mouth daily.                                cetirizine 10 MG tablet   Commonly known as:  zyrTEC   Take 10 mg by mouth daily                                clotrimazole 1 % cream   Commonly known as:  LOTRIMIN   Apply topically 2 times daily as needed                                docusate calcium 240 MG capsule   Commonly known as:  SURFAK   Take 240 mg by mouth daily as needed                                folic acid 1 MG tablet   Commonly known as:  FOLVITE   Take 1 mg by mouth daily                                Menthol (Topical Analgesic) 2 % Gel   daily as needed                                methotrexate 2.5 MG tablet CHEMO   Take 25 mg by mouth once a week On Thursdays                                * oxyCODONE 5 MG IR tablet   Commonly known as:  ROXICODONE   Take 1 tablet (5 mg) by mouth every 6 hours as needed for pain                                * oxyCODONE 5 MG IR tablet   Commonly known as:  ROXICODONE   Take 1-2 tablets (5-10 mg) by mouth every 3 hours as needed for pain or other (Moderate to Severe)                                PRESERVISION AREDS PO   Take 1 tablet by mouth every morning                                 REMICADE IV   Infusion every 7 weeks                                senna-docusate 8.6-50 MG per tablet   Commonly known as:  SENOKOT-S;PERICOLACE   Take 1-2 tablets by mouth 2 times daily Take while on oral narcotics to prevent or treat constipation.                                simvastatin 20 MG tablet   Commonly known as:  ZOCOR   Take 1 tablet (20 mg) by mouth At Bedtime                                triamterene-hydrochlorothiazide 37.5-25 MG per tablet   Commonly known as:  MAXZIDE-25   Take 2 tablets by mouth daily                                * Notice:  This list has 2 medication(s) that are the same as other medications prescribed for you. Read the directions carefully, and ask your doctor or other care provider to review them with you.

## 2017-09-22 LAB — COPATH REPORT: NORMAL

## 2017-10-04 DIAGNOSIS — E78.2 MIXED HYPERLIPIDEMIA: ICD-10-CM

## 2017-10-04 NOTE — TELEPHONE ENCOUNTER
Simvastatin  Last Written Prescription Date: 09/15/16  Last Fill Quantity: 90, # refills: 4  Last Office Visit with [facility] or [facility] prescribing provider: 09/19/17 Yimi       Lab Results   Component Value Date    CHOL 152 03/22/2017     Lab Results   Component Value Date    HDL 51 03/22/2017     Lab Results   Component Value Date    LDL 68 03/22/2017     Lab Results   Component Value Date    TRIG 165 03/22/2017     Lab Results   Component Value Date    CHOLHDLRATIO 2.8 02/19/2015       Labs showing if normal/abnormal  Lab Results   Component Value Date    CHOL 152 03/22/2017    TRIG 165 (H) 03/22/2017    HDL 51 03/22/2017    LDL 68 03/22/2017    VLDL 40 (H) 02/19/2015    CHOLHDLRATIO 2.8 02/19/2015

## 2017-10-05 RX ORDER — SIMVASTATIN 20 MG
20 TABLET ORAL AT BEDTIME
Qty: 90 TABLET | Refills: 1 | Status: SHIPPED | OUTPATIENT
Start: 2017-10-05 | End: 2018-04-03

## 2017-10-10 ENCOUNTER — OFFICE VISIT (OUTPATIENT)
Dept: SURGERY | Facility: CLINIC | Age: 82
End: 2017-10-10
Payer: COMMERCIAL

## 2017-10-10 VITALS — BODY MASS INDEX: 31.84 KG/M2 | WEIGHT: 215 LBS | HEIGHT: 69 IN

## 2017-10-10 DIAGNOSIS — Z09 SURGICAL FOLLOWUP VISIT: Primary | ICD-10-CM

## 2017-10-10 PROCEDURE — 99024 POSTOP FOLLOW-UP VISIT: CPT | Performed by: PHYSICIAN ASSISTANT

## 2017-10-10 NOTE — MR AVS SNAPSHOT
After Visit Summary   10/10/2017    Raul Younger    MRN: [id number]           Patient Information     Date Of Birth          1935        Visit Information        Provider Department      10/10/2017 1:00 PM Tapan Watt PA-C [facility]      Today's Diagnoses     Surgical followup visit    -  1       Follow-ups after your visit        Follow-up notes from your care team     Return if symptoms worsen or fail to improve.      Your next 10 appointments already scheduled     Sourav 15, 2018  1:00 PM CST   Return Prostate Cancer with Bhavin Celeste MD   [facility] [place] [facility] ([facility])    [address]  [address]  [facility] [address] [phone]            Jul 16, 2018 11:30 AM CDT   Return Sleep Patient with Yoan Maria MD   [facility] ([facility]    [address]  [place] [address] [phone]              Who to contact     If you have questions or need follow up information about today's clinic visit or your schedule please contact [facility] directly at [phone].  Normal or non-critical lab and imaging results will be communicated to you by MyChart, letter or phone within 4 business days after the clinic has received the results. If you do not hear from us within 7 days, please contact the clinic through [facility] or phone. If you have a critical or abnormal lab result, we will notify you by phone as soon as possible.  Submit refill requests through swabr or call your pharmacy and they will forward the refill request to us. Please allow 3 business days for your refill to be completed.          Additional Information About Your Visit        [facility] Information     swabr gives you secure access to your electronic health record. If you see a primary  "care provider, you can also send messages to your care team and make appointments. If you have questions, please call your primary care clinic.  If you do not have a primary care provider, please call [phone] and they will assist you.        Care EveryWhere ID     This is your Care EveryWhere ID. This could be used by other organizations to access your [facility] medical records  [id number]        Your Vitals Were     Height BMI (Body Mass Index)                1.753 m (5' 9\") 31.75 kg/m2           Blood Pressure from Last 3 Encounters:   09/21/17 134/72   09/19/17 124/80   09/18/17 122/76    Weight from Last 3 Encounters:   10/10/17 97.5 kg (215 lb)   09/21/17 97.7 kg (215 lb 6.4 oz)   09/19/17 99.2 kg (218 lb 11.2 oz)              Today, you had the following     No orders found for display       Primary Care Provider Office Phone # Fax #    Jose D Vasquez [phone]       [address] [place] [id number]        Equal Access to Services     [facility] : Hadii aad ku hadasho Sovinicio, waaxda luqadaha, qaybta kaalmada elan, wei harrison . So [facility] [phone].    ATENCIÓN: Si habla español, tiene a bautista disposición servicios gratuitos de asistencia lingüística. [facility] [phone].    We comply with applicable federal civil rights laws and [place] laws. We do not discriminate on the basis of race, color, national origin, age, disability, sex, sexual orientation, or gender identity.            Thank you!     Thank you for choosing [facility]  for your care. Our goal is always to provide you with excellent care. Hearing back from our patients is one way we can continue to improve our services. Please take a few minutes to complete the written survey that you may receive in the mail after your visit with us. Thank you!             Your Updated Medication List - Protect others around you: Learn how to safely use, store and " throw away your medicines at www.disposemymeds.org.          This list is accurate as of: 10/10/17  1:28 PM.  Always use your most recent med list.                   Brand Name Dispense Instructions for use Diagnosis    acetaminophen 325 MG tablet    TYLENOL    100 tablet    Take 2 tablets (650 mg) by mouth every 4 hours as needed for other (mild pain)    Ventral hernia without obstruction or gangrene       ALEVE 220 MG tablet   Generic drug:  naproxen sodium      Take by mouth as needed for moderate pain Reported on 3/22/2017        amLODIPine 5 MG tablet    NORVASC    180 tablet    Take 0.5 tablets (2.5 mg) by mouth daily    Essential hypertension, benign       aspirin 81 MG tablet      Take 1 tablet by mouth daily.        cetirizine 10 MG tablet    zyrTEC     Take 10 mg by mouth daily        clotrimazole 1 % cream    LOTRIMIN     Apply topically 2 times daily as needed        docusate calcium 240 MG capsule    SURFAK     Take 240 mg by mouth daily as needed        folic acid 1 MG tablet    FOLVITE     Take 1 mg by mouth daily        Menthol (Topical Analgesic) 2 % Gel      daily as needed        methotrexate 2.5 MG tablet CHEMO      Take 25 mg by mouth once a week On Thursdays        * oxyCODONE 5 MG IR tablet    ROXICODONE    12 tablet    Take 1 tablet (5 mg) by mouth every 6 hours as needed for pain        * oxyCODONE 5 MG IR tablet    ROXICODONE    20 tablet    Take 1-2 tablets (5-10 mg) by mouth every 3 hours as needed for pain or other (Moderate to Severe)    Ventral hernia without obstruction or gangrene       PRESERVISION AREDS PO      Take 1 tablet by mouth every morning        REMICADE IV      Infusion every 7 weeks        senna-docusate 8.6-50 MG per tablet    SENOKOT-S;PERICOLACE    30 tablet    Take 1-2 tablets by mouth 2 times daily Take while on oral narcotics to prevent or treat constipation.    Ventral hernia without obstruction or gangrene       simvastatin 20 MG tablet    ZOCOR    90 tablet     Take 1 tablet (20 mg) by mouth At Bedtime    Mixed hyperlipidemia       triamterene-hydrochlorothiazide 37.5-25 MG per tablet    MAXZIDE-25    180 tablet    Take 2 tablets by mouth daily    Essential hypertension, benign, Edema, unspecified type       * Notice:  This list has 2 medication(s) that are the same as other medications prescribed for you. Read the directions carefully, and ask your doctor or other care provider to review them with you.

## 2017-10-10 NOTE — PROGRESS NOTES
[facility]     Subjective:  Raul Younger is here for his first postoperative visit. He underwent a umbilical hernia repair with mesh by Dr. Robbins on 9/21/17. Today he tells me he has been feeling well since surgery. He currently does not require narcotic pain medications, he is eating a normal diet and his bowels are regular with the help of Senna. He has no concerns today.    Objective:  Abd - soft, non-tender, non-distended  Inc - c/d/i, no erythema, +healing ridge, no masses    Assessment:  S/p umbilical hernia repair with mesh.     Plan:  Activity restrictions reviewed. Can add 10lbs lifting per week.  Call/RTC PRN      Tapan Watt PA-C  10/10/2017      Please route or send letter to:  Primary Care Provider (PCP)

## 2017-12-15 DIAGNOSIS — I10 ESSENTIAL HYPERTENSION, BENIGN: ICD-10-CM

## 2017-12-15 DIAGNOSIS — R60.9 EDEMA, UNSPECIFIED TYPE: ICD-10-CM

## 2017-12-20 RX ORDER — TRIAMTERENE/HYDROCHLOROTHIAZID 37.5-25 MG
TABLET ORAL
Qty: 180 TABLET | Refills: 2 | Status: SHIPPED | OUTPATIENT
Start: 2017-12-20 | End: 2018-06-08

## 2017-12-20 NOTE — TELEPHONE ENCOUNTER
Pt calling to inquire the status of the refill.    Creatinine   Date Value Ref Range Status   09/16/2017 0.88 0.66 - 1.25 mg/dL Final     Potassium   Date Value Ref Range Status   09/16/2017 3.5 3.4 - 5.3 mmol/L Final       BP Readings from Last 3 Encounters:   09/21/17 134/72   09/19/17 124/80   09/18/17 122/76     Prescription approved per [facility] Refill Protocol.

## 2018-01-19 DIAGNOSIS — C61 MALIGNANT NEOPLASM OF PROSTATE (H): ICD-10-CM

## 2018-01-19 PROCEDURE — 36415 COLL VENOUS BLD VENIPUNCTURE: CPT | Performed by: UROLOGY

## 2018-01-19 PROCEDURE — 84153 ASSAY OF PSA TOTAL: CPT | Performed by: UROLOGY

## 2018-01-20 LAB — PSA SERPL-MCNC: 0.56 UG/L (ref 0–4)

## 2018-01-22 ENCOUNTER — OFFICE VISIT (OUTPATIENT)
Dept: UROLOGY | Facility: CLINIC | Age: 83
End: 2018-01-22
Payer: COMMERCIAL

## 2018-01-22 VITALS — HEART RATE: 80 BPM | HEIGHT: 69 IN | WEIGHT: 218 LBS | BODY MASS INDEX: 32.29 KG/M2

## 2018-01-22 DIAGNOSIS — C61 MALIGNANT NEOPLASM OF PROSTATE (H): ICD-10-CM

## 2018-01-22 DIAGNOSIS — N39.41 URGE INCONTINENCE: Primary | ICD-10-CM

## 2018-01-22 LAB — RESIDUAL VOLUME (RV) (EXTERNAL): 160

## 2018-01-22 PROCEDURE — 51798 US URINE CAPACITY MEASURE: CPT | Performed by: UROLOGY

## 2018-01-22 PROCEDURE — 99213 OFFICE O/P EST LOW 20 MIN: CPT | Mod: 25 | Performed by: UROLOGY

## 2018-01-22 ASSESSMENT — PAIN SCALES - GENERAL: PAINLEVEL: NO PAIN (0)

## 2018-01-22 NOTE — LETTER
1/22/2018       RE: Raul AGRAWAL Carisa  [address]  [address]  [place] [address]     Dear Colleague,    Thank you for referring your patient, Raul Younger, to the [facility] [place] at [facility]. Please see a copy of my visit note below.    Office Visit Note  [facility]  [phone]    UROLOGIC DIAGNOSES:   [place] 4+4=8 prostate cancer    CURRENT INTERVENTIONS:   S/P cryoablation, on flomax and finasteride    HISTORY:   Raul returns to clinic today for prostate cancer follow-up. The PSA has risen a bit more, now at 0.56. He does have some occasional urinary incontinence but overall no bothersome urinary complaints.      PAST MEDICAL HISTORY:   Past Medical History:   Diagnosis Date     Arthritis      HTN      Malignant neoplasm (H) 01/2011    Prostate cancer     MI, old     vs. asthma per pt.'s wife     Migraine, unspecified, without mention of intractable migraine without mention of status migrainosus     Abstracted 5/15/02.     Other and unspecified hyperlipidemia     Abstracted 5/15/02.     Other and unspecified hyperlipidemia      Sleep apnea        PAST SURGICAL HISTORY:   Past Surgical History:   Procedure Laterality Date     ABDOMEN SURGERY  1980    diaphram repair     C NONSPECIFIC PROCEDURE  1996    Paraesophageal Hernia Repair. Abstracted 5/15/02.     C NONSPECIFIC PROCEDURE  1943    Tonsillectomy. Abstracted 5/15/02.     COLONOSCOPY  12/29/2011    Diverticuli, 3 mm tissue biopsied, path showed no hyperplastic or adenomatous elements     CYSTOSCOPY       ENT SURGERY       GENITOURINARY SURGERY       HEAD & NECK SURGERY       HERNIORRHAPHY UMBILICAL N/A 9/21/2017    Procedure: HERNIORRHAPHY UMBILICAL;  open incarcerated ventral hernia repair with mesh;  Surgeon: Cristina Robbins MD;  Location: [facility] OR     IRRIGATION AND DEBRIDEMENT HIP, COMBINED Left 11/7/2015    Procedure: COMBINED  IRRIGATION AND DEBRIDEMENT HIP;  Surgeon: Stanislav Maharaj MD;  Location: [facility] OR     left pelvis hip repaired (after 8 foot fall)       ORTHOPEDIC SURGERY       PHACOEMULSIFICATION CLEAR CORNEA WITH TORIC INTRAOCULAR LENS IMPLANT  2012    Procedure:PHACOEMULSIFICATION CLEAR CORNEA WITH TORIC INTRAOCULAR LENS IMPLANT; RIGHT PHACOEMULSIFICATION CLEAR CORNEA WITH  DELUXE TORIC INTRAOCULAR LENS IMPLANT; Surgeon:ANDRA LAGUNAS; [facility]     PHACOEMULSIFICATION CLEAR CORNEA WITH TORIC INTRAOCULAR LENS IMPLANT  2012    Procedure:PHACOEMULSIFICATION CLEAR CORNEA WITH TORIC INTRAOCULAR LENS IMPLANT; LEFT PHACOEMULSIFICATION CLEAR CORNEA WITH TORIC INTRAOCULAR LENS IMPLANT ; Surgeon:ANDRA LAGUNAS; Location:[facility]     PROSTATE SURGERY  2010     adwoa placed for left arm fracture       VITRECTOMY PARSPLANA WITH 23 GAUGE SYSTEM  2013    Procedure: VITRECTOMY PARSPLANA WITH 23 GAUGE SYSTEM;  LEFT 23 GAUGE VITRECTOMY, EPIRETINAL MEMBRANE REMOVAL, AIR FLUID EXCHANGE ;  Surgeon: Lawson Celeste MD;  [facility]     VITRECTOMY PARSPLANA WITH 23 GAUGE SYSTEM  2014    Procedure: VITRECTOMY PARSPLANA WITH 23 GAUGE SYSTEM;  RIGHT VITRECTOMY PARSPLANA WITH 23 GAUGE SYSTEM, EPIRETINAL MEMBRANE REMOVAL, AIR/FLUID EXCHANGE ;  Surgeon: Lawson Celeste MD;  Location: [facility]       FAMILY HISTORY:   Family History   Problem Relation Age of Onset     Neurologic Disorder Father       age 70, ALS, laryngeal CA     C.A.D. Father      HEART DISEASE Father      Hyperlipidemia Father      Hypertension Father      Family History Negative Mother       age 99 after hip fx, was almost 100     Hypertension Mother      Hyperlipidemia Mother      HEART DISEASE Sister      Born 1931     Coronary Artery Disease Sister      Hypertension Sister      Arthritis Sister      Born 1933     Hypertension Daughter        SOCIAL HISTORY:   Social History   Substance Use Topics     Smoking status: Former Smoker     Packs/day: 1.00  "    Years: 10.00     Types: Cigarettes, Pipe     Quit date: 1/1/1972     Smokeless tobacco: Never Used     Alcohol use Yes      Comment: occ wine/beer       Current Outpatient Prescriptions   Medication     triamterene-hydrochlorothiazide (MAXZIDE-25) 37.5-25 MG per tablet     simvastatin (ZOCOR) 20 MG tablet     cetirizine (ZYRTEC) 10 MG tablet     amLODIPine (NORVASC) 5 MG tablet     docusate calcium (SURFAK) 240 MG capsule     naproxen sodium (ALEVE) 220 MG tablet     InFLIXimab (REMICADE IV)     methotrexate 2.5 MG tablet     folic acid (FOLVITE) 1 MG tablet     Multiple Vitamins-Minerals (PRESERVISION AREDS PO)     aspirin 81 MG tablet     acetaminophen (TYLENOL) 325 MG tablet     oxyCODONE (ROXICODONE) 5 MG IR tablet     senna-docusate (SENOKOT-S;PERICOLACE) 8.6-50 MG per tablet     clotrimazole (LOTRIMIN) 1 % cream     oxyCODONE (ROXICODONE) 5 MG IR tablet     Menthol, Topical Analgesic, 2 % GEL     No current facility-administered medications for this visit.          PHYSICAL EXAM:    Pulse 80  Ht 1.74 m (5' 8.5\")  Wt 98.9 kg (218 lb)  BMI 32.66 kg/m2    HEENT: Normocephalic and atraumatic   Cardiac: Not done  Back/Flank: Not done  CNS/PNS: Not done  Respiratory: Normal non-labored breathing  Abdomen: Soft nontender and nondistended  Peripheral Vascular: Not done  Mental Status: Not done    Penis: Not done  Scrotal Skin: Not done  Testicles: Not done  Epididymis: Not done  Digital Rectal Exam:     Cystoscopy: Not done    Imaging: None    Urinalysis: UA RESULTS:  Recent Labs   Lab Test  09/16/17   2143  12/08/16   1530   COLOR  Straw  Yellow   APPEARANCE  Clear  Clear   URINEGLC  Negative  Negative   URINEBILI  Negative  Negative   URINEKETONE  Negative  Negative   SG  1.014  1.020   UBLD  Negative  Negative   URINEPH  6.0  6.5   PROTEIN  Negative  Negative   UROBILINOGEN   --   1.0   NITRITE  Negative  Negative   LEUKEST  Negative  Negative   RBCU  1  O - 2   WBCU  <1  O - 2       PSA: 0.56    Post Void " Residual: 160mL about 1 hour after urinating    Other labs: None today      IMPRESSION:  Prostate cancer with PSA recurrence    PLAN:  His PSA has recurred after cryotherapy for prostate cancer. He continues to rise. The PSA has a doubling time of about 6 months. We discussed his options. We discussed starting hormonal therapy. We discussed salvage radiotherapy. All of his questions were answered. I do think it is likely that he will need to start hormonal therapy at some point. For now be reasonable to follow the PSA very closely. I recommended that he come back again in 3 months. If the PSA continues to go up at that time we will need to begin hormonal therapy and we did discuss the side effects of that treatment.    Total Time: 15 minutes                                      Total in Consultation: 15 minutes      Again, thank you for allowing me to participate in the care of your patient.      Sincerely,    Bhavin Celeste MD

## 2018-01-22 NOTE — NURSING NOTE
Here for prostate cancer follow up. Is having some urge incontinence.  He  Is unable to void today for sample . State she voided about 40 min prior to scan which showed  160 cc .Rae Rosario LPN

## 2018-01-22 NOTE — PROGRESS NOTES
Office Visit Note  [facility]  [phone]    UROLOGIC DIAGNOSES:   [place] 4+4=8 prostate cancer    CURRENT INTERVENTIONS:   S/P cryoablation, on flomax and finasteride    HISTORY:   Raul returns to clinic today for prostate cancer follow-up. The PSA has risen a bit more, now at 0.56. He does have some occasional urinary incontinence but overall no bothersome urinary complaints.      PAST MEDICAL HISTORY:   Past Medical History:   Diagnosis Date     Arthritis      HTN      Malignant neoplasm (H) 01/2011    Prostate cancer     MI, old     vs. asthma per pt.'s wife     Migraine, unspecified, without mention of intractable migraine without mention of status migrainosus     Abstracted 5/15/02.     Other and unspecified hyperlipidemia     Abstracted 5/15/02.     Other and unspecified hyperlipidemia      Sleep apnea        PAST SURGICAL HISTORY:   Past Surgical History:   Procedure Laterality Date     ABDOMEN SURGERY  1980    diaphram repair     C NONSPECIFIC PROCEDURE  1996    Paraesophageal Hernia Repair. Abstracted 5/15/02.     C NONSPECIFIC PROCEDURE  1943    Tonsillectomy. Abstracted 5/15/02.     COLONOSCOPY  12/29/2011    Diverticuli, 3 mm tissue biopsied, path showed no hyperplastic or adenomatous elements     CYSTOSCOPY       ENT SURGERY       GENITOURINARY SURGERY       HEAD & NECK SURGERY       HERNIORRHAPHY UMBILICAL N/A 9/21/2017    Procedure: HERNIORRHAPHY UMBILICAL;  open incarcerated ventral hernia repair with mesh;  Surgeon: Cristina Robbins MD;  Location: [facility]     IRRIGATION AND DEBRIDEMENT HIP, COMBINED Left 11/7/2015    Procedure: COMBINED IRRIGATION AND DEBRIDEMENT HIP;  Surgeon: Stanislav Maharaj MD;  Location: [facility]     left pelvis hip repaired (after 8 foot fall)       ORTHOPEDIC SURGERY       PHACOEMULSIFICATION CLEAR CORNEA WITH TORIC INTRAOCULAR LENS IMPLANT  5/23/2012    Procedure:PHACOEMULSIFICATION CLEAR CORNEA WITH TORIC INTRAOCULAR LENS IMPLANT; RIGHT  PHACOEMULSIFICATION CLEAR CORNEA WITH  DELUXE TORIC INTRAOCULAR LENS IMPLANT; Surgeon:ANDRA LAGUNAS; Location:[facility]     PHACOEMULSIFICATION CLEAR CORNEA WITH TORIC INTRAOCULAR LENS IMPLANT  2012    Procedure:PHACOEMULSIFICATION CLEAR CORNEA WITH TORIC INTRAOCULAR LENS IMPLANT; LEFT PHACOEMULSIFICATION CLEAR CORNEA WITH TORIC INTRAOCULAR LENS IMPLANT ; Surgeon:ANDRA LAGUNAS; Location:[facility]     PROSTATE SURGERY  2010     adwoa placed for left arm fracture       VITRECTOMY PARSPLANA WITH 23 GAUGE SYSTEM  2013    Procedure: VITRECTOMY PARSPLANA WITH 23 GAUGE SYSTEM;  LEFT 23 GAUGE VITRECTOMY, EPIRETINAL MEMBRANE REMOVAL, AIR FLUID EXCHANGE ;  Surgeon: Lawson Celeste MD;  Location: [facility]     VITRECTOMY PARSPLANA WITH 23 GAUGE SYSTEM  2014    Procedure: VITRECTOMY PARSPLANA WITH 23 GAUGE SYSTEM;  RIGHT VITRECTOMY PARSPLANA WITH 23 GAUGE SYSTEM, EPIRETINAL MEMBRANE REMOVAL, AIR/FLUID EXCHANGE ;  Surgeon: Lawson Celeste MD;  Location: [facility]       FAMILY HISTORY:   Family History   Problem Relation Age of Onset     Neurologic Disorder Father       age 70, ALS, laryngeal CA     C.A.D. Father      HEART DISEASE Father      Hyperlipidemia Father      Hypertension Father      Family History Negative Mother       age 99 after hip fx, was almost 89     Hypertension Mother      Hyperlipidemia Mother      HEART DISEASE Sister      Born 1931     Coronary Artery Disease Sister      Hypertension Sister      Arthritis Sister      Born 1933     Hypertension Daughter        SOCIAL HISTORY:   Social History   Substance Use Topics     Smoking status: Former Smoker     Packs/day: 1.00     Years: 10.00     Types: Cigarettes, Pipe     Quit date: 1972     Smokeless tobacco: Never Used     Alcohol use Yes      Comment: occ wine/beer       Current Outpatient Prescriptions   Medication     triamterene-hydrochlorothiazide (MAXZIDE-25) 37.5-25 MG per tablet     simvastatin (ZOCOR) 20 MG tablet     cetirizine  "(ZYRTEC) 10 MG tablet     amLODIPine (NORVASC) 5 MG tablet     docusate calcium (SURFAK) 240 MG capsule     naproxen sodium (ALEVE) 220 MG tablet     InFLIXimab (REMICADE IV)     methotrexate 2.5 MG tablet     folic acid (FOLVITE) 1 MG tablet     Multiple Vitamins-Minerals (PRESERVISION AREDS PO)     aspirin 81 MG tablet     acetaminophen (TYLENOL) 325 MG tablet     oxyCODONE (ROXICODONE) 5 MG IR tablet     senna-docusate (SENOKOT-S;PERICOLACE) 8.6-50 MG per tablet     clotrimazole (LOTRIMIN) 1 % cream     oxyCODONE (ROXICODONE) 5 MG IR tablet     Menthol, Topical Analgesic, 2 % GEL     No current facility-administered medications for this visit.          PHYSICAL EXAM:    Pulse 80  Ht 1.74 m (5' 8.5\")  Wt 98.9 kg (218 lb)  BMI 32.66 kg/m2    HEENT: Normocephalic and atraumatic   Cardiac: Not done  Back/Flank: Not done  CNS/PNS: Not done  Respiratory: Normal non-labored breathing  Abdomen: Soft nontender and nondistended  Peripheral Vascular: Not done  Mental Status: Not done    Penis: Not done  Scrotal Skin: Not done  Testicles: Not done  Epididymis: Not done  Digital Rectal Exam:     Cystoscopy: Not done    Imaging: None    Urinalysis: UA RESULTS:  Recent Labs   Lab Test  09/16/17   2143  12/08/16   1530   COLOR  Straw  Yellow   APPEARANCE  Clear  Clear   URINEGLC  Negative  Negative   URINEBILI  Negative  Negative   URINEKETONE  Negative  Negative   SG  1.014  1.020   UBLD  Negative  Negative   URINEPH  6.0  6.5   PROTEIN  Negative  Negative   UROBILINOGEN   --   1.0   NITRITE  Negative  Negative   LEUKEST  Negative  Negative   RBCU  1  O - 2   WBCU  <1  O - 2       PSA: 0.56    Post Void Residual: 160mL about 1 hour after urinating    Other labs: None today      IMPRESSION:  Prostate cancer with PSA recurrence    PLAN:  His PSA has recurred after cryotherapy for prostate cancer. He continues to rise. The PSA has a doubling time of about 6 months. We discussed his options. We discussed starting hormonal " therapy. We discussed salvage radiotherapy. All of his questions were answered. I do think it is likely that he will need to start hormonal therapy at some point. For now be reasonable to follow the PSA very closely. I recommended that he come back again in 3 months. If the PSA continues to go up at that time we will need to begin hormonal therapy and we did discuss the side effects of that treatment.    Total Time: 15 minutes                                      Total in Consultation: 15 minutes      Bhavin Celeste M.D.

## 2018-01-22 NOTE — MR AVS SNAPSHOT
After Visit Summary   1/22/2018    Raul Younger    MRN: [id number]           Patient Information     Date Of Birth          1935        Visit Information        Provider Department      1/22/2018 11:30 AM Bhavin Celeste MD [facility] [place]        Today's Diagnoses     Urge incontinence    -  1    Malignant neoplasm of prostate (H)           Follow-ups after your visit        Follow-up notes from your care team     Return in about 3 months (around 4/22/2018) for PSA.      Your next 10 appointments already scheduled     Jul 16, 2018 11:30 AM CDT   Return Sleep Patient with Yoan Maria MD   [facility] ([facility])    [address] [address] [phone]              Future tests that were ordered for you today     Open Future Orders        Priority Expected Expires Ordered    PSA tumor marker Routine  1/22/2019 1/22/2018            Who to contact     If you have questions or need follow up information about today's clinic visit or your schedule please contact [facility] [place] directly at [phone].  Normal or non-critical lab and imaging results will be communicated to you by MyChart, letter or phone within 4 business days after the clinic has received the results. If you do not hear from us within 7 days, please contact the clinic through [employer] or phone. If you have a critical or abnormal lab result, we will notify you by phone as soon as possible.  Submit refill requests through [employer] or call your pharmacy and they will forward the refill request to us. Please allow 3 business days for your refill to be completed.          Additional Information About Your Visit        MyChart Information     [employer] gives you secure access to your electronic health record. If you see a primary care provider, you can also send messages to  "your care team and make appointments. If you have questions, please call your primary care clinic.  If you do not have a primary care provider, please call [phone] and they will assist you.        Care EveryWhere ID     This is your Care EveryWhere ID. This could be used by other organizations to access your [place] medical records  [id number]        Your Vitals Were     Pulse Height BMI (Body Mass Index)             80 1.74 m (5' 8.5\") 32.66 kg/m2          Blood Pressure from Last 3 Encounters:   09/21/17 134/72   09/19/17 124/80   09/18/17 122/76    Weight from Last 3 Encounters:   01/22/18 98.9 kg (218 lb)   10/10/17 97.5 kg (215 lb)   09/21/17 97.7 kg (215 lb 6.4 oz)              We Performed the Following     MEASURE POST-VOID RESIDUAL URINE/BLADDER CAPACITY, US NON-IMAGING (02227)        Primary Care Provider Office Phone # Fax #    Jsoe D Vasquez [phone]       [address] [place] [address]        Equal Access to Services     [facility] : Hadii aad ku hadasho Soomaali, waaxda luqadaha, qaybta kaalmada adeomariyada, wei harrison . So [facility] [phone].    ATENCIÓN: Si habla español, tiene a bautista disposición servicios gratuitos de asistencia lingüística. Ll[facility] [phone].    We comply with applicable federal civil rights laws and [place] laws. We do not discriminate on the basis of race, color, national origin, age, disability, sex, sexual orientation, or gender identity.            Thank you!     Thank you for choosing [facility] [place]  for your care. Our goal is always to provide you with excellent care. Hearing back from our patients is one way we can continue to improve our services. Please take a few minutes to complete the written survey that you may receive in the mail after your visit with us. Thank you!             Your Updated Medication List - Protect others around you: Learn how to " safely use, store and throw away your medicines at www.disposemymeds.org.          This list is accurate as of: 1/22/18 11:52 AM.  Always use your most recent med list.                   Brand Name Dispense Instructions for use Diagnosis    acetaminophen 325 MG tablet    TYLENOL    100 tablet    Take 2 tablets (650 mg) by mouth every 4 hours as needed for other (mild pain)    Ventral hernia without obstruction or gangrene       ALEVE 220 MG tablet   Generic drug:  naproxen sodium      Take by mouth as needed for moderate pain Reported on 3/22/2017        amLODIPine 5 MG tablet    NORVASC    180 tablet    Take 0.5 tablets (2.5 mg) by mouth daily    Essential hypertension, benign       aspirin 81 MG tablet      Take 1 tablet by mouth daily.        cetirizine 10 MG tablet    zyrTEC     Take 10 mg by mouth daily        clotrimazole 1 % cream    LOTRIMIN     Apply topically 2 times daily as needed        docusate calcium 240 MG capsule    SURFAK     Take 240 mg by mouth daily as needed        folic acid 1 MG tablet    FOLVITE     Take 1 mg by mouth daily        Menthol (Topical Analgesic) 2 % Gel      daily as needed        methotrexate 2.5 MG tablet CHEMO      Take 25 mg by mouth once a week On Thursdays        * oxyCODONE IR 5 MG tablet    ROXICODONE    12 tablet    Take 1 tablet (5 mg) by mouth every 6 hours as needed for pain        * oxyCODONE IR 5 MG tablet    ROXICODONE    20 tablet    Take 1-2 tablets (5-10 mg) by mouth every 3 hours as needed for pain or other (Moderate to Severe)    Ventral hernia without obstruction or gangrene       PRESERVISION AREDS PO      Take 1 tablet by mouth every morning        REMICADE IV      Infusion every 7 weeks        senna-docusate 8.6-50 MG per tablet    SENOKOT-S;PERICOLACE    30 tablet    Take 1-2 tablets by mouth 2 times daily Take while on oral narcotics to prevent or treat constipation.    Ventral hernia without obstruction or gangrene       simvastatin 20 MG tablet     ZOCOR    90 tablet    Take 1 tablet (20 mg) by mouth At Bedtime    Mixed hyperlipidemia       triamterene-hydrochlorothiazide 37.5-25 MG per tablet    MAXZIDE-25    180 tablet    TAKE 2 TABLETS EVERY DAY    Essential hypertension, benign, Edema, unspecified type       * Notice:  This list has 2 medication(s) that are the same as other medications prescribed for you. Read the directions carefully, and ask your doctor or other care provider to review them with you.

## 2018-03-16 ENCOUNTER — TRANSFERRED RECORDS (OUTPATIENT)
Dept: HEALTH INFORMATION MANAGEMENT | Facility: CLINIC | Age: 83
End: 2018-03-16

## 2018-04-03 DIAGNOSIS — E78.2 MIXED HYPERLIPIDEMIA: ICD-10-CM

## 2018-04-03 DIAGNOSIS — Z00.00 ROUTINE GENERAL MEDICAL EXAMINATION AT A HEALTH CARE FACILITY: Primary | ICD-10-CM

## 2018-04-05 RX ORDER — SIMVASTATIN 20 MG
20 TABLET ORAL AT BEDTIME
Qty: 90 TABLET | Refills: 0 | Status: SHIPPED | OUTPATIENT
Start: 2018-04-05 | End: 2018-06-08

## 2018-04-05 NOTE — TELEPHONE ENCOUNTER
"Requested Prescriptions   Pending Prescriptions Disp Refills     simvastatin (ZOCOR) 20 MG tablet [Pharmacy Med Name: SIMVASTATIN 20 MG Tablet]  Last Written Prescription Date:  10/5/17  Last Fill Quantity: 90,  # refills: 1   Last office visit: 9/19/2017    Future Office Visit:    90 tablet 1     Sig: TAKE 1 TABLET AT BEDTIME (NEED MD APPOINTMENT)    Statins Protocol Failed    4/3/2018  3:31 PM       Failed - LDL on file in past 12 months    Recent Labs   Lab Test  03/22/17   0958   LDL  68            Failed - Recent (12 mo) or future (30 days) visit within the authorizing provider's specialty    Patient had office visit in the last 12 months or has a visit in the next 30 days with authorizing provider or within the authorizing provider's specialty.  See \"Patient Info\" tab in inbasket, or \"Choose Columns\" in Meds & Orders section of the refill encounter.           Passed - No abnormal creatine kinase in past 12 months    No lab results found.            Passed - Patient is age 18 or older     Pt is due for annual physical and labs. Contacted pt. Appt scheduled for 6/8/18 with labs on 5/31/18.   Lab orders placed for CMP, CBC, TSH and Lipid panel (PSA ordered by Dr. Celeste).    Medication is being filled for 1 time refill only due to:  future appt scheduled         "

## 2018-04-19 DIAGNOSIS — C61 MALIGNANT NEOPLASM OF PROSTATE (H): ICD-10-CM

## 2018-04-19 PROCEDURE — 84153 ASSAY OF PSA TOTAL: CPT | Performed by: UROLOGY

## 2018-04-19 PROCEDURE — 36415 COLL VENOUS BLD VENIPUNCTURE: CPT | Performed by: UROLOGY

## 2018-04-20 LAB — PSA SERPL-MCNC: 0.57 UG/L (ref 0–4)

## 2018-04-23 ENCOUNTER — OFFICE VISIT (OUTPATIENT)
Dept: UROLOGY | Facility: CLINIC | Age: 83
End: 2018-04-23
Payer: COMMERCIAL

## 2018-04-23 VITALS — HEART RATE: 64 BPM | WEIGHT: 218 LBS | BODY MASS INDEX: 32.29 KG/M2 | HEIGHT: 69 IN | OXYGEN SATURATION: 96 %

## 2018-04-23 DIAGNOSIS — C61 MALIGNANT NEOPLASM OF PROSTATE (H): Primary | ICD-10-CM

## 2018-04-23 PROCEDURE — 99213 OFFICE O/P EST LOW 20 MIN: CPT | Performed by: UROLOGY

## 2018-04-23 ASSESSMENT — PAIN SCALES - GENERAL: PAINLEVEL: NO PAIN (1)

## 2018-04-23 NOTE — MR AVS SNAPSHOT
After Visit Summary   4/23/2018    Raul Younger    MRN: [id number]           Patient Information     Date Of Birth          1935        Visit Information        Provider Department      4/23/2018 11:00 AM Bhavin Celeste MD [facility]        Today's Diagnoses     Malignant neoplasm of prostate (H)    -  1       Follow-ups after your visit        Follow-up notes from your care team     Return in about 6 months (around 10/23/2018) for PSA.      Your next 10 appointments already scheduled     May 31, 2018 10:00 AM CDT   LAB with [facility]   [facility]    [address]  [facility] [address]   [phone]           Please do not eat 10-12 hours before your appointment if you are coming in fasting for labs on lipids, cholesterol, or glucose (sugar). This does not apply to pregnant women. Water, hot tea and black coffee (with nothing added) are okay. Do not drink other fluids, diet soda or chew gum.            Jun 08, 2018  1:00 PM CDT   PHYSICAL with Jose D Vasquez MD   [facility]    [address]  [facility] [address]   [phone]            Jul 16, 2018 11:30 AM CDT   Return Sleep Patient with Yoan Maria MD   [facility] ([facility])    [address] [place] [address]   [phone]              Future tests that were ordered for you today     Open Future Orders        Priority Expected Expires Ordered    PSA tumor marker Routine  4/23/2019 4/23/2018            Who to contact     If you have questions or need follow up information about today's clinic visit or your schedule please contact [facility] directly at [phone].  Normal or non-critical lab and imaging results will be communicated to you by M[employer]  "letter or phone within 4 business days after the clinic has received the results. If you do not hear from us within 7 days, please contact the clinic through [employer] or phone. If you have a critical or abnormal lab result, we will notify you by phone as soon as possible.  Submit refill requests through [employer] or call your pharmacy and they will forward the refill request to us. Please allow 3 business days for your refill to be completed.          Additional Information About Your Visit        [employer] Information     [employer] gives you secure access to your electronic health record. If you see a primary care provider, you can also send messages to your care team and make appointments. If you have questions, please call your primary care clinic.  If you do not have a primary care provider, please call [phone] and they will assist you.        Care EveryWhere ID     This is your Care EveryWhere ID. This could be used by other organizations to access your [place] medical records  [id number]        Your Vitals Were     Pulse Height Pulse Oximetry BMI (Body Mass Index)          64 1.753 m (5' 9\") 96% 32.19 kg/m2         Blood Pressure from Last 3 Encounters:   09/21/17 134/72   09/19/17 124/80   09/18/17 122/76    Weight from Last 3 Encounters:   04/23/18 98.9 kg (218 lb)   01/22/18 98.9 kg (218 lb)   10/10/17 97.5 kg (215 lb)               Primary Care Provider Office Phone # Fax #    Jose D Vasquez [phone]       [address] [facility] [address]  [place] [id number]        Equal Access to Services     BEKAH TELLEZ : Hadii aad ku hadasho Soomaali, waaxda luqadaha, qaybta kaalmada adekenia, wei harrison . So [facility] [phone].    ATENCIÓN: Si habla español, tiene a bautista disposición servicios gratuitos de asistencia lingüística. Llame al [phone].    We comply with applicable federal civil rights laws and [place] laws. We do not discriminate on the basis of race, color, " national origin, age, disability, sex, sexual orientation, or gender identity.            Thank you!     Thank you for choosing [facility] [place]  for your care. Our goal is always to provide you with excellent care. Hearing back from our patients is one way we can continue to improve our services. Please take a few minutes to complete the written survey that you may receive in the mail after your visit with us. Thank you!             Your Updated Medication List - Protect others around you: Learn how to safely use, store and throw away your medicines at www.disposemymeds.org.          This list is accurate as of 4/23/18 11:19 AM.  Always use your most recent med list.                   Brand Name Dispense Instructions for use Diagnosis    acetaminophen 325 MG tablet    TYLENOL    100 tablet    Take 2 tablets (650 mg) by mouth every 4 hours as needed for other (mild pain)    Ventral hernia without obstruction or gangrene       ALEVE 220 MG tablet   Generic drug:  naproxen sodium      Take by mouth as needed for moderate pain Reported on 3/22/2017        amLODIPine 5 MG tablet    NORVASC    180 tablet    Take 0.5 tablets (2.5 mg) by mouth daily    Essential hypertension, benign       aspirin 81 MG tablet      Take 1 tablet by mouth daily.        cetirizine 10 MG tablet    zyrTEC     Take 10 mg by mouth daily        clotrimazole 1 % cream    LOTRIMIN     Apply topically 2 times daily as needed        docusate calcium 240 MG capsule    SURFAK     Take 240 mg by mouth daily as needed        folic acid 1 MG tablet    FOLVITE     Take 1 mg by mouth daily        Menthol (Topical Analgesic) 2 % Gel      daily as needed        methotrexate 2.5 MG tablet CHEMO      Take 25 mg by mouth once a week On Thursdays        * oxyCODONE IR 5 MG tablet    ROXICODONE    12 tablet    Take 1 tablet (5 mg) by mouth every 6 hours as needed for pain        * oxyCODONE IR 5 MG tablet    ROXICODONE    20  tablet    Take 1-2 tablets (5-10 mg) by mouth every 3 hours as needed for pain or other (Moderate to Severe)    Ventral hernia without obstruction or gangrene       PRESERVISION AREDS PO      Take 1 tablet by mouth every morning        REMICADE IV      Infusion every 7 weeks        senna-docusate 8.6-50 MG per tablet    SENOKOT-S;PERICOLACE    30 tablet    Take 1-2 tablets by mouth 2 times daily Take while on oral narcotics to prevent or treat constipation.    Ventral hernia without obstruction or gangrene       simvastatin 20 MG tablet    ZOCOR    90 tablet    Take 1 tablet (20 mg) by mouth At Bedtime    Mixed hyperlipidemia       triamterene-hydrochlorothiazide 37.5-25 MG per tablet    MAXZIDE-25    180 tablet    TAKE 2 TABLETS EVERY DAY    Essential hypertension, benign, Edema, unspecified type       * Notice:  This list has 2 medication(s) that are the same as other medications prescribed for you. Read the directions carefully, and ask your doctor or other care provider to review them with you.

## 2018-04-23 NOTE — NURSING NOTE
Pt has pain in lower back when he moves.  Started today.  Pt denies any voiding trouble.  MIGUEL Archibald, CMA

## 2018-04-23 NOTE — PROGRESS NOTES
Office Visit Note  [facility]  [phone]    UROLOGIC DIAGNOSES:   Orient 4+4 = 8 prostate cancer with PSA recurrence    CURRENT INTERVENTIONS:   S/P cryoablation of the prostate    HISTORY:   Raul returns to clinic today for PSA recheck. Over the past 3 months his PSA has remained stable. It remains 0.57.      PAST MEDICAL HISTORY:   Past Medical History:   Diagnosis Date     Arthritis      HTN      Malignant neoplasm (H) 01/2011    Prostate cancer     MI, old     vs. asthma per pt.'s wife     Migraine, unspecified, without mention of intractable migraine without mention of status migrainosus     Abstracted 5/15/02.     Other and unspecified hyperlipidemia     Abstracted 5/15/02.     Other and unspecified hyperlipidemia      Sleep apnea        PAST SURGICAL HISTORY:   Past Surgical History:   Procedure Laterality Date     ABDOMEN SURGERY  1980    diaphram repair     C NONSPECIFIC PROCEDURE  1996    Paraesophageal Hernia Repair. Abstracted 5/15/02.     C NONSPECIFIC PROCEDURE  1943    Tonsillectomy. Abstracted 5/15/02.     COLONOSCOPY  12/29/2011    Diverticuli, 3 mm tissue biopsied, path showed no hyperplastic or adenomatous elements     CYSTOSCOPY       ENT SURGERY       GENITOURINARY SURGERY       HEAD & NECK SURGERY       HERNIORRHAPHY UMBILICAL N/A 9/21/2017    Procedure: HERNIORRHAPHY UMBILICAL;  open incarcerated ventral hernia repair with mesh;  Surgeon: Cristina Robbins MD;  Location: [facility] OR     IRRIGATION AND DEBRIDEMENT HIP, COMBINED Left 11/7/2015    Procedure: COMBINED IRRIGATION AND DEBRIDEMENT HIP;  Surgeon: Stanislav Maharaj MD;  Location: [facility] OR     left pelvis hip repaired (after 8 foot fall)       ORTHOPEDIC SURGERY       PHACOEMULSIFICATION CLEAR CORNEA WITH TORIC INTRAOCULAR LENS IMPLANT  5/23/2012    Procedure:PHACOEMULSIFICATION CLEAR CORNEA WITH TORIC INTRAOCULAR LENS IMPLANT; RIGHT PHACOEMULSIFICATION CLEAR CORNEA WITH  DELUXE TORIC INTRAOCULAR LENS IMPLANT;  Surgeon:ANDRA LAGUNAS; Location:[facility]     PHACOEMULSIFICATION CLEAR CORNEA WITH TORIC INTRAOCULAR LENS IMPLANT  2012    Procedure:PHACOEMULSIFICATION CLEAR CORNEA WITH TORIC INTRAOCULAR LENS IMPLANT; LEFT PHACOEMULSIFICATION CLEAR CORNEA WITH TORIC INTRAOCULAR LENS IMPLANT ; Surgeon:ANDRA LAGUNAS; Location: [facility]     PROSTATE SURGERY  2010     adwoa placed for left arm fracture       VITRECTOMY PARSPLANA WITH 23 GAUGE SYSTEM  2013    Procedure: VITRECTOMY PARSPLANA WITH 23 GAUGE SYSTEM;  LEFT 23 GAUGE VITRECTOMY, EPIRETINAL MEMBRANE REMOVAL, AIR FLUID EXCHANGE ;  Surgeon: Lawson Celeste MD;  Location:  [facility]     VITRECTOMY PARSPLANA WITH 23 GAUGE SYSTEM  2014    Procedure: VITRECTOMY PARSPLANA WITH 23 GAUGE SYSTEM;  RIGHT VITRECTOMY PARSPLANA WITH 23 GAUGE SYSTEM, EPIRETINAL MEMBRANE REMOVAL, AIR/FLUID EXCHANGE ;  Surgeon: Lawson Celeste MD;  Location: [facility]       FAMILY HISTORY:   Family History   Problem Relation Age of Onset     Neurologic Disorder Father       age 70, ALS, laryngeal CA     C.A.D. Father      HEART DISEASE Father      Hyperlipidemia Father      Hypertension Father      Family History Negative Mother       age 99 after hip fx, was almost 100     Hypertension Mother      Hyperlipidemia Mother      HEART DISEASE Sister      Born 1931     Coronary Artery Disease Sister      Hypertension Sister      Arthritis Sister      Born 1933     Hypertension Daughter        SOCIAL HISTORY:   Social History   Substance Use Topics     Smoking status: Former Smoker     Packs/day: 1.00     Years: 10.00     Types: Cigarettes, Pipe     Quit date: 1972     Smokeless tobacco: Never Used     Alcohol use Yes      Comment: occ wine/beer       Current Outpatient Prescriptions   Medication     amLODIPine (NORVASC) 5 MG tablet     aspirin 81 MG tablet     folic acid (FOLVITE) 1 MG tablet     InFLIXimab (REMICADE IV)     methotrexate 2.5 MG tablet     Multiple Vitamins-Minerals (PRESERVISION  "AREDS PO)     naproxen sodium (ALEVE) 220 MG tablet     simvastatin (ZOCOR) 20 MG tablet     triamterene-hydrochlorothiazide (MAXZIDE-25) 37.5-25 MG per tablet     acetaminophen (TYLENOL) 325 MG tablet     cetirizine (ZYRTEC) 10 MG tablet     clotrimazole (LOTRIMIN) 1 % cream     docusate calcium (SURFAK) 240 MG capsule     Menthol, Topical Analgesic, 2 % GEL     oxyCODONE (ROXICODONE) 5 MG IR tablet     oxyCODONE (ROXICODONE) 5 MG IR tablet     senna-docusate (SENOKOT-S;PERICOLACE) 8.6-50 MG per tablet     No current facility-administered medications for this visit.          PHYSICAL EXAM:    Pulse 64  Ht 1.753 m (5' 9\")  Wt 98.9 kg (218 lb)  SpO2 96%  BMI 32.19 kg/m2    HEENT: Normocephalic and atraumatic   Cardiac: Not done  Back/Flank: Not done  CNS/PNS: Not done  Respiratory: Normal non-labored breathing  Abdomen: Soft nontender and nondistended  Peripheral Vascular: Not done  Mental Status: Not done    Penis: Not done  Scrotal Skin: Not done  Testicles: Not done  Epididymis: Not done  Digital Rectal Exam:     Cystoscopy: Not done    Imaging: None    Urinalysis: UA RESULTS:  Recent Labs   Lab Test  09/16/17   2143  12/08/16   1530   COLOR  Straw  Yellow   APPEARANCE  Clear  Clear   URINEGLC  Negative  Negative   URINEBILI  Negative  Negative   URINEKETONE  Negative  Negative   SG  1.014  1.020   UBLD  Negative  Negative   URINEPH  6.0  6.5   PROTEIN  Negative  Negative   UROBILINOGEN   --   1.0   NITRITE  Negative  Negative   LEUKEST  Negative  Negative   RBCU  1  O - 2   WBCU  <1  O - 2       PSA: 0.57    Post Void Residual:     Other labs: None today      IMPRESSION:  Prostate cancer with PSA recurrence    PLAN:  His PSA has remained stable over the past 3 months. We do not need to initiate any further treatment at this point but do need to continue to follow this closely. I will see him back in 6 months for another PSA check.    Total Time: 15 minutes                                      Total in " Consultation: 15 minutes      Bhavin Celeste M.D.

## 2018-04-23 NOTE — LETTER
4/23/2018       RE: Raul Younger  [address] [address]  [address]  [facility] [place] [facility] [address]     Dear Colleague,    Thank you for referring your patient, Raul Younger, to the [facility] [place] at [facility]. Please see a copy of my visit note below.    Office Visit Note  [facility]  [phone]    UROLOGIC DIAGNOSES:   Melia 4+4 = 8 prostate cancer with PSA recurrence    CURRENT INTERVENTIONS:   S/P cryoablation of the prostate    HISTORY:   Raul returns to clinic today for PSA recheck. Over the past 3 months his PSA has remained stable. It remains 0.57.      PAST MEDICAL HISTORY:   Past Medical History:   Diagnosis Date     Arthritis      HTN      Malignant neoplasm (H) 01/2011    Prostate cancer     MI, old     vs. asthma per pt.'s wife     Migraine, unspecified, without mention of intractable migraine without mention of status migrainosus     Abstracted 5/15/02.     Other and unspecified hyperlipidemia     Abstracted 5/15/02.     Other and unspecified hyperlipidemia      Sleep apnea        PAST SURGICAL HISTORY:   Past Surgical History:   Procedure Laterality Date     ABDOMEN SURGERY  1980    diaphram repair     C NONSPECIFIC PROCEDURE  1996    Paraesophageal Hernia Repair. Abstracted 5/15/02.     C NONSPECIFIC PROCEDURE  1943    Tonsillectomy. Abstracted 5/15/02.     COLONOSCOPY  12/29/2011    Diverticuli, 3 mm tissue biopsied, path showed no hyperplastic or adenomatous elements     CYSTOSCOPY       ENT SURGERY       GENITOURINARY SURGERY       HEAD & NECK SURGERY       HERNIORRHAPHY UMBILICAL N/A 9/21/2017    Procedure: HERNIORRHAPHY UMBILICAL;  open incarcerated ventral hernia repair with mesh;  Surgeon: Cristina Robbins MD;  Location: [facility] OR     IRRIGATION AND DEBRIDEMENT HIP, COMBINED Left 11/7/2015    Procedure: COMBINED IRRIGATION AND DEBRIDEMENT HIP;  Surgeon: Stanislav Maharaj MD;  Location:  OR      left pelvis hip repaired (after 8 foot fall)       ORTHOPEDIC SURGERY       PHACOEMULSIFICATION CLEAR CORNEA WITH TORIC INTRAOCULAR LENS IMPLANT  2012    Procedure:PHACOEMULSIFICATION CLEAR CORNEA WITH TORIC INTRAOCULAR LENS IMPLANT; RIGHT PHACOEMULSIFICATION CLEAR CORNEA WITH  DELUXE TORIC INTRAOCULAR LENS IMPLANT; Surgeon:ANDRA LAGUNAS; Location:[facility]     PHACOEMULSIFICATION CLEAR CORNEA WITH TORIC INTRAOCULAR LENS IMPLANT  2012    Procedure:PHACOEMULSIFICATION CLEAR CORNEA WITH TORIC INTRAOCULAR LENS IMPLANT; LEFT PHACOEMULSIFICATION CLEAR CORNEA WITH TORIC INTRAOCULAR LENS IMPLANT ; Surgeon:ANDRA LAGUNAS; Location:[facility]     PROSTATE SURGERY  2010     adwoa placed for left arm fracture       VITRECTOMY PARSPLANA WITH 23 GAUGE SYSTEM  2013    Procedure: VITRECTOMY PARSPLANA WITH 23 GAUGE SYSTEM;  LEFT 23 GAUGE VITRECTOMY, EPIRETINAL MEMBRANE REMOVAL, AIR FLUID EXCHANGE ;  Surgeon: Lawson Celeste MD;  Location: [facility]     VITRECTOMY PARSPLANA WITH 23 GAUGE SYSTEM  2014    Procedure: VITRECTOMY PARSPLANA WITH 23 GAUGE SYSTEM;  RIGHT VITRECTOMY PARSPLANA WITH 23 GAUGE SYSTEM, EPIRETINAL MEMBRANE REMOVAL, AIR/FLUID EXCHANGE ;  Surgeon: Lawson Celeste MD;  Location: [facility]       FAMILY HISTORY:   Family History   Problem Relation Age of Onset     Neurologic Disorder Father       age 70, ALS, laryngeal CA     C.A.D. Father      HEART DISEASE Father      Hyperlipidemia Father      Hypertension Father      Family History Negative Mother       age 99 after hip fx, was almost 100     Hypertension Mother      Hyperlipidemia Mother      HEART DISEASE Sister      Born 1931     Coronary Artery Disease Sister      Hypertension Sister      Arthritis Sister      Born 1933     Hypertension Daughter        SOCIAL HISTORY:   Social History   Substance Use Topics     Smoking status: Former Smoker     Packs/day: 1.00     Years: 10.00     Types: Cigarettes, Pipe     Quit date: 1972      "Smokeless tobacco: Never Used     Alcohol use Yes      Comment: occ wine/beer       Current Outpatient Prescriptions   Medication     amLODIPine (NORVASC) 5 MG tablet     aspirin 81 MG tablet     folic acid (FOLVITE) 1 MG tablet     InFLIXimab (REMICADE IV)     methotrexate 2.5 MG tablet     Multiple Vitamins-Minerals (PRESERVISION AREDS PO)     naproxen sodium (ALEVE) 220 MG tablet     simvastatin (ZOCOR) 20 MG tablet     triamterene-hydrochlorothiazide (MAXZIDE-25) 37.5-25 MG per tablet     acetaminophen (TYLENOL) 325 MG tablet     cetirizine (ZYRTEC) 10 MG tablet     clotrimazole (LOTRIMIN) 1 % cream     docusate calcium (SURFAK) 240 MG capsule     Menthol, Topical Analgesic, 2 % GEL     oxyCODONE (ROXICODONE) 5 MG IR tablet     oxyCODONE (ROXICODONE) 5 MG IR tablet     senna-docusate (SENOKOT-S;PERICOLACE) 8.6-50 MG per tablet     No current facility-administered medications for this visit.          PHYSICAL EXAM:    Pulse 64  Ht 1.753 m (5' 9\")  Wt 98.9 kg (218 lb)  SpO2 96%  BMI 32.19 kg/m2    HEENT: Normocephalic and atraumatic   Cardiac: Not done  Back/Flank: Not done  CNS/PNS: Not done  Respiratory: Normal non-labored breathing  Abdomen: Soft nontender and nondistended  Peripheral Vascular: Not done  Mental Status: Not done    Penis: Not done  Scrotal Skin: Not done  Testicles: Not done  Epididymis: Not done  Digital Rectal Exam:     Cystoscopy: Not done    Imaging: None    Urinalysis: UA RESULTS:  Recent Labs   Lab Test  09/16/17   2143  12/08/16   1530   COLOR  Straw  Yellow   APPEARANCE  Clear  Clear   URINEGLC  Negative  Negative   URINEBILI  Negative  Negative   URINEKETONE  Negative  Negative   SG  1.014  1.020   UBLD  Negative  Negative   URINEPH  6.0  6.5   PROTEIN  Negative  Negative   UROBILINOGEN   --   1.0   NITRITE  Negative  Negative   LEUKEST  Negative  Negative   RBCU  1  O - 2   WBCU  <1  O - 2       PSA: 0.57    Post Void Residual:     Other labs: None today      IMPRESSION:  Prostate " cancer with PSA recurrence    PLAN:  His PSA has remained stable over the past 3 months. We do not need to initiate any further treatment at this point but do need to continue to follow this closely. I will see him back in 6 months for another PSA check.    Total Time: 15 minutes                                      Total in Consultation: 15 minutes      Again, thank you for allowing me to participate in the care of your patient.      Sincerely,    Bhavin Celeste MD

## 2018-05-31 DIAGNOSIS — Z00.00 ROUTINE GENERAL MEDICAL EXAMINATION AT A HEALTH CARE FACILITY: ICD-10-CM

## 2018-05-31 DIAGNOSIS — C61 MALIGNANT NEOPLASM OF PROSTATE (H): ICD-10-CM

## 2018-05-31 LAB
ERYTHROCYTE [DISTWIDTH] IN BLOOD BY AUTOMATED COUNT: 14.1 % (ref 10–15)
HCT VFR BLD AUTO: 37.2 % (ref 40–53)
HGB BLD-MCNC: 12.4 G/DL (ref 13.3–17.7)
MCH RBC QN AUTO: 30.8 PG (ref 26.5–33)
MCHC RBC AUTO-ENTMCNC: 33.3 G/DL (ref 31.5–36.5)
MCV RBC AUTO: 93 FL (ref 78–100)
PLATELET # BLD AUTO: 225 10E9/L (ref 150–450)
RBC # BLD AUTO: 4.02 10E12/L (ref 4.4–5.9)
WBC # BLD AUTO: 6.4 10E9/L (ref 4–11)

## 2018-05-31 PROCEDURE — 80053 COMPREHEN METABOLIC PANEL: CPT | Performed by: INTERNAL MEDICINE

## 2018-05-31 PROCEDURE — 80061 LIPID PANEL: CPT | Performed by: INTERNAL MEDICINE

## 2018-05-31 PROCEDURE — 84443 ASSAY THYROID STIM HORMONE: CPT | Performed by: INTERNAL MEDICINE

## 2018-05-31 PROCEDURE — 85027 COMPLETE CBC AUTOMATED: CPT | Performed by: INTERNAL MEDICINE

## 2018-05-31 PROCEDURE — 84153 ASSAY OF PSA TOTAL: CPT | Performed by: INTERNAL MEDICINE

## 2018-05-31 PROCEDURE — 36415 COLL VENOUS BLD VENIPUNCTURE: CPT | Performed by: INTERNAL MEDICINE

## 2018-06-01 LAB
ALBUMIN SERPL-MCNC: 3.8 G/DL (ref 3.4–5)
ALP SERPL-CCNC: 78 U/L (ref 40–150)
ALT SERPL W P-5'-P-CCNC: 17 U/L (ref 0–70)
ANION GAP SERPL CALCULATED.3IONS-SCNC: 11 MMOL/L (ref 3–14)
AST SERPL W P-5'-P-CCNC: 12 U/L (ref 0–45)
BILIRUB SERPL-MCNC: 0.6 MG/DL (ref 0.2–1.3)
BUN SERPL-MCNC: 22 MG/DL (ref 7–30)
CALCIUM SERPL-MCNC: 9 MG/DL (ref 8.5–10.1)
CHLORIDE SERPL-SCNC: 103 MMOL/L (ref 94–109)
CHOLEST SERPL-MCNC: 146 MG/DL
CO2 SERPL-SCNC: 22 MMOL/L (ref 20–32)
CREAT SERPL-MCNC: 1.13 MG/DL (ref 0.66–1.25)
GFR SERPL CREATININE-BSD FRML MDRD: 62 ML/MIN/1.7M2
GLUCOSE SERPL-MCNC: 87 MG/DL (ref 70–99)
HDLC SERPL-MCNC: 50 MG/DL
LDLC SERPL CALC-MCNC: 68 MG/DL
NONHDLC SERPL-MCNC: 96 MG/DL
POTASSIUM SERPL-SCNC: 3.8 MMOL/L (ref 3.4–5.3)
PROT SERPL-MCNC: 6.8 G/DL (ref 6.8–8.8)
PSA SERPL-MCNC: 0.56 UG/L (ref 0–4)
SODIUM SERPL-SCNC: 136 MMOL/L (ref 133–144)
TRIGL SERPL-MCNC: 139 MG/DL
TSH SERPL DL<=0.005 MIU/L-ACNC: 1.18 MU/L (ref 0.4–4)

## 2018-06-08 ENCOUNTER — OFFICE VISIT (OUTPATIENT)
Dept: INTERNAL MEDICINE | Facility: CLINIC | Age: 83
End: 2018-06-08
Payer: COMMERCIAL

## 2018-06-08 VITALS
SYSTOLIC BLOOD PRESSURE: 122 MMHG | BODY MASS INDEX: 33.33 KG/M2 | HEART RATE: 67 BPM | HEIGHT: 69 IN | OXYGEN SATURATION: 98 % | DIASTOLIC BLOOD PRESSURE: 66 MMHG | TEMPERATURE: 98.4 F | WEIGHT: 225 LBS | RESPIRATION RATE: 16 BRPM

## 2018-06-08 DIAGNOSIS — R60.9 EDEMA, UNSPECIFIED TYPE: ICD-10-CM

## 2018-06-08 DIAGNOSIS — E78.2 MIXED HYPERLIPIDEMIA: ICD-10-CM

## 2018-06-08 DIAGNOSIS — Z00.00 ROUTINE GENERAL MEDICAL EXAMINATION AT A HEALTH CARE FACILITY: Primary | ICD-10-CM

## 2018-06-08 DIAGNOSIS — I10 ESSENTIAL HYPERTENSION, BENIGN: ICD-10-CM

## 2018-06-08 PROCEDURE — 99397 PER PM REEVAL EST PAT 65+ YR: CPT | Performed by: INTERNAL MEDICINE

## 2018-06-08 RX ORDER — AMLODIPINE BESYLATE 2.5 MG/1
2.5 TABLET ORAL DAILY
Qty: 90 TABLET | Refills: 3 | Status: SHIPPED | OUTPATIENT
Start: 2018-06-08 | End: 2018-10-09

## 2018-06-08 RX ORDER — TRIAMTERENE/HYDROCHLOROTHIAZID 37.5-25 MG
1 TABLET ORAL DAILY
Qty: 90 TABLET | Refills: 3 | Status: SHIPPED | OUTPATIENT
Start: 2018-06-08 | End: 2019-10-07

## 2018-06-08 RX ORDER — SIMVASTATIN 20 MG
20 TABLET ORAL AT BEDTIME
Qty: 90 TABLET | Refills: 3 | Status: SHIPPED | OUTPATIENT
Start: 2018-06-08 | End: 2019-03-26

## 2018-06-08 ASSESSMENT — ACTIVITIES OF DAILY LIVING (ADL)
I_NEED_ASSISTANCE_FOR_THE_FOLLOWING_DAILY_ACTIVITIES:: NO ASSISTANCE IS NEEDED
CURRENT_FUNCTION: NO ASSISTANCE NEEDED

## 2018-06-08 NOTE — MR AVS SNAPSHOT
After Visit Summary   6/8/2018    Raul Younger    MRN: [id number]           Patient Information     Date Of Birth          1935        Visit Information        Provider Department      6/8/2018 1:00 PM Jose D Vasquez MD [facility]        Today's Diagnoses     Routine general medical examination at a health care facility    -  1    BENIGN HYPERTENSION        Edema, unspecified type        Mixed hyperlipidemia          Care Instructions      Preventive Health Recommendations:       Male Ages 65 and over    Yearly exam:             See your health care provider every year in order to  o   Review health changes.   o   Discuss preventive care.    o   Review your medicines if your doctor has prescribed any.    Talk with your health care provider about whether you should have a test to screen for prostate cancer (PSA).    Every 3 years, have a diabetes test (fasting glucose). If you are at risk for diabetes, you should have this test more often.    Every 5 years, have a cholesterol test. Have this test more often if you are at risk for high cholesterol or heart disease.     Every 10 years, have a colonoscopy. Or, have a yearly FIT test (stool test). These exams will check for colon cancer.    Talk to with your health care provider about screening for Abdominal Aortic Aneurysm if you have a family history of AAA or have a history of smoking.  Shots:     Get a flu shot each year.     Get a tetanus shot every 10 years.     Talk to your doctor about your pneumonia vaccines. There are now two you should receive - Pneumovax (PPSV 23) and Prevnar (PCV 13).    Talk to your doctor about a shingles vaccine.     Talk to your doctor about the hepatitis B vaccine.  Nutrition:     Eat at least 5 servings of fruits and vegetables each day.     Eat whole-grain bread, whole-wheat pasta and brown rice instead of white grains and rice.     Talk to your doctor about Calcium and Vitamin D.    Lifestyle    Exercise for at least 150 minutes a week (30 minutes a day, 5 days a week). This will help you control your weight and prevent disease.     Limit alcohol to one drink per day.     No smoking.     Wear sunscreen to prevent skin cancer.     See your dentist every six months for an exam and cleaning.     See your eye doctor every 1 to 2 years to screen for conditions such as glaucoma, macular degeneration and cataracts.        Everything looks fine!    Refills of medications have been faxed to your pharmacy.     I'll get back to you with lab results soon, especially if there is anything of concern.      See you in a year, sooner if problems.            Follow-ups after your visit        Your next 10 appointments already scheduled     Jul 16, 2018 11:30 AM CDT   Return Sleep Patient with Yoan Mraia MD   [facility] ([facility])    [address] [place] [address]  [facility] [address] [phone]            Oct 18, 2018 11:00 AM CDT   [facility] with [facility]   [facility]    [address]  [facility] [id number] [phone]           Please do not eat 10-12 hours before your appointment if you are coming in fasting for labs on lipids, cholesterol, or glucose (sugar). This does not apply to pregnant women. Water, hot tea and black coffee (with nothing added) are okay. Do not drink other fluids, diet soda or chew gum.            Oct 22, 2018 11:00 AM CDT   Return Visit with Bhavin Celeste MD   [facility] [place] ([facility])    [address] [facility]  [address]  [facility] [id number] [phone]              Who to contact     If you have questions or need follow up information about today's clinic visit or your schedule please contact [facility] directly at [phone].  Normal or non-critical lab and imaging results will be  "communicated to you by Echelonhart, letter or phone within 4 business days after the clinic has received the results. If you do not hear from us within 7 days, please contact the clinic through [employer] or phone. If you have a critical or abnormal lab result, we will notify you by phone as soon as possible.  Submit refill requests through [employer] or call your pharmacy and they will forward the refill request to us. Please allow 3 business days for your refill to be completed.          Additional Information About Your Visit        [employer] Information     [employer] gives you secure access to your electronic health record. If you see a primary care provider, you can also send messages to your care team and make appointments. If you have questions, please call your primary care clinic.  If you do not have a primary care provider, please call [phone] and they will assist you.        Care EveryWhere ID     This is your Care EveryWhere ID. This could be used by other organizations to access your [facility] medical records  [id number]        Your Vitals Were     Pulse Temperature Respirations Height Pulse Oximetry BMI (Body Mass Index)    67 98.4  F (36.9  C) (Oral) 16 5' 9\" (1.753 m) 98% 33.23 kg/m2       Blood Pressure from Last 3 Encounters:   06/08/18 122/66   09/21/17 134/72   09/19/17 124/80    Weight from Last 3 Encounters:   06/08/18 225 lb (102.1 kg)   04/23/18 218 lb (98.9 kg)   01/22/18 218 lb (98.9 kg)              Today, you had the following     No orders found for display         Today's Medication Changes          These changes are accurate as of 6/8/18  2:05 PM.  If you have any questions, ask your nurse or doctor.               These medicines have changed or have updated prescriptions.        Dose/Directions    amLODIPine 2.5 MG tablet   Commonly known as:  NORVASC   This may have changed:  medication strength   Used for:  Essential hypertension, benign   Changed by:  Jose D Vasquez MD        Dose:  2.5 " mg   Take 1 tablet (2.5 mg) by mouth daily   Quantity:  90 tablet   Refills:  3       triamterene-hydrochlorothiazide 37.5-25 MG per tablet   Commonly known as:  MAXZIDE-25   This may have changed:  See the new instructions.   Used for:  Essential hypertension, benign, Edema, unspecified type   Changed by:  Jose D Vasquez MD        Dose:  1 tablet   Take 1 tablet by mouth daily   Quantity:  90 tablet   Refills:  3            Where to get your medicines      These medications were sent to [facility] - [facility] [address]  [address], [facility] [address]     Phone:  [phone]     amLODIPine 2.5 MG tablet    simvastatin 20 MG tablet    triamterene-hydrochlorothiazide 37.5-25 MG per tablet                Primary Care Provider Office Phone # Fax #    Jose D Vasquez [phone]       [address] [place] [address]        Equal Access to Services     [facility] AH: Hadii simona ku hadasho Soomaali, waaxda luqadaha, qaybta kaalmada adeegyada, waxay idiin hayaan danielle kharash christy . So [facility] [phone].    ATENCIÓN: Si habla español, tiene a bautista disposición servicios gratuitos de asistencia lingüística. Ll[facility] [phone].    We comply with applicable federal civil rights laws and [place] laws. We do not discriminate on the basis of race, color, national origin, age, disability, sex, sexual orientation, or gender identity.            Thank you!     Thank you for choosing [facility]  for your care. Our goal is always to provide you with excellent care. Hearing back from our patients is one way we can continue to improve our services. Please take a few minutes to complete the written survey that you may receive in the mail after your visit with us. Thank you!             Your Updated Medication List - Protect others around you: Learn how to safely use, store and throw away your medicines at www.disposemymeds.org.          This  list is accurate as of 6/8/18  2:05 PM.  Always use your most recent med list.                   Brand Name Dispense Instructions for use Diagnosis    acetaminophen 325 MG tablet    TYLENOL    100 tablet    Take 2 tablets (650 mg) by mouth every 4 hours as needed for other (mild pain)    Ventral hernia without obstruction or gangrene       ALEVE 220 MG tablet   Generic drug:  naproxen sodium      Take by mouth as needed for moderate pain Reported on 3/22/2017        amLODIPine 2.5 MG tablet    NORVASC    90 tablet    Take 1 tablet (2.5 mg) by mouth daily    Essential hypertension, benign       aspirin 81 MG tablet      Take 1 tablet by mouth daily.        cetirizine 10 MG tablet    zyrTEC     Take 10 mg by mouth daily        clotrimazole 1 % cream    LOTRIMIN     Apply topically 2 times daily as needed        docusate calcium 240 MG capsule    SURFAK     Take 240 mg by mouth daily as needed        folic acid 1 MG tablet    FOLVITE     Take 1 mg by mouth daily        Menthol (Topical Analgesic) 2 % Gel      daily as needed        methotrexate 2.5 MG tablet CHEMO      Take 25 mg by mouth once a week On Thursdays        PRESERVISION AREDS PO      Take 1 tablet by mouth every morning        REMICADE IV      Infusion every 7 weeks        senna-docusate 8.6-50 MG per tablet    SENOKOT-S;PERICOLACE    30 tablet    Take 1-2 tablets by mouth 2 times daily Take while on oral narcotics to prevent or treat constipation.    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

## 2018-06-08 NOTE — PROGRESS NOTES
SUBJECTIVE:   Raul Younger is a 82 year old male who presents for Preventive Visit.    Are you in the first 12 months of your Medicare Part B coverage?  No    Healthy Habits:  Answers for HPI/ROS submitted by the patient on 6/8/2018   Annual Exam:  Getting at least 3 servings of Calcium per day:: NO  Bi-annual eye exam:: Yes  Dental care twice a year:: Yes  Sleep apnea or symptoms of sleep apnea:: Sleep apnea  Diet:: Regular (no restrictions)  Frequency of exercise:: 4-5 days/week  Taking medications regularly:: Yes  Medication side effects:: None, No significant flushing  Additional concerns today:: No  Activities of Daily Living: no assistance needed  Home safety: lack of grab bars in the bathroom  Hearing Impairment:: difficulty following a conversation in a noisy restaurant or crowded room, difficulty following dialogue in the theater  PHQ-2 Score: 0  Duration of exercise:: 15-30 minutes    Fall risk:completed     COGNITIVE SCREEN  1) Repeat 3 items (Banana, Sunrise, Chair)    2) Clock draw: NORMAL  3) 3 item recall: Recalls 2 objects   Results: NORMAL clock, 1-2 items recalled: COGNITIVE IMPAIRMENT LESS LIKELY    Mini-CogTM Copyright S Trever. Licensed by the author for use in [facility]; reprinted with permission (soayesha@[facility]). All rights reserved.      Past/recent records reviewed and discussed for:  -Reviewed recent labs from 5/31/2018   -He had a ventral hernia repair surgery on 9/21/2017.  -May experience occasional sneezing spells after eating.   -Note excessive urination due to excessive fluid consumption. He likes to drink flavored water. Recent blood glucose levels were normal.   -Taking a baby aspirin daily.   -Declined tetanus vaccine as Dr. Roach was closely monitoring which vaccines he may receive due to methotrexate usage.     Prostate cancer  Follows with Dr. Celeste twice annually.      Anemia  Recent hemoglobin levels were borderline low, 12.4 g/dL. Hemoglobin has  been historically borderline low.     RA  He was diagnosed with RA about 5 or 6 years ago. Follows up with Dr. Roach regularly--last seen on 4/3/2018. Currently on methotrexate and Remicade.     Derm  Patient follows up with Dr. Kassandra Arce of dermatology annually.     Hypertension  BP at target today, 122/66. Currently on amlodipine 2.5 mg daily, triamterene 37.5 mg, and HCTZ 25 mg twice daily.    Lower extremity edema  Notes that he was originally on HCTZ 25 mg daily but had the dosage increased due to lower extremity swelling. This has now improved.      Diet and exercise   He walks about 5 days a week and continues with structured exercises a few times a week.     Joint pain  Patient reports that 6 weeks ago he woke up with lower back pain. He took Aleve for two weeks which helped manage pain. He then also experienced pain above the right hip bone which took about 3 weeks to clear. No longer experiences any back or hip pain.     Reviewed and updated as needed this visit by clinical staff  Tobacco  Allergies  Meds  Med Hx  Surg Hx  Fam Hx  Soc Hx        Reviewed and updated as needed this visit by Provider        Social History   Substance Use Topics     Smoking status: Former Smoker     Packs/day: 1.00     Years: 10.00     Types: Cigarettes, Pipe     Quit date: 1/1/1972     Smokeless tobacco: Never Used     Alcohol use Yes      Comment: occ wine/beer     If you drink alcohol do you typically have >3 drinks per day or >7 drinks per week? No                        Today's PHQ-2 Score:   PHQ-2 ( 1999 [employer]) 6/8/2018 9/19/2017   Q1: Little interest or pleasure in doing things 0 0   Q2: Feeling down, depressed or hopeless 0 0   PHQ-2 Score 0 0   Q1: Little interest or pleasure in doing things Not at all -   Q2: Feeling down, depressed or hopeless Not at all -   PHQ-2 Score 0 -     Do you feel safe in your environment - Yes    Do you have a Health Care Directive?: Yes: Patient states has Advance  "Directive and will bring in a copy to clinic.    Current providers sharing in care for this patient include:   Patient Care Team:  Jose D Vasquez MD as PCP - General (Internal Medicine)    The following health maintenance items are reviewed in Epic and correct as of today:  Health Maintenance   Topic Date Due     ADVANCE DIRECTIVE PLANNING Q5 YRS  12/08/2016     TETANUS IMMUNIZATION (SYSTEM ASSIGNED)  01/09/2018     INFLUENZA VACCINE (Season Ended) 09/01/2018     FALL RISK ASSESSMENT  09/19/2018     PNEUMOCOCCAL  Completed     ROS:  CONSTITUTIONAL: NEGATIVE for fever, chills, change in weight  INTEGUMENTARY/SKIN: NEGATIVE for worrisome rashes, moles or lesions  EYES: NEGATIVE for vision changes or irritation  ENT/MOUTH: NEGATIVE for ear, mouth and throat problems  RESP: NEGATIVE for significant cough or SOB  BREAST: NEGATIVE for masses, tenderness or discharge  CV: NEGATIVE for chest pain, palpitations or peripheral edema  GI: NEGATIVE for nausea, abdominal pain, heartburn, or change in bowel habits  : NEGATIVE for frequency, dysuria, or hematuria  MUSCULOSKELETAL: NEGATIVE for significant arthralgias or myalgia  NEURO: NEGATIVE for weakness, dizziness or paresthesias  ENDOCRINE: NEGATIVE for temperature intolerance, skin/hair changes  HEME: NEGATIVE for bleeding problems  PSYCHIATRIC: NEGATIVE for changes in mood or affect    This document serves as a record of the services and decisions personally performed and made by Jose D Vasquez MD. It was created on his behalf by Sue Scales, a trained medical scribe. The creation of this document is based on the provider's statements to the medical scribe.  Sue Scales June 8, 2018 1:42 PM     OBJECTIVE:   /66 (BP Location: Left arm, Patient Position: Sitting, Cuff Size: Adult Large)  Pulse 67  Temp 98.4  F (36.9  C) (Oral)  Resp 16  Ht 5' 9\" (1.753 m)  Wt 225 lb (102.1 kg)  SpO2 98%  BMI 33.23 kg/m2 Estimated body mass index is 33.23 kg/(m^2) as " "calculated from the following:    Height as of this encounter: 5' 9\" (1.753 m).    Weight as of this encounter: 225 lb (102.1 kg).     EXAM:   GENERAL: healthy, alert and no distress  EYES: Eyes grossly normal to inspection, PERRL and conjunctivae and sclerae normal  HENT: ear canals and TM's normal, nose and mouth without ulcers or lesions  NECK: no adenopathy, no asymmetry, masses, or scars and thyroid normal to palpation  RESP: lungs clear to auscultation - no rales, rhonchi or wheezes  CV: regular rate and rhythm, normal S1 S2, no S3 or S4, no murmur, click or rub, no peripheral edema and peripheral pulses strong  ABDOMEN: soft, nontender, no hepatosplenomegaly, no masses and bowel sounds normal  MS: no gross musculoskeletal defects noted, no edema  SKIN: no suspicious lesions or rashes  NEURO: Normal strength and tone, mentation intact and speech normal  PSYCH: mentation appears normal, affect normal/bright    /Rectal exam deferred to urology   ASSESSMENT / PLAN:   (Z00.00) Routine general medical examination at a health care facility  (primary encounter diagnosis)  Comment: Stable health. See epic orders.    (I10) BENIGN HYPERTENSION  Comment: BP at target today. Refilled rx, lowered Maxzide-25 dosage to 1 tablet once daily.   Plan: amLODIPine (NORVASC) 2.5 MG tablet,         triamterene-hydrochlorothiazide (MAXZIDE-25)         37.5-25 MG per tablet          (R60.9) Edema, unspecified type  Comment: Improved, lowered HCTZ dosage to 25 mg daily  Plan: triamterene-hydrochlorothiazide (MAXZIDE-25)         37.5-25 MG per tablet          (E78.2) Mixed hyperlipidemia  Comment: LDL at target. Refilled rx, continue current meds.  Plan: simvastatin (ZOCOR) 20 MG tablet          Everything looks fine!    Refills of medications have been faxed to your pharmacy.     I'll get back to you with lab results soon, especially if there is anything of concern.      See you in a year, sooner if problems.      End of Life " "Planning:  Patient currently has an advanced directive: Yes.  Practitioner is supportive of decision.    COUNSELING:  Reviewed preventive health counseling, as reflected in patient instructions       Regular exercise       Healthy diet/nutrition       Immunizations       Colon cancer screening       Prostate cancer screening    Estimated body mass index is 33.23 kg/(m^2) as calculated from the following:    Height as of this encounter: 5' 9\" (1.753 m).    Weight as of this encounter: 225 lb (102.1 kg).  Weight management plan: Discussed healthy diet and exercise guidelines and patient will follow up in 12 months in clinic to re-evaluate.   reports that he quit smoking about 46 years ago. His smoking use included Cigarettes and Pipe. He has a 10.00 pack-year smoking history. He has never used smokeless tobacco.    Appropriate preventive services were discussed with this patient, including applicable screening as appropriate for cardiovascular disease, diabetes, osteopenia/osteoporosis, and glaucoma.  As appropriate for age/gender, discussed screening for colorectal cancer, prostate cancer, breast cancer, and cervical cancer. Checklist reviewing preventive services available has been given to the patient.    Reviewed patients plan of care and provided an AVS. The Basic Care Plan (routine screening as documented in Health Maintenance) for Raul meets the Care Plan requirement. This Care Plan has been established and reviewed with the Patient and spouse.    Counseling Resources:  ATP IV Guidelines  Pooled Cohorts Equation Calculator  Breast Cancer Risk Calculator  FRAX Risk Assessment  ICSI Preventive Guidelines  Dietary Guidelines for Americans, 2010  USDA's MyPlate  ASA Prophylaxis  Lung CA Screening    The information in this document, created by the medical scribe for me, accurately reflects the services I personally performed and the decisions made by me. I have reviewed and approved this document for accuracy " prior to leaving the patient care area.  June 8, 2018 1:41 PM    Jose D Vasquez MD  [facility]

## 2018-06-14 ENCOUNTER — TELEPHONE (OUTPATIENT)
Dept: INTERNAL MEDICINE | Facility: CLINIC | Age: 83
End: 2018-06-14

## 2018-06-14 NOTE — TELEPHONE ENCOUNTER
[facility] requesting clarification on prescription for Amlodipine. They have calcium channel blocker allergy listed for patient.     Contacted [facility] and spoke to Tana. Informed him patient has tolerated Amlodipine in the past without side effects, okay to fill prescription. Transferred to Viv, Pharmacist and confirmed okay to dispense Amlodipine for patient.

## 2018-06-21 ENCOUNTER — ALLIED HEALTH/NURSE VISIT (OUTPATIENT)
Dept: NURSING | Facility: CLINIC | Age: 83
End: 2018-06-21
Payer: COMMERCIAL

## 2018-06-21 VITALS — SYSTOLIC BLOOD PRESSURE: 158 MMHG | DIASTOLIC BLOOD PRESSURE: 80 MMHG

## 2018-06-21 DIAGNOSIS — I10 ESSENTIAL HYPERTENSION, BENIGN: Primary | ICD-10-CM

## 2018-06-21 PROCEDURE — 99207 ZZC NO CHARGE NURSE ONLY: CPT

## 2018-06-21 NOTE — NURSING NOTE
Pt came in for nurse BP. Checked twice (20 minutes apart)  158/80 and 149/78.  Pt had Remicade infusion this morning, Bp was checked several times while and before/after Tx , elevated BP every time was checked. Please advise.  Krystina Conner MA

## 2018-06-21 NOTE — MR AVS SNAPSHOT
After Visit Summary   6/21/2018    Raul Younger    MRN: [id number]           Patient Information     Date Of Birth          1935        Visit Information        Provider Department      6/21/2018 1:30 PM [facility] IM NURSE [facility]        Today's Diagnoses     Essential hypertension, benign    -  1       Follow-ups after your visit        Your next 10 appointments already scheduled     Jul 16, 2018 11:30 AM CDT   Return Sleep Patient with Yoan Maria MD   [facility]    [address]  [place] [place] [facility] [address]   [phone]            Oct 18, 2018 11:00 AM CDT   [facility]    [address]  [facility] [address] [phone]           Please do not eat 10-12 hours before your appointment if you are coming in fasting for labs on lipids, cholesterol, or glucose (sugar). This does not apply to pregnant women. Water, hot tea and black coffee (with nothing added) are okay. Do not drink other fluids, diet soda or chew gum.            Oct 22, 2018 11:00 AM CDT   Return Visit with Bhavin Celeste MD   [facility] ([facility])    [address] [facility]  [address]  [facility] [address] [phone]              Who to contact     If you have questions or need follow up information about today's clinic visit or your schedule please contact [facility] directly at [phone].  Normal or non-critical lab and imaging results will be communicated to you by MyChart, letter or phone within 4 business days after the clinic has received the results. If you do not hear from us within 7 days, please contact the clinic through MyChart or phone. If you have a critical or abnormal lab result, we will notify you by phone as soon as possible.  Submit refill requests  through [employer] or call your pharmacy and they will forward the refill request to us. Please allow 3 business days for your refill to be completed.          Additional Information About Your Visit        [employer] Information     [employer] gives you secure access to your electronic health record. If you see a primary care provider, you can also send messages to your care team and make appointments. If you have questions, please call your primary care clinic.  If you do not have a primary care provider, please call [phone] and they will assist you.        Care EveryWhere ID     This is your Care EveryWhere ID. This could be used by other organizations to access your [place] medical records  [id number]         Blood Pressure from Last 3 Encounters:   06/21/18 158/80   06/08/18 122/66   09/21/17 134/72    Weight from Last 3 Encounters:   06/08/18 225 lb (102.1 kg)   04/23/18 218 lb (98.9 kg)   01/22/18 218 lb (98.9 kg)              Today, you had the following     No orders found for display       Primary Care Provider Office Phone # Fax #    Jose D Vasquez [phone]       [address] [facility] [address]  [place] [id number]        Equal Access to Services     DAO TELLEZ : Hadii aad ku hadasho Soomaali, waaxda luqadaha, qaybta kaalmada adeegyada, wei robertson. So [facility] [phone].    ATENCIÓN: Si habla español, tiene a bautista disposición servicios gratuitos de asistencia lingüística. Robbie al [phone].    We comply with applicable federal civil rights laws and [place] laws. We do not discriminate on the basis of race, color, national origin, age, disability, sex, sexual orientation, or gender identity.            Thank you!     Thank you for choosing [facility]  for your care. Our goal is always to provide you with excellent care. Hearing back from our patients is one way we can continue to improve our services. Please take a few minutes to complete the  written survey that you may receive in the mail after your visit with us. Thank you!             Your Updated Medication List - Protect others around you: Learn how to safely use, store and throw away your medicines at www.disposemymeds.org.          This list is accurate as of 6/21/18  2:30 PM.  Always use your most recent med list.                   Brand Name Dispense Instructions for use Diagnosis    acetaminophen 325 MG tablet    TYLENOL    100 tablet    Take 2 tablets (650 mg) by mouth every 4 hours as needed for other (mild pain)    Ventral hernia without obstruction or gangrene       ALEVE 220 MG tablet   Generic drug:  naproxen sodium      Take by mouth as needed for moderate pain Reported on 3/22/2017        amLODIPine 2.5 MG tablet    NORVASC    90 tablet    Take 1 tablet (2.5 mg) by mouth daily    Essential hypertension, benign       aspirin 81 MG tablet      Take 1 tablet by mouth daily.        cetirizine 10 MG tablet    zyrTEC     Take 10 mg by mouth daily        clotrimazole 1 % cream    LOTRIMIN     Apply topically 2 times daily as needed        docusate calcium 240 MG capsule    SURFAK     Take 240 mg by mouth daily as needed        folic acid 1 MG tablet    FOLVITE     Take 1 mg by mouth daily        Menthol (Topical Analgesic) 2 % Gel      daily as needed        methotrexate 2.5 MG tablet CHEMO      Take 25 mg by mouth once a week On Thursdays        PRESERVISION AREDS PO      Take 1 tablet by mouth every morning        REMICADE IV      Infusion every 7 weeks        senna-docusate 8.6-50 MG per tablet    SENOKOT-S;PERICOLACE    30 tablet    Take 1-2 tablets by mouth 2 times daily Take while on oral narcotics to prevent or treat constipation.    Ventral hernia without obstruction or gangrene       simvastatin 20 MG tablet    ZOCOR    90 tablet    Take 1 tablet (20 mg) by mouth At Bedtime    Mixed hyperlipidemia       triamterene-hydrochlorothiazide 37.5-25 MG per tablet    MAXZIDE-25    90 tablet     Take 1 tablet by mouth daily    Essential hypertension, benign, Edema, unspecified type

## 2018-06-22 ENCOUNTER — TELEPHONE (OUTPATIENT)
Dept: INTERNAL MEDICINE | Facility: CLINIC | Age: 83
End: 2018-06-22

## 2018-06-22 NOTE — TELEPHONE ENCOUNTER
Patient informed to monitor and record BP at home, no medication changes recommended at this time. Call back Wednesday with update on BP readings. May increase Triamterene-HCTZ to 2 tabs if BP > 160/100.    Patient verbalizes understanding and agrees with plan.

## 2018-06-22 NOTE — TELEPHONE ENCOUNTER
Patient calls. He was seen for nurse only appointment, he was told the message would be sent to Dr. Vasquez and he would be called with any recommendations.   Patient did have methotrexate and Remicade injecition yesterday, but he has been getting these medications on the same day for a while without any problems.  BP at home is high 140/90s with home BP cuff.    Current BP medications:   Amlodipine 2.5 mg daily  Triamterene-HCTZ 37.5-25 mg - daily. Took 2 tabs today due to BP elevation.     BP medications were changed at 6/8/18 appointment and Triamterene-HCTZ was decreased from BID to daily.  Patient asks if he should adjust medication or just monitor BP.

## 2018-06-22 NOTE — TELEPHONE ENCOUNTER
Have him monitor for now.  If bp is >160/100, can take 2 tabs of triamterene- HCTZ    He should monitor and call next week- midweek with bp readings.

## 2018-06-27 ENCOUNTER — MYC MEDICAL ADVICE (OUTPATIENT)
Dept: INTERNAL MEDICINE | Facility: CLINIC | Age: 83
End: 2018-06-27

## 2018-06-27 NOTE — TELEPHONE ENCOUNTER
Called ph# in chart ([phone])-it has been d/c. There is not another ph# in the chart. Sent pt a my chart message

## 2018-06-27 NOTE — TELEPHONE ENCOUNTER
"Patient calling stating his phone was off for a little while but had message from Kathi to return call to clinic. Per encounter note dated 6/22/18 regarding Maxzide, Dr. Vasquez states, \"Okay to remain on one tab daily. Please advise pt.\". Informed patient he can continue taking just 1 tablet daily.   Patient verbalized understanding, agrees to plan of care, and has no further questions at this time.    "

## 2018-06-27 NOTE — TELEPHONE ENCOUNTER
Pt called with BP #'s-Pt did not have to take 2 Maxzide, still just taking 1. See below messages regarding this     6/23//88 at 5pm  6/24//83 at 10pm  6/25//80 at 1030pm  6/26//78 at 1010pm  6/27//71 at 1145am

## 2018-07-16 ENCOUNTER — OFFICE VISIT (OUTPATIENT)
Dept: SLEEP MEDICINE | Facility: CLINIC | Age: 83
End: 2018-07-16
Payer: COMMERCIAL

## 2018-07-16 VITALS
WEIGHT: 226 LBS | HEIGHT: 69 IN | BODY MASS INDEX: 33.47 KG/M2 | DIASTOLIC BLOOD PRESSURE: 81 MMHG | HEART RATE: 56 BPM | SYSTOLIC BLOOD PRESSURE: 150 MMHG | RESPIRATION RATE: 16 BRPM | OXYGEN SATURATION: 100 %

## 2018-07-16 DIAGNOSIS — G47.33 OSA (OBSTRUCTIVE SLEEP APNEA): Primary | ICD-10-CM

## 2018-07-16 PROCEDURE — 99213 OFFICE O/P EST LOW 20 MIN: CPT | Performed by: INTERNAL MEDICINE

## 2018-07-16 NOTE — NURSING NOTE
"Chief Complaint   Patient presents with     CPAP Follow Up     Follow up roberto        Initial /81  Pulse 56  Resp 16  Ht 1.753 m (5' 9\")  Wt 102.5 kg (226 lb)  SpO2 100%  BMI 33.37 kg/m2 Estimated body mass index is 33.37 kg/(m^2) as calculated from the following:    Height as of this encounter: 1.753 m (5' 9\").    Weight as of this encounter: 102.5 kg (226 lb).    Medication Reconciliation: complete    ESS 8    Adrianna Penaloza MA      "

## 2018-07-16 NOTE — PATIENT INSTRUCTIONS

## 2018-07-16 NOTE — MR AVS SNAPSHOT
After Visit Summary   7/16/2018    Raul Younger    MRN: [id number]           Patient Information     Date Of Birth          1935        Visit Information        Provider Department      7/16/2018 11:30 AM Yoan Maria MD [facility] [place]        Today's Diagnoses     MIGUEL (obstructive sleep apnea)    -  1      Care Instructions      Your BMI is Body mass index is 33.37 kg/(m^2).  Weight management is a personal decision.  If you are interested in exploring weight loss strategies, the following discussion covers the approaches that may be successful. Body mass index (BMI) is one way to tell whether you are at a healthy weight, overweight, or obese. It measures your weight in relation to your height.  A BMI of 18.5 to 24.9 is in the healthy range. A person with a BMI of 25 to 29.9 is considered overweight, and someone with a BMI of 30 or greater is considered obese. More than two-thirds of American adults are considered overweight or obese.  Being overweight or obese increases the risk for further weight gain. Excess weight may lead to heart disease and diabetes.  Creating and following plans for healthy eating and physical activity may help you improve your health.  Weight control is part of healthy lifestyle and includes exercise, emotional health, and healthy eating habits. Careful eating habits lifelong are the mainstay of weight control. Though there are significant health benefits from weight loss, long-term weight loss with diet alone may be very difficult to achieve- studies show long-term success with dietary management in less than 10% of people. Attaining a healthy weight may be especially difficult to achieve in those with severe obesity. In some cases, medications, devices and surgical management might be considered.  What can you do?  If you are overweight or obese and are interested in methods for weight loss, you should discuss this with your provider.     Consider  reducing daily calorie intake by 500 calories.     Keep a food journal.     Avoiding skipping meals, consider cutting portions instead.    Diet combined with exercise helps maintain muscle while optimizing fat loss. Strength training is particularly important for building and maintaining muscle mass. Exercise helps reduce stress, increase energy, and improves fitness. Increasing exercise without diet control, however, may not burn enough calories to loose weight.       Start walking three days a week 10-20 minutes at a time    Work towards walking thirty minutes five days a week     Eventually, increase the speed of your walking for 1-2 minutes at time    In addition, we recommend that you review healthy lifestyles and methods for weight loss available through the National Institutes of Health patient information sites:  http://win.niddk.nih.gov/publications/index.htm    And look into health and wellness programs that may be available through your health insurance provider, employer, local community center, or li club.    Weight management plan: Patient was referred to their PCP to discuss a diet and exercise plan.          Your Body mass index is 33.37 kg/(m^2).  Weight management is a personal decision.  If you are interested in exploring weight loss strategies, the following discussion covers the approaches that may be successful. Body mass index (BMI) is one way to tell whether you are at a healthy weight, overweight, or obese. It measures your weight in relation to your height.  A BMI of 18.5 to 24.9 is in the healthy range. A person with a BMI of 25 to 29.9 is considered overweight, and someone with a BMI of 30 or greater is considered obese. More than two-thirds of American adults are considered overweight or obese.  Being overweight or obese increases the risk for further weight gain. Excess weight may lead to heart disease and diabetes.  Creating and following plans for healthy eating and physical  activity may help you improve your health.  Weight control is part of healthy lifestyle and includes exercise, emotional health, and healthy eating habits. Careful eating habits lifelong are the mainstay of weight control. Though there are significant health benefits from weight loss, long-term weight loss with diet alone may be very difficult to achieve- studies show long-term success with dietary management in less than 10% of people. Attaining a healthy weight may be especially difficult to achieve in those with severe obesity. In some cases, medications, devices and surgical management might be considered.  What can you do?  If you are overweight or obese and are interested in methods for weight loss, you should discuss this with your provider.     Consider reducing daily calorie intake by 500 calories.     Keep a food journal.     Avoiding skipping meals, consider cutting portions instead.    Diet combined with exercise helps maintain muscle while optimizing fat loss. Strength training is particularly important for building and maintaining muscle mass. Exercise helps reduce stress, increase energy, and improves fitness. Increasing exercise without diet control, however, may not burn enough calories to loose weight.       Start walking three days a week 10-20 minutes at a time    Work towards walking thirty minutes five days a week     Eventually, increase the speed of your walking for 1-2 minutes at time    In addition, we recommend that you review healthy lifestyles and methods for weight loss available through the National Institutes of Health patient information sites:  http://win.niddk.nih.gov/publications/index.htm    And look into health and wellness programs that may be available through your health insurance provider, employer, local community center, or li club.    Weight management plan: Patient was referred to their PCP to discuss a diet and exercise plan.          Follow-ups after your visit         Your next 10 appointments already scheduled     Oct 18, 2018 11:00 AM CDT   LAB with [facility]   [facility]    [address]  [facility] [id number] [phone]           Please do not eat 10-12 hours before your appointment if you are coming in fasting for labs on lipids, cholesterol, or glucose (sugar). This does not apply to pregnant women. Water, hot tea and black coffee (with nothing added) are okay. Do not drink other fluids, diet soda or chew gum.            Oct 22, 2018 11:00 AM CDT   Return Visit with Bhavin Celeste MD   [facility] [place] ([facility])    [address] [facility]  [address]  [facility] [address] [phone]              Who to contact     If you have questions or need follow up information about today's clinic visit or your schedule please contact [facility] directly at [phone].  Normal or non-critical lab and imaging results will be communicated to you by [employer], letter or phone within 4 business days after the clinic has received the results. If you do not hear from us within 7 days, please contact the clinic through [employer] or phone. If you have a critical or abnormal lab result, we will notify you by phone as soon as possible.  Submit refill requests through [employer] or call your pharmacy and they will forward the refill request to us. Please allow 3 business days for your refill to be completed.          Additional Information About Your Visit        [employer] Information     [employer] gives you secure access to your electronic health record. If you see a primary care provider, you can also send messages to your care team and make appointments. If you have questions, please call your primary care clinic.  If you do not have a primary care provider, please call [phone] and they will assist you.        Care EveryWhere ID     This is your  "Care EveryWhere ID. This could be used by other organizations to access your [place] medical records  [id number]        Your Vitals Were     Pulse Respirations Height Pulse Oximetry BMI (Body Mass Index)       56 16 1.753 m (5' 9\") 100% 33.37 kg/m2        Blood Pressure from Last 3 Encounters:   07/16/18 150/81   06/21/18 158/80   06/08/18 122/66    Weight from Last 3 Encounters:   07/16/18 102.5 kg (226 lb)   06/08/18 102.1 kg (225 lb)   04/23/18 98.9 kg (218 lb)              We Performed the Following     Comprehensive DME        Primary Care Provider Office Phone # Fax #    Jose D aVsquez [phone]       [address] [place] [id number]        Equal Access to Services     [facility] GEOFF : Hadii simona corral hadasho Sovinicio, waaxda luqadaha, qaybta kaalmada adeegyada, wei harrsion . So [facility] [phone].    ATENCIÓN: Si habla español, tiene a abutista disposición servicios gratuitos de asistencia lingüística. Robbie al [phone].    We comply with applicable federal civil rights laws and [place] laws. We do not discriminate on the basis of race, color, national origin, age, disability, sex, sexual orientation, or gender identity.            Thank you!     Thank you for choosing [facility]  for your care. Our goal is always to provide you with excellent care. Hearing back from our patients is one way we can continue to improve our services. Please take a few minutes to complete the written survey that you may receive in the mail after your visit with us. Thank you!             Your Updated Medication List - Protect others around you: Learn how to safely use, store and throw away your medicines at www.disposemymeds.org.          This list is accurate as of 7/16/18 11:50 AM.  Always use your most recent med list.                   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        cetirizine 10 MG tablet    zyrTEC     Take 10 mg by mouth daily        clotrimazole 1 % cream    LOTRIMIN     Apply topically 2 times daily as needed        docusate calcium 240 MG capsule    SURFAK     Take 240 mg by mouth daily as needed        folic acid 1 MG tablet    FOLVITE     Take 1 mg by mouth daily        Menthol (Topical Analgesic) 2 % Gel      daily as needed        methotrexate 2.5 MG tablet CHEMO      Take 25 mg by mouth once a week On Thursdays        PRESERVISION AREDS PO      Take 1 tablet by mouth every morning        REMICADE IV      Infusion every 7 weeks        senna-docusate 8.6-50 MG per tablet    SENOKOT-S;PERICOLACE    30 tablet    Take 1-2 tablets by mouth 2 times daily Take while on oral narcotics to prevent or treat constipation.    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

## 2018-09-12 DIAGNOSIS — R97.20 ELEVATED PROSTATE SPECIFIC ANTIGEN (PSA): Primary | ICD-10-CM

## 2018-10-08 ENCOUNTER — HOSPITAL ENCOUNTER (EMERGENCY)
Facility: CLINIC | Age: 83
Discharge: HOME | End: 2018-10-08
Attending: EMERGENCY MEDICINE | Admitting: EMERGENCY MEDICINE
Payer: COMMERCIAL

## 2018-10-08 ENCOUNTER — APPOINTMENT (OUTPATIENT)
Dept: CT IMAGING | Facility: CLINIC | Age: 83
End: 2018-10-08
Attending: EMERGENCY MEDICINE
Payer: COMMERCIAL

## 2018-10-08 ENCOUNTER — NURSE TRIAGE (OUTPATIENT)
Dept: NURSING | Facility: CLINIC | Age: 83
End: 2018-10-08

## 2018-10-08 ENCOUNTER — TELEPHONE (OUTPATIENT)
Dept: INTERNAL MEDICINE | Facility: CLINIC | Age: 83
End: 2018-10-08

## 2018-10-08 VITALS
HEART RATE: 64 BPM | DIASTOLIC BLOOD PRESSURE: 80 MMHG | OXYGEN SATURATION: 98 % | WEIGHT: 230 LBS | SYSTOLIC BLOOD PRESSURE: 167 MMHG | RESPIRATION RATE: 16 BRPM | BODY MASS INDEX: 33.97 KG/M2 | TEMPERATURE: 97.4 F

## 2018-10-08 DIAGNOSIS — I10 BENIGN ESSENTIAL HYPERTENSION: ICD-10-CM

## 2018-10-08 DIAGNOSIS — R51.9 NONINTRACTABLE HEADACHE, UNSPECIFIED CHRONICITY PATTERN, UNSPECIFIED HEADACHE TYPE: ICD-10-CM

## 2018-10-08 LAB
ANION GAP SERPL CALCULATED.3IONS-SCNC: 9 MMOL/L (ref 3–14)
BASOPHILS # BLD AUTO: 0.1 10E9/L (ref 0–0.2)
BASOPHILS NFR BLD AUTO: 0.7 %
BUN SERPL-MCNC: 16 MG/DL (ref 7–30)
CALCIUM SERPL-MCNC: 8.8 MG/DL (ref 8.5–10.1)
CHLORIDE SERPL-SCNC: 105 MMOL/L (ref 94–109)
CO2 SERPL-SCNC: 25 MMOL/L (ref 20–32)
CREAT SERPL-MCNC: 0.88 MG/DL (ref 0.66–1.25)
DIFFERENTIAL METHOD BLD: ABNORMAL
EOSINOPHIL # BLD AUTO: 0.2 10E9/L (ref 0–0.7)
EOSINOPHIL NFR BLD AUTO: 2.3 %
ERYTHROCYTE [DISTWIDTH] IN BLOOD BY AUTOMATED COUNT: 13.7 % (ref 10–15)
GFR SERPL CREATININE-BSD FRML MDRD: 83 ML/MIN/1.7M2
GLUCOSE SERPL-MCNC: 92 MG/DL (ref 70–99)
HCT VFR BLD AUTO: 38.8 % (ref 40–53)
HGB BLD-MCNC: 12.9 G/DL (ref 13.3–17.7)
IMM GRANULOCYTES # BLD: 0 10E9/L (ref 0–0.4)
IMM GRANULOCYTES NFR BLD: 0.3 %
LYMPHOCYTES # BLD AUTO: 2.1 10E9/L (ref 0.8–5.3)
LYMPHOCYTES NFR BLD AUTO: 29.6 %
MCH RBC QN AUTO: 31.1 PG (ref 26.5–33)
MCHC RBC AUTO-ENTMCNC: 33.2 G/DL (ref 31.5–36.5)
MCV RBC AUTO: 94 FL (ref 78–100)
MONOCYTES # BLD AUTO: 0.7 10E9/L (ref 0–1.3)
MONOCYTES NFR BLD AUTO: 10.3 %
NEUTROPHILS # BLD AUTO: 4 10E9/L (ref 1.6–8.3)
NEUTROPHILS NFR BLD AUTO: 56.8 %
NRBC # BLD AUTO: 0 10*3/UL
NRBC BLD AUTO-RTO: 0 /100
PLATELET # BLD AUTO: 248 10E9/L (ref 150–450)
POTASSIUM SERPL-SCNC: 3.5 MMOL/L (ref 3.4–5.3)
RBC # BLD AUTO: 4.15 10E12/L (ref 4.4–5.9)
SODIUM SERPL-SCNC: 139 MMOL/L (ref 133–144)
WBC # BLD AUTO: 7 10E9/L (ref 4–11)

## 2018-10-08 PROCEDURE — 85025 COMPLETE CBC W/AUTO DIFF WBC: CPT | Performed by: EMERGENCY MEDICINE

## 2018-10-08 PROCEDURE — 99285 EMERGENCY DEPT VISIT HI MDM: CPT | Mod: 25

## 2018-10-08 PROCEDURE — 70450 CT HEAD/BRAIN W/O DYE: CPT

## 2018-10-08 PROCEDURE — 93005 ELECTROCARDIOGRAM TRACING: CPT

## 2018-10-08 PROCEDURE — 80048 BASIC METABOLIC PNL TOTAL CA: CPT | Performed by: EMERGENCY MEDICINE

## 2018-10-08 ASSESSMENT — ENCOUNTER SYMPTOMS
WEAKNESS: 0
FEVER: 0
NUMBNESS: 0
SHORTNESS OF BREATH: 0
HEADACHES: 1

## 2018-10-08 NOTE — ED AVS SNAPSHOT
[facility] Emergency Department    [address]    [facility] [address]    Phone:  [phone]    Fax:  [phone]                                       Raul Younger   MRN: [id number]    Department:  [facility] Emergency Department   Date of Visit:  10/8/2018           Patient Information     Date Of Birth          1935        Your diagnoses for this visit were:     Benign essential hypertension     Nonintractable headache, unspecified chronicity pattern, unspecified headache type        You were seen by Crow Love MD and Enriqueta Romero MD.      Follow-up Information     Follow up with Jose D Vasquez MD.    Specialty:  Internal Medicine    Contact information:    [address]  [facility] [address]  [phone]          Follow up with [facility] Emergency Department.    Specialty:  EMERGENCY MEDICINE    Why:  If symptoms worsen    Contact information:    [address]  [facility] [id number] [phone]        Discharge Instructions       Follow up with your doctor tomorrow at appointment for repeat blood pressure    Discharge Instructions  Headache    You were seen today for a headache. Headaches may be caused by many different things such as muscle tension, sinus inflammation, anxiety and stress, having too little sleep, too much alcohol, some medical conditions or injury. You may have a migraine, which is caused by changes in the blood vessels in your head.  At this time your provider does not find that your headache is a sign of anything dangerous or life-threatening.  However, sometimes the signs of serious illness do not show up right away.      Generally, every Emergency Department visit should have a follow-up clinic visit with either a primary or a specialty clinic/provider. Please follow-up as instructed by your emergency provider today.    Return to the Emergency Department if:    You get  a new fever of 100.4 F or higher.    Your headache gets much worse.    You get a stiff neck with your headache.    You get a new headache that is significantly different or worse than headaches you have had before.    You are vomiting (throwing up) and cannot keep food or water down.    You have blurry or double vision or other problems with your eyes.    You have a new weakness on one side of your body.    You have difficulty with balance which is new.    You or your family thinks you are confused.    You have a seizure.    What can I do to help myself?    Pain medications - You may take a pain medication such as Tylenol  (acetaminophen), Advil , Motrin  (ibuprofen) or Aleve  (naproxen).    Take a pain reliever as soon as you notice symptoms.  Starting medications as soon as you start to have symptoms may lessen the amount of pain you have.    Relaxing in a quiet, dark room may help.    Get enough sleep and eat meals regularly.    You may need to watch for certain foods or other things which may trigger your headaches.  Keeping a journal of your headaches and possible triggers may help you and your primary provider to identify things which you should avoid which may be causing your headaches.  If you were given a prescription for medicine here today, be sure to read all of the information (including the package insert) that comes with your prescription.  This will include important information about the medicine, its side effects, and any warnings that you need to know about.  The pharmacist who fills the prescription can provide more information and answer questions you may have about the medicine.  If you have questions or concerns that the pharmacist cannot address, please call or return to the Emergency Department.   Remember that you can always come back to the Emergency Department if you are not able to see your regular provider in the amount of time listed above, if you get any new symptoms, or if there is  anything that worries you.    Your next 10 appointments already scheduled     Oct 09, 2018  4:40 PM CDT   [facility] D [facility]    [address]  [facility] [address]   [phone]            Oct 18, 2018 11:00 AM CDT   [facility] with [facility]   [facility]    [address]  [facility] [address]   [phone]           Please do not eat 10-12 hours before your appointment if you are coming in fasting for labs on lipids, cholesterol, or glucose (sugar). This does not apply to pregnant women. Water, hot tea and black coffee (with nothing added) are okay. Do not drink other fluids, diet soda or chew gum.            Oct 22, 2018 11:00 AM CDT   Return Visit with Bhavin Celeste MD   [facility] ([facility])    [address] [facility]  [address]  [facility] [id number] [phone]            Jul 17, 2019 11:30 AM CDT   Return Sleep Patient with Yoan Maria MD   [facility] ([facility])    [address]  [address]  [facility] [id number] [phone]              24 Hour Appointment Hotline       To make an appointment at any [facility], call [phone]). If you don't have a family doctor or clinic, we will help you find one. [facility] are conveniently located to serve the needs of you and your family.             Review of your medicines      Our records show that you are taking the medicines listed below. If these are incorrect, please call your family doctor or clinic.        Dose / Directions Last dose taken    acetaminophen 325 MG tablet   Commonly known as:  TYLENOL   Dose:  650 mg   Quantity:  100 tablet        Take 2 tablets (650 mg) by mouth every 4 hours as needed for other (mild pain)   Refills:  0        ALEVE 220 MG tablet    Generic drug:  naproxen sodium        Take by mouth as needed for moderate pain Reported on 3/22/2017   Refills:  0        amLODIPine 2.5 MG tablet   Commonly known as:  NORVASC   Dose:  2.5 mg   Quantity:  90 tablet        Take 1 tablet (2.5 mg) by mouth daily   Refills:  3        aspirin 81 MG tablet   Dose:  1 tablet        Take 1 tablet by mouth daily.   Refills:  0        cetirizine 10 MG tablet   Commonly known as:  zyrTEC   Dose:  10 mg        Take 10 mg by mouth daily   Refills:  0        clotrimazole 1 % cream   Commonly known as:  LOTRIMIN        Apply topically 2 times daily as needed   Refills:  0        docusate calcium 240 MG capsule   Commonly known as:  SURFAK   Dose:  240 mg        Take 240 mg by mouth daily as needed   Refills:  0        folic acid 1 MG tablet   Commonly known as:  FOLVITE   Dose:  1 mg        Take 1 mg by mouth daily   Refills:  0        Menthol (Topical Analgesic) 2 % Gel        daily as needed   Refills:  0        methotrexate 2.5 MG tablet CHEMO   Dose:  25 mg   Indication:  10 tabs at once        Take 25 mg by mouth once a week On Thursdays   Refills:  0        PRESERVISION AREDS PO   Dose:  1 tablet        Take 1 tablet by mouth every morning   Refills:  0        REMICADE IV        Infusion every 7 weeks   Refills:  0        senna-docusate 8.6-50 MG per tablet   Commonly known as:  SENOKOT-S;PERICOLACE   Dose:  1-2 tablet   Quantity:  30 tablet        Take 1-2 tablets by mouth 2 times daily Take while on oral narcotics to prevent or treat constipation.   Refills:  0        simvastatin 20 MG tablet   Commonly known as:  ZOCOR   Dose:  20 mg   Quantity:  90 tablet        Take 1 tablet (20 mg) by mouth At Bedtime   Refills:  3        triamterene-hydrochlorothiazide 37.5-25 MG per tablet   Commonly known as:  MAXZIDE-25   Dose:  1 tablet   Quantity:  90 tablet        Take 1 tablet by mouth daily   Refills:  3                Procedures and tests performed during your visit      Basic metabolic panel    CBC with platelets differential    EKG 12-lead, tracing only    Head CT w/o contrast      Orders Needing Specimen Collection     None      Pending Results     Date and Time Order Name Status Description    10/8/2018 2051 Head CT w/o contrast Preliminary     10/8/2018 2051 EKG 12-lead, tracing only Preliminary             Pending Culture Results     No orders found from 10/6/2018 to 10/9/2018.            Pending Results Instructions     If you had any lab results that were not finalized at the time of your Discharge, you can call the ED Lab Result RN at [phone]. You will be contacted by this team for any positive Lab results or changes in treatment. The nurses are available 7 days a week from 10A to 6:30P.  You can leave a message 24 hours per day and they will return your call.        Test Results From Your Hospital Stay        10/8/2018  9:06 PM      Component Results     Component Value Ref Range & Units Status    WBC 7.0 4.0 - 11.0 10e9/L Final    RBC Count 4.15 (L) 4.4 - 5.9 10e12/L Final    Hemoglobin 12.9 (L) 13.3 - 17.7 g/dL Final    Hematocrit 38.8 (L) 40.0 - 53.0 % Final    MCV 94 78 - 100 fl Final    MCH 31.1 26.5 - 33.0 pg Final    MCHC 33.2 31.5 - 36.5 g/dL Final    RDW 13.7 10.0 - 15.0 % Final    Platelet Count 248 150 - 450 10e9/L Final    Diff Method Automated Method  Final    % Neutrophils 56.8 % Final    % Lymphocytes 29.6 % Final    % Monocytes 10.3 % Final    % Eosinophils 2.3 % Final    % Basophils 0.7 % Final    % Immature Granulocytes 0.3 % Final    Nucleated RBCs 0 0 /100 Final    Absolute Neutrophil 4.0 1.6 - 8.3 10e9/L Final    Absolute Lymphocytes 2.1 0.8 - 5.3 10e9/L Final    Absolute Monocytes 0.7 0.0 - 1.3 10e9/L Final    Absolute Eosinophils 0.2 0.0 - 0.7 10e9/L Final    Absolute Basophils 0.1 0.0 - 0.2 10e9/L Final    Abs Immature Granulocytes 0.0 0 - 0.4 10e9/L Final    Absolute Nucleated RBC 0.0  Final         10/8/2018  9:29 PM      Component Results      Component Value Ref Range & Units Status    Sodium 139 133 - 144 mmol/L Final    Potassium 3.5 3.4 - 5.3 mmol/L Final    Chloride 105 94 - 109 mmol/L Final    Carbon Dioxide 25 20 - 32 mmol/L Final    Anion Gap 9 3 - 14 mmol/L Final    Glucose 92 70 - 99 mg/dL Final    Urea Nitrogen 16 7 - 30 mg/dL Final    Creatinine 0.88 0.66 - 1.25 mg/dL Final    GFR Estimate 83 >60 mL/min/1.7m2 Final    Non  GFR Calc    GFR Estimate If Black >90 >60 mL/min/1.7m2 Final    African American GFR Calc    Calcium 8.8 8.5 - 10.1 mg/dL Final         10/8/2018  9:41 PM      Narrative     CT OF THE HEAD WITHOUT CONTRAST 10/8/2018 9:27 PM     COMPARISON: Head CT 11/8/2013    HISTORY: Headache, hypertension.    TECHNIQUE: 5 mm thick axial CT images of the head were acquired  without IV contrast material.    FINDINGS: There is moderate diffuse cerebral volume loss. There are  subtle patchy areas of decreased density in the cerebral white matter  bilaterally that are consistent with sequela of chronic small vessel  ischemic disease.    The ventricles and basal cisterns are within normal limits in  configuration given the degree of cerebral volume loss.  There is no  midline shift. There are no extra-axial fluid collections.    No intracranial hemorrhage, mass or recent infarct.    The visualized paranasal sinuses are well-aerated. There is no  mastoiditis. There are no fractures of the visualized bones.        Impression     IMPRESSION: Diffuse cerebral volume loss and cerebral white matter  changes consistent with chronic small vessel ischemic disease. No  evidence for acute intracranial pathology.      Radiation dose for this scan was reduced using automated exposure  control, adjustment of the mA and/or kV according to patient size, or  iterative reconstruction technique                Clinical Quality Measure: Blood Pressure Screening     Your blood pressure was checked while you were in the emergency department today.  The last reading we obtained was  BP: 164/90 . Please read the guidelines below about what these numbers mean and what you should do about them.  If your systolic blood pressure (the top number) is less than 120 and your diastolic blood pressure (the bottom number) is less than 80, then your blood pressure is normal. There is nothing more that you need to do about it.  If your systolic blood pressure (the top number) is 120-139 or your diastolic blood pressure (the bottom number) is 80-89, your blood pressure may be higher than it should be. You should have your blood pressure rechecked within a year by a primary care provider.  If your systolic blood pressure (the top number) is 140 or greater or your diastolic blood pressure (the bottom number) is 90 or greater, you may have high blood pressure. High blood pressure is treatable, but if left untreated over time it can put you at risk for heart attack, stroke, or kidney failure. You should have your blood pressure rechecked by a primary care provider within the next 4 weeks.  If your provider in the emergency department today gave you specific instructions to follow-up with your doctor or provider even sooner than that, you should follow that instruction and not wait for up to 4 weeks for your follow-up visit.        Thank you for choosing [facility]       Thank you for choosing [facility] for your care. Our goal is always to provide you with excellent care. Hearing back from our patients is one way we can continue to improve our services. Please take a few minutes to complete the written survey that you may receive in the mail after you visit with us. Thank you!        [facility] Information     [employer] gives you secure access to your electronic health record. If you see a primary care provider, you can also send messages to your care team and make appointments. If you have questions, please call your primary care clinic.  If you do not have a primary care provider,  please call [phone] and they will assist you.        Care EveryWhere ID     This is your Care EveryWhere ID. This could be used by other organizations to access your [place] medical records  [id number]        Equal Access to Services     DAO ROBERTSON: Roopa Long, wadana salcedo, qagoyo kaalmahaley ansari, wei robertson. So [facility] [phone].    ATENCIÓN: Si habla español, tiene a bautista disposición servicios gratuitos de asistencia lingüística. Llame al [phone].    We comply with applicable federal civil rights laws and [place] laws. We do not discriminate on the basis of race, color, national origin, age, disability, sex, sexual orientation, or gender identity.            After Visit Summary       This is your record. Keep this with you and show to your community pharmacist(s) and doctor(s) at your next visit.

## 2018-10-08 NOTE — TELEPHONE ENCOUNTER
Pt calls stating his BP has been elevated.     He has mild headache, so has been checking BP.     172/92, 140/85, 175/98.     His wife has appt tomorrow afternoon at 5, with another doctor, so he is asking if can see provider around that time.     Advised him that no openings with any provider.     Please advise.     Takes Amlodipine, 1 tablet daily and Maxide-25, 1 tablet daily.

## 2018-10-08 NOTE — ED AVS SNAPSHOT
[facility] Emergency Department    [address]    [facility] [address]    Phone:  [phone]    Fax:  [phone]                                       Raul Younger   MRN: [id number]    Department:  [facility] Emergency Department   Date of Visit:  10/8/2018           After Visit Summary Signature Page     I have received my discharge instructions, and my questions have been answered. I have discussed any challenges I see with this plan with the nurse or doctor.    ..........................................................................................................................................  Patient/Patient Representative Signature      ..........................................................................................................................................  Patient Representative Print Name and Relationship to Patient    ..................................................               ................................................  Date                                   Time    ..........................................................................................................................................  Reviewed by Signature/Title    ...................................................              ..............................................  Date                                               Time          22EPIC Rev 08/18

## 2018-10-09 ENCOUNTER — OFFICE VISIT (OUTPATIENT)
Dept: INTERNAL MEDICINE | Facility: CLINIC | Age: 83
End: 2018-10-09
Payer: COMMERCIAL

## 2018-10-09 VITALS
HEART RATE: 74 BPM | HEIGHT: 69 IN | OXYGEN SATURATION: 99 % | SYSTOLIC BLOOD PRESSURE: 128 MMHG | TEMPERATURE: 98.2 F | WEIGHT: 224 LBS | RESPIRATION RATE: 16 BRPM | DIASTOLIC BLOOD PRESSURE: 68 MMHG | BODY MASS INDEX: 33.18 KG/M2

## 2018-10-09 DIAGNOSIS — Z23 NEED FOR TDAP VACCINATION: ICD-10-CM

## 2018-10-09 DIAGNOSIS — M06.9 RHEUMATOID ARTHRITIS, INVOLVING UNSPECIFIED SITE, UNSPECIFIED RHEUMATOID FACTOR PRESENCE: ICD-10-CM

## 2018-10-09 DIAGNOSIS — I10 ESSENTIAL HYPERTENSION, BENIGN: Primary | ICD-10-CM

## 2018-10-09 LAB — INTERPRETATION ECG - MUSE: NORMAL

## 2018-10-09 PROCEDURE — 90715 TDAP VACCINE 7 YRS/> IM: CPT | Performed by: INTERNAL MEDICINE

## 2018-10-09 PROCEDURE — 90471 IMMUNIZATION ADMIN: CPT | Performed by: INTERNAL MEDICINE

## 2018-10-09 PROCEDURE — 99213 OFFICE O/P EST LOW 20 MIN: CPT | Mod: 25 | Performed by: INTERNAL MEDICINE

## 2018-10-09 RX ORDER — AMLODIPINE BESYLATE 2.5 MG/1
5 TABLET ORAL DAILY
Qty: 180 TABLET | Refills: 0
Start: 2018-10-09 | End: 2018-11-13

## 2018-10-09 NOTE — TELEPHONE ENCOUNTER
Reason for Disposition    [1] BP  >= 200/120  AND [2] having NO cardiac or neurologic symptoms    Additional Information    Negative: Difficult to awaken or acting confused  (e.g., disoriented, slurred speech)    Negative: Severe difficulty breathing (e.g., struggling for each breath, speaks in single words)    Negative: [1] Weakness of the face, arm or leg on one side of the body AND [2] new onset    Negative: [1] Numbness (i.e., loss of sensation) of the face, arm or leg on one side of the body AND [2] new onset    Negative: [1] Chest pain lasts > 5 minutes AND [2] history of heart disease  (i.e., heart attack, bypass surgery, angina, angioplasty, CHF)    Negative: [1] Chest pain AND [2] took nitrogylcerin AND [3] pain was not relieved    Negative: Sounds like a life-threatening emergency to the triager    Negative: Symptom is main concern  (e.g., headache, chest pain)    Negative: Low blood pressure is main concern    Negative: [1] BP  >= 160 / 100 AND [2] cardiac or neurologic symptoms    (e.g., chest pain, difficulty breathing, unsteady gait, blurred vision)    Negative: [1] Pregnant AND [2] new hand or face swelling    Negative: [1] Pregnant > 20 weeks AND [2] BP  >= 140/90    Protocols used: HIGH BLOOD PRESSURE-ADULT-AH  Patient called stating that his blood pressure has become more elevated with his arm cuff.  He obtained readings of 160/92 and 205/105 with pulse of 66.  He has a slight headache, but no other symptoms.

## 2018-10-09 NOTE — ED TRIAGE NOTES
Headache off and on for about a week.  Today pain is worse and more persistant.  Pt took BP today and it was as high as 205/105.

## 2018-10-09 NOTE — DISCHARGE INSTRUCTIONS
Follow up with your doctor tomorrow at appointment for repeat blood pressure    Discharge Instructions  Headache    You were seen today for a headache. Headaches may be caused by many different things such as muscle tension, sinus inflammation, anxiety and stress, having too little sleep, too much alcohol, some medical conditions or injury. You may have a migraine, which is caused by changes in the blood vessels in your head.  At this time your provider does not find that your headache is a sign of anything dangerous or life-threatening.  However, sometimes the signs of serious illness do not show up right away.      Generally, every Emergency Department visit should have a follow-up clinic visit with either a primary or a specialty clinic/provider. Please follow-up as instructed by your emergency provider today.    Return to the Emergency Department if:    You get a new fever of 100.4 F or higher.    Your headache gets much worse.    You get a stiff neck with your headache.    You get a new headache that is significantly different or worse than headaches you have had before.    You are vomiting (throwing up) and cannot keep food or water down.    You have blurry or double vision or other problems with your eyes.    You have a new weakness on one side of your body.    You have difficulty with balance which is new.    You or your family thinks you are confused.    You have a seizure.    What can I do to help myself?    Pain medications - You may take a pain medication such as Tylenol  (acetaminophen), Advil , Motrin  (ibuprofen) or Aleve  (naproxen).    Take a pain reliever as soon as you notice symptoms.  Starting medications as soon as you start to have symptoms may lessen the amount of pain you have.    Relaxing in a quiet, dark room may help.    Get enough sleep and eat meals regularly.    You may need to watch for certain foods or other things which may trigger your headaches.  Keeping a journal of your  headaches and possible triggers may help you and your primary provider to identify things which you should avoid which may be causing your headaches.  If you were given a prescription for medicine here today, be sure to read all of the information (including the package insert) that comes with your prescription.  This will include important information about the medicine, its side effects, and any warnings that you need to know about.  The pharmacist who fills the prescription can provide more information and answer questions you may have about the medicine.  If you have questions or concerns that the pharmacist cannot address, please call or return to the Emergency Department.   Remember that you can always come back to the Emergency Department if you are not able to see your regular provider in the amount of time listed above, if you get any new symptoms, or if there is anything that worries you.

## 2018-10-09 NOTE — MR AVS SNAPSHOT
After Visit Summary   10/9/2018    Raul Younger    MRN: [id number]           Patient Information     Date Of Birth          1935        Visit Information        Provider Department      10/9/2018 4:40 PM Jose D Vasquez MD [facility]        Today's Diagnoses     Essential hypertension, benign    -  1      Care Instructions    Let's try raising your amlodipine 2.5 mg tabs, from one per day up to two tablets per day.   Continue the Maxzide-25 (hctz 25 mg/triamterene 37.5 mg) tablet once daily.     Call me with an update on home BP readings in about 2-3 weeks.   We can decide then whether to continue this dose, or go back to the 2.5 mg daily dose of amlodipine.               Follow-ups after your visit        Your next 10 appointments already scheduled     Oct 18, 2018 11:00 AM CDT   LAB with [facility] LAB   [facility]    [address]  [facility] [id number] [phone]           Please do not eat 10-12 hours before your appointment if you are coming in fasting for labs on lipids, cholesterol, or glucose (sugar). This does not apply to pregnant women. Water, hot tea and black coffee (with nothing added) are okay. Do not drink other fluids, diet soda or chew gum.            Oct 22, 2018 11:00 AM CDT   Return Visit with Bhavin Celeste MD   [facility] ([facility])    [address] [facility]  [address]  [facility] [id number] [phone]            Jul 17, 2019 11:30 AM CDT   Return Sleep Patient with Yoan Maria MD   [facility] ([facility]    [address]  [address]  [facility] [id number] [phone]              Who to contact     If you have questions or need follow up information about today's clinic visit or your schedule please contact [facility] directly at  "[phone].  Normal or non-critical lab and imaging results will be communicated to you by [employer], letter or phone within 4 business days after the clinic has received the results. If you do not hear from us within 7 days, please contact the clinic through [employer] or phone. If you have a critical or abnormal lab result, we will notify you by phone as soon as possible.  Submit refill requests through [employer] or call your pharmacy and they will forward the refill request to us. Please allow 3 business days for your refill to be completed.          Additional Information About Your Visit        [employer] Information     [employer] gives you secure access to your electronic health record. If you see a primary care provider, you can also send messages to your care team and make appointments. If you have questions, please call your primary care clinic.  If you do not have a primary care provider, please call [phone] and they will assist you.        Care EveryWhere ID     This is your Care EveryWhere ID. This could be used by other organizations to access your [facility] medical records  [id number]        Your Vitals Were     Pulse Temperature Respirations Height Pulse Oximetry BMI (Body Mass Index)    74 98.2  F (36.8  C) (Oral) 16 5' 9\" (1.753 m) 99% 33.08 kg/m2       Blood Pressure from Last 3 Encounters:   10/09/18 128/68   10/08/18 167/80   07/16/18 150/81    Weight from Last 3 Encounters:   10/09/18 224 lb (101.6 kg)   10/08/18 230 lb (104.3 kg)   07/16/18 226 lb (102.5 kg)              Today, you had the following     No orders found for display         Today's Medication Changes          These changes are accurate as of 10/9/18  5:06 PM.  If you have any questions, ask your nurse or doctor.               These medicines have changed or have updated prescriptions.        Dose/Directions    amLODIPine 2.5 MG tablet   Commonly known as:  NORVASC   This may have changed:  how much to take   Used for:  Essential " hypertension, benign   Changed by:  Jose D Vasquez MD        Dose:  5 mg   Take 2 tablets (5 mg) by mouth daily   Quantity:  180 tablet   Refills:  0            Where to get your medicines      Some of these will need a paper prescription and others can be bought over the counter.  Ask your nurse if you have questions.     You don't need a prescription for these medications     amLODIPine 2.5 MG tablet                Primary Care Provider Office Phone # Fax #    Jose D Vasquez [phone]       [address] [facility] [address]  [facility] [address]        Equal Access to Services     [facility]: Hadii aad ku hadasho Soomaali, waaxda luqadaha, qaybta kaalmada adeegyada, waxay angelin haymariettan danielle harrison . So [facility] [phone].    ATENCIÓN: Si habla español, tiene a bautista disposición servicios gratuitos de asistencia lingüística. [facility] [phone].    We comply with applicable federal civil rights laws and [place] laws. We do not discriminate on the basis of race, color, national origin, age, disability, sex, sexual orientation, or gender identity.            Thank you!     Thank you for choosing [facility]  for your care. Our goal is always to provide you with excellent care. Hearing back from our patients is one way we can continue to improve our services. Please take a few minutes to complete the written survey that you may receive in the mail after your visit with us. Thank you!             Your Updated Medication List - Protect others around you: Learn how to safely use, store and throw away your medicines at www.disposemymeds.org.          This list is accurate as of 10/9/18  5:06 PM.  Always use your most recent med list.                   Brand Name Dispense Instructions for use Diagnosis    acetaminophen 325 MG tablet    TYLENOL    100 tablet    Take 2 tablets (650 mg) by mouth every 4 hours as needed for other (mild pain)    Ventral hernia without obstruction or  gangrene       ALEVE 220 MG tablet   Generic drug:  naproxen sodium      Take by mouth as needed for moderate pain Reported on 3/22/2017        amLODIPine 2.5 MG tablet    NORVASC    180 tablet    Take 2 tablets (5 mg) by mouth daily    Essential hypertension, benign       aspirin 81 MG tablet      Take 1 tablet by mouth daily.        cetirizine 10 MG tablet    zyrTEC     Take 10 mg by mouth daily        clotrimazole 1 % cream    LOTRIMIN     Apply topically 2 times daily as needed        docusate calcium 240 MG capsule    SURFAK     Take 240 mg by mouth daily as needed        folic acid 1 MG tablet    FOLVITE     Take 1 mg by mouth daily        Menthol (Topical Analgesic) 2 % Gel      daily as needed        methotrexate 2.5 MG tablet CHEMO      Take 25 mg by mouth once a week On Thursdays        PRESERVISION AREDS PO      Take 1 tablet by mouth every morning        REMICADE IV      Infusion every 7 weeks        senna-docusate 8.6-50 MG per tablet    SENOKOT-S;PERICOLACE    30 tablet    Take 1-2 tablets by mouth 2 times daily Take while on oral narcotics to prevent or treat constipation.    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

## 2018-10-09 NOTE — ED NOTES
On rechecking pt's BP it was 167/80. MD aware; okayed pt for discharge. Pt to follow up with PCP tomorrow regarding BP and will take his amlodipine when he gets home.

## 2018-10-09 NOTE — ED NOTES
Removed pt's IV, prepping for discharge when pt's BP cuff cycled and read 194/92. MD notified. Will reassess BP in 15 minutes and continue to monitor pt.

## 2018-10-09 NOTE — PATIENT INSTRUCTIONS
Let's try raising your amlodipine 2.5 mg tabs, from one per day up to two tablets per day.   Continue the Maxzide-25 (hctz 25 mg/triamterene 37.5 mg) tablet once daily.     Call me with an update on home BP readings in about 2-3 weeks.   We can decide then whether to continue this dose, or go back to the 2.5 mg daily dose of amlodipine.

## 2018-10-09 NOTE — ED PROVIDER NOTES
History     Chief Complaint:  Headache    HPI   Raul Younger is a 82 year old male, with a history of hypertension, who presents with his wife and daughter to the emergency department for evaluation of a headache. The patient reports experiencing a slight headache in the afternoons the past couple days and all day today. He notes because of the headache he checked his blood pressure. He notes his blood pressure was 160/73 yesterday afternoon and this morning and changed to about 205/109 this evening. He is currently experiencing a slight headache. He denies any fever, chest pain, trouble breathing, numbness, tingling, weakness, recent sickness or recent falls. The patient's wife reports the patient had high blood pressure after his Remicade infusion two infusions ago. His most recent infusion was two weeks ago. He denies any new medications. The patient notes an appointment with his primary care provider tomorrow. No fever. His maxzide was decreased from 2 tabs to 1 tab in June or July 2018.    Allergies:  Latex  Nifedipine     Medications:    Amlodipine  81 mg Aspirin  Zyrtec  Docusate calcium  Folic acid  Remicade  Methotrexate  Aleve  Senna docusate  Zocor  Maxzide    Past Medical History:    RA  HDL  Chronic rhinitis  HTN  Arthritis  Malignant neoplasm  MI  Migraine    Past Surgical History:    Diaphragm repair  Paraesophageal hernia repair  Tonsillectomy  ENT surgery  Genitourinary surgery  Head and Neck surgery  Herniorrhaphy umbilical  Hip I & D  Left hip repair  Orthopedic surgery  Intraocular lens implant  Prostate surgery  Vitrectomy x2    Family History:    Neurologic disorder  CAD  Heart disease  HDL  HTN  Arthritis    Social History:  Smoking status: Former smoker of 1.0 ppd for 10 years  Alcohol use: Yes  The patient presents to the emergency department with his wife and daughter.  Marital Status:   [2]     Review of Systems   Constitutional: Negative for fever.   Respiratory: Negative  for shortness of breath.    Cardiovascular: Negative for chest pain.   Neurological: Positive for headaches. Negative for weakness and numbness.   All other systems reviewed and are negative.    Physical Exam   Patient Vitals for the past 24 hrs:   BP Temp Temp src Pulse Heart Rate Resp SpO2 Weight   10/08/18 2115 164/90 - - - - - - -   10/08/18 2100 (!) 173/102 - - - 56 - 95 % -   10/08/18 2045 180/90 - - - 58 - 96 % -   10/08/18 2018 (!) 192/121 - - - - - - -   10/08/18 2012 - 97.4  F (36.3  C) Oral 64 - 16 99 % 104.3 kg (230 lb)     Physical Exam  General: Resting comfortably on the gurney  Eyes:  The pupils are equal and round    Conjunctivae and sclerae are normal  ENT:    Moist mucous membranes  Neck:  Normal range of motion  CV:  Regular rate and rhythm    Skin warm and well perfused   Resp:  Lungs are clear    Non-labored    No rales    No wheezing   GI:  Abdomen is soft, there is no rigidity    No distension    No rebound tenderness     No abdominal tenderness  MS:  Normal muscular tone  Skin:  No rash or acute skin lesions noted  Neuro:   Awake, alert.      Speech is normal and fluent.    Face is symmetric.     Moves all extremities equally    Finger to nose intact  Psych: Normal affect.  Appropriate interactions.    Emergency Department Course   ECG (21:07:37):  Rate 58 bpm. MN interval 200. QRS duration 112. QT/QTc 432/424. P-R-T axes 6 -42 -13. Sinus bradycardia. Left axis deviation. Right bundle branch block. Abnormal ECG. No significant change when compared to EKG dated 9/19/17. Interpreted at 2110 by Enriqueta Romero MD.    Imaging:  Radiographic findings were communicated with the patient and family who voiced understanding of the findings.    Head CT w/o contrast  IMPRESSION: Diffuse cerebral volume loss and cerebral white matter  changes consistent with chronic small vessel ischemic disease. No  evidence for acute intracranial pathology.  As read by Radiology.    Laboratory:  CBC: HGB 12.9  (L) o/w WNL (WBC 7.0, )  BMP: AWNL (Creatinine 0.88)    Emergency Department Course:  Past medical records, nursing notes, and vitals reviewed.  2043: I performed an exam of the patient and obtained history, as documented above.     IV inserted and blood drawn.    The patient was sent for a head CT while in the emergency department, findings above.    2213: I rechecked the patient. Explained findings to the patient, his wife and his daughter. The patient denies any further workup. He notes that his Maxzide decreased in June from 2 tablets to 1.    Findings and plan explained to the Patient and spouse and daughter. Patient discharged home with instructions regarding supportive care, medications, and reasons to return. The importance of close follow-up was reviewed.   Impression & Plan    Medical Decision Making:  Raul Younger is a 82 year old male who presents for evaluation of elevated blood pressure.  There is a history of hypertension in the past.  The workup here is negative and the patient does not have any clinical, laboratory, ecg or historical signs of end-organ dysfunction.  There is no signs of hypertensive emergency or urgency.  Supportive outpatient management is therefore indicated with close follow-up of primary care physician.  Has f/u appointment tomorrow for repeat blood pressure check. May need his maxzide increased back to 2 tabs daily. No severe or sudden onset of headache to suggest SAH. No fever, illness or ill appearance to suggest meningitis. He declines LP and I don't think it is indicated today. No vision changes or temporal pain (headache located on mid forehead, unlikely temporal arteritis). Will f/u with PCP tomorrow. Has also note taken his home amlodipine dose tonight.     Diagnosis:    ICD-10-CM    1. Benign essential hypertension I10 Basic metabolic panel   2. Nonintractable headache, unspecified chronicity pattern, unspecified headache type R51       Disposition:  Discharged to home with instructions for follow up.  Tammy Wall  10/8/2018   [facility] EMERGENCY DEPARTMENT  Scribe Disclosure:  I, Tammy Wall, am serving as a scribe at 8:43 PM on 10/8/2018 to document services personally performed by Enriqueta Romero MD based on my observations and the provider's statements to me.      Enriqueta Romero MD  10/09/18 0155

## 2018-10-09 NOTE — PROGRESS NOTES
"  SUBJECTIVE:   Raul Younger is a 82 year old male who presents to clinic today for the following health issues:      ED/UC Followup:    Facility:  [facility] ED  Date of visit: 10/8/2018  Reason for visit: Headache and hypertension  Current Status: Improved.        The patient has documented elevated BP readings over the past couple of weeks, usually in the 150-160/ range.   He has also noted more frequent headaches.     He presented to [facility] ED yesterday evening on advice of telephone nurse, after his home BP reached over 200/100.  BP looked better in the ED, and he was treated and released to home.     He has felt better since then. Headache has improved with use of Tylenol.     Problem list and histories reviewed & adjusted, as indicated.  Additional history: as documented    Reviewed and updated as needed this visit by clinical staff  Tobacco  Allergies  Meds  Med Hx  Surg Hx  Fam Hx  Soc Hx      Reviewed and updated as needed this visit by Provider         ROS:  REVIEW OF SYSTEMS: The following systems have been completely reviewed and are negative except as noted above:   Constitutional, respiratory, cardiovascular systems.      OBJECTIVE:                                                    /68  Pulse 74  Temp 98.2  F (36.8  C) (Oral)  Resp 16  Ht 5' 9\" (1.753 m)  Wt 224 lb (101.6 kg)  SpO2 99%  BMI 33.08 kg/m2  Body mass index is 33.08 kg/(m^2).   GENERAL: healthy, alert, well nourished, well hydrated, no distress  RESP: lungs clear to auscultation - no rales, no rhonchi, no wheezes  CV: regular rates and rhythm, normal S1 S2, no S3 or S4 and no murmur, no click or rub -       ASSESSMENT/PLAN:                                                    Essential hypertension, benign  BP now improved. Given recent elevated readings, will raise dose of amlodipine. See pt instructions and epic orders.   - amLODIPine (NORVASC) 2.5 MG tablet; Take 2 tablets (5 mg) by mouth " daily  Dispense: 180 tablet; Refill: 0    Rheumatoid Arthritis:  Continue to follow with Rheumatology as advised.     Need for Tdap vaccination  TDAP given.       Patient Instructions   Let's try raising your amlodipine 2.5 mg tabs, from one per day up to two tablets per day.   Continue the Maxzide-25 (hctz 25 mg/triamterene 37.5 mg) tablet once daily.     Call me with an update on home BP readings in about 2-3 weeks.   We can decide then whether to continue this dose, or go back to the 2.5 mg daily dose of amlodipine.      Jose D Vasquez MD,   [facility]

## 2018-10-18 DIAGNOSIS — R97.20 ELEVATED PROSTATE SPECIFIC ANTIGEN (PSA): ICD-10-CM

## 2018-10-18 PROCEDURE — 84153 ASSAY OF PSA TOTAL: CPT | Performed by: UROLOGY

## 2018-10-18 PROCEDURE — 36415 COLL VENOUS BLD VENIPUNCTURE: CPT | Performed by: UROLOGY

## 2018-10-19 LAB — PSA SERPL-MCNC: 0.52 UG/L (ref 0–4)

## 2018-10-22 ENCOUNTER — OFFICE VISIT (OUTPATIENT)
Dept: UROLOGY | Facility: CLINIC | Age: 83
End: 2018-10-22
Payer: COMMERCIAL

## 2018-10-22 VITALS — WEIGHT: 224 LBS | HEIGHT: 69 IN | OXYGEN SATURATION: 98 % | HEART RATE: 56 BPM | BODY MASS INDEX: 33.18 KG/M2

## 2018-10-22 DIAGNOSIS — C61 MALIGNANT NEOPLASM OF PROSTATE (H): Primary | ICD-10-CM

## 2018-10-22 PROCEDURE — 99213 OFFICE O/P EST LOW 20 MIN: CPT | Performed by: UROLOGY

## 2018-10-22 ASSESSMENT — PAIN SCALES - GENERAL: PAINLEVEL: NO PAIN (0)

## 2018-10-22 NOTE — LETTER
10/22/2018       RE: Raul AGRAWAL Carisa  [address]  [place] [place] [facility] [address]     Dear Colleague,    Thank you for referring your patient, Raul Younger, to the [facility] [place] [facility]. Please see a copy of my visit note below.    Office Visit Note  [facility]  [phone]    UROLOGIC DIAGNOSES:   [place] 4+4 = 8 prostate cancer with PSA recurrence    CURRENT INTERVENTIONS:   Cryoablation of the prostate in 2010    HISTORY:     Raul returns to clinic today for PSA recheck. His PSA remains unchanged at 0.52. He continues to feel well with no urinary symptoms or complaints.    PAST MEDICAL HISTORY:   Past Medical History:   Diagnosis Date     Arthritis      HTN      Malignant neoplasm (H) 01/2011    Prostate cancer     MI, old     vs. asthma per pt.'s wife     Migraine, unspecified, without mention of intractable migraine without mention of status migrainosus     Abstracted 5/15/02.     Other and unspecified hyperlipidemia     Abstracted 5/15/02.     Other and unspecified hyperlipidemia      Sleep apnea        PAST SURGICAL HISTORY:   Past Surgical History:   Procedure Laterality Date     ABDOMEN SURGERY  1980    diaphram repair     C NONSPECIFIC PROCEDURE  1996    Paraesophageal Hernia Repair. Abstracted 5/15/02.     C NONSPECIFIC PROCEDURE  1943    Tonsillectomy. Abstracted 5/15/02.     COLONOSCOPY  12/29/2011    Diverticuli, 3 mm tissue biopsied, path showed no hyperplastic or adenomatous elements     CYSTOSCOPY       ENT SURGERY       GENITOURINARY SURGERY       HEAD & NECK SURGERY       HERNIORRHAPHY UMBILICAL N/A 9/21/2017    Procedure: HERNIORRHAPHY UMBILICAL;  open incarcerated ventral hernia repair with mesh;  Surgeon: Cristina Robbins MD;  Location: [facility] OR     IRRIGATION AND DEBRIDEMENT HIP, COMBINED Left 11/7/2015    Procedure: COMBINED IRRIGATION AND DEBRIDEMENT HIP;  Surgeon:  Stanislav Maharaj MD;  Location: [facility] OR     left pelvis hip repaired (after 8 foot fall)       ORTHOPEDIC SURGERY       PHACOEMULSIFICATION CLEAR CORNEA WITH TORIC INTRAOCULAR LENS IMPLANT  2012    Procedure:PHACOEMULSIFICATION CLEAR CORNEA WITH TORIC INTRAOCULAR LENS IMPLANT; RIGHT PHACOEMULSIFICATION CLEAR CORNEA WITH  DELUXE TORIC INTRAOCULAR LENS IMPLANT; Surgeon:ANDRA LAGUNAS; Location:[facility]     PHACOEMULSIFICATION CLEAR CORNEA WITH TORIC INTRAOCULAR LENS IMPLANT  2012    Procedure:PHACOEMULSIFICATION CLEAR CORNEA WITH TORIC INTRAOCULAR LENS IMPLANT; LEFT PHACOEMULSIFICATION CLEAR CORNEA WITH TORIC INTRAOCULAR LENS IMPLANT ; Surgeon:ANDRA LAGUNAS; Location:[facility]     PROSTATE SURGERY  2010     adwoa placed for left arm fracture       VITRECTOMY PARSPLANA WITH 23 GAUGE SYSTEM  2013    Procedure: VITRECTOMY PARSPLANA WITH 23 GAUGE SYSTEM;  LEFT 23 GAUGE VITRECTOMY, EPIRETINAL MEMBRANE REMOVAL, AIR FLUID EXCHANGE ;  Surgeon: Lawson Celeste MD;  Location: [facility]     VITRECTOMY PARSPLANA WITH 23 GAUGE SYSTEM  2014    Procedure: VITRECTOMY PARSPLANA WITH 23 GAUGE SYSTEM;  RIGHT VITRECTOMY PARSPLANA WITH 23 GAUGE SYSTEM, EPIRETINAL MEMBRANE REMOVAL, AIR/FLUID EXCHANGE ;  Surgeon: Lawson Celeste MD;  Location: [facility]       FAMILY HISTORY:   Family History   Problem Relation Age of Onset     Neurologic Disorder Father       age 70, ALS, laryngeal CA     C.A.D. Father      HEART DISEASE Father      Hyperlipidemia Father      Hypertension Father      Family History Negative Mother       age 99 after hip fx, was almost 100     Hypertension Mother      Hyperlipidemia Mother      HEART DISEASE Sister      Born 1931     Coronary Artery Disease Sister      Hypertension Sister      Arthritis Sister      Born 1933     Hypertension Daughter        SOCIAL HISTORY:   Social History   Substance Use Topics     Smoking status: Former Smoker     Packs/day: 1.00     Years: 10.00     Types: Cigarettes,  "Pipe     Quit date: 1/1/1972     Smokeless tobacco: Never Used     Alcohol use Yes      Comment: Infrequent wine/beer       Current Outpatient Prescriptions   Medication     acetaminophen (TYLENOL) 325 MG tablet     amLODIPine (NORVASC) 2.5 MG tablet     aspirin 81 MG tablet     cetirizine (ZYRTEC) 10 MG tablet     clotrimazole (LOTRIMIN) 1 % cream     docusate calcium (SURFAK) 240 MG capsule     folic acid (FOLVITE) 1 MG tablet     InFLIXimab (REMICADE IV)     Menthol, Topical Analgesic, 2 % GEL     methotrexate 2.5 MG tablet     Multiple Vitamins-Minerals (PRESERVISION AREDS PO)     naproxen sodium (ALEVE) 220 MG tablet     senna-docusate (SENOKOT-S;PERICOLACE) 8.6-50 MG per tablet     simvastatin (ZOCOR) 20 MG tablet     triamterene-hydrochlorothiazide (MAXZIDE-25) 37.5-25 MG per tablet     No current facility-administered medications for this visit.          PHYSICAL EXAM:    Pulse 56  Ht 1.753 m (5' 9\")  Wt 101.6 kg (224 lb)  SpO2 98%  BMI 33.08 kg/m2    Constitutional: Well developed. Conversant and in no acute distress  Eyes: Anicteric sclera, conjunctiva clear, normal extraocular movements  ENT: Normocephalic and atraumatic,   Skin: Warm and dry. No rashes or lesions  Cardiac: No peripheral edema  Back/Flank: Not done  CNS/PNS: Normal musculature and movements, moves all extremities normally  Respiratory: Normal non-labored breathing  Abdomen: Soft nontender and nondistended  Peripheral Vascular: No peripheral edema  Mental Status/Psych: Alert and Oriented x 3. Normal mood and affect    Penis: Not done  Scrotal Skin: Not done  Testicles: Not done  Epididymis: Not done  Digital Rectal Exam:     Cystoscopy: Not done    Imaging: None    Urinalysis: UA RESULTS:  Recent Labs   Lab Test  09/16/17   2143  12/08/16   1530   COLOR  Straw  Yellow   APPEARANCE  Clear  Clear   URINEGLC  Negative  Negative   URINEBILI  Negative  Negative   URINEKETONE  Negative  Negative   SG  1.014  1.020   UBLD  Negative  Negative "   URINEPH  6.0  6.5   PROTEIN  Negative  Negative   UROBILINOGEN   --   1.0   NITRITE  Negative  Negative   LEUKEST  Negative  Negative   RBCU  1  O - 2   WBCU  <1  O - 2       PSA: 0.52    Post Void Residual:     Other labs: None today      IMPRESSION:   PSA stable    PLAN:   His PSA remains low and stable. He appears to be doing well after his cryoablation for prostate cancer. I will plan on seeing him back again in one year to check another PSA.      Bhavin Celeste M.D.

## 2018-10-22 NOTE — MR AVS SNAPSHOT
After Visit Summary   10/22/2018    Raul Younger    MRN: [id number]           Patient Information     Date Of Birth          1935        Visit Information        Provider Department      10/22/2018 11:00 AM Bhavin Celeste MD [facility] [place]        Today's Diagnoses     Malignant neoplasm of prostate (H)    -  1       Follow-ups after your visit        Follow-up notes from your care team     Return in about 1 year (around 10/22/2019) for PSA.      Your next 10 appointments already scheduled     Jul 17, 2019 11:30 AM CDT   Return Sleep Patient with Yoan Maria MD   [facility] ([facility])    [address] [address] [phone]              Future tests that were ordered for you today     Open Future Orders        Priority Expected Expires Ordered    PSA tumor marker Routine  10/22/2019 10/22/2018            Who to contact     If you have questions or need follow up information about today's clinic visit or your schedule please contact [facility] [place] directly at [phone].  Normal or non-critical lab and imaging results will be communicated to you by MyChart, letter or phone within 4 business days after the clinic has received the results. If you do not hear from us within 7 days, please contact the clinic through [employer] or phone. If you have a critical or abnormal lab result, we will notify you by phone as soon as possible.  Submit refill requests through [employer] or call your pharmacy and they will forward the refill request to us. Please allow 3 business days for your refill to be completed.          Additional Information About Your Visit        MyChart Information     [employer] gives you secure access to your electronic health record. If you see a primary care provider, you can also send messages to your care team and make  "appointments. If you have questions, please call your primary care clinic.  If you do not have a primary care provider, please call [phone] and they will assist you.        Care EveryWhere ID     This is your Care EveryWhere ID. This could be used by other organizations to access your [facility] medical records  [id number]        Your Vitals Were     Pulse Height Pulse Oximetry BMI (Body Mass Index)          56 1.753 m (5' 9\") 98% 33.08 kg/m2         Blood Pressure from Last 3 Encounters:   10/09/18 128/68   10/08/18 167/80   07/16/18 150/81    Weight from Last 3 Encounters:   10/22/18 101.6 kg (224 lb)   10/09/18 101.6 kg (224 lb)   10/08/18 104.3 kg (230 lb)               Primary Care Provider Office Phone # Fax #    Jose D Vasquez [phone]       [address] [facility] [address]  [place] [id number]        Equal Access to Services     BEKAH TELLEZ : Hadii aad ku hadasho Soomaali, waaxda luqadaha, qaybta kaalmada adeegyada, waxay idiin hayaan adeeg kharaluis alberto la'isaura . So [facility] [phone].    ATENCIÓN: Si habla español, tiene a bautista disposición servicios gratuitos de asistencia lingüística. [facility] [phone].    We comply with applicable federal civil rights laws and [place] laws. We do not discriminate on the basis of race, color, national origin, age, disability, sex, sexual orientation, or gender identity.            Thank you!     Thank you for choosing [facility] [place]  for your care. Our goal is always to provide you with excellent care. Hearing back from our patients is one way we can continue to improve our services. Please take a few minutes to complete the written survey that you may receive in the mail after your visit with us. Thank you!             Your Updated Medication List - Protect others around you: Learn how to safely use, store and throw away your medicines at www.disposemymeds.org.          This list is accurate as of 10/22/18 11:11 " AM.  Always use your most recent med list.                   Brand Name Dispense Instructions for use Diagnosis    acetaminophen 325 MG tablet    TYLENOL    100 tablet    Take 2 tablets (650 mg) by mouth every 4 hours as needed for other (mild pain)    Ventral hernia without obstruction or gangrene       ALEVE 220 MG tablet   Generic drug:  naproxen sodium      Take by mouth as needed for moderate pain Reported on 3/22/2017        amLODIPine 2.5 MG tablet    NORVASC    180 tablet    Take 2 tablets (5 mg) by mouth daily    Essential hypertension, benign       aspirin 81 MG tablet      Take 1 tablet by mouth daily.        cetirizine 10 MG tablet    zyrTEC     Take 10 mg by mouth daily        clotrimazole 1 % cream    LOTRIMIN     Apply topically 2 times daily as needed        docusate calcium 240 MG capsule    SURFAK     Take 240 mg by mouth daily as needed        folic acid 1 MG tablet    FOLVITE     Take 1 mg by mouth daily        Menthol (Topical Analgesic) 2 % Gel      daily as needed        methotrexate 2.5 MG tablet CHEMO      Take 25 mg by mouth once a week On Thursdays        PRESERVISION AREDS PO      Take 1 tablet by mouth every morning        REMICADE IV      Infusion every 7 weeks        senna-docusate 8.6-50 MG per tablet    SENOKOT-S;PERICOLACE    30 tablet    Take 1-2 tablets by mouth 2 times daily Take while on oral narcotics to prevent or treat constipation.    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

## 2018-10-22 NOTE — PROGRESS NOTES
Office Visit Note  [facility]  [phone]    UROLOGIC DIAGNOSES:   [place] 4+4 = 8 prostate cancer with PSA recurrence    CURRENT INTERVENTIONS:   Cryoablation of the prostate in 2010    HISTORY:     Raul returns to clinic today for PSA recheck. His PSA remains unchanged at 0.52. He continues to feel well with no urinary symptoms or complaints.    PAST MEDICAL HISTORY:   Past Medical History:   Diagnosis Date     Arthritis      HTN      Malignant neoplasm (H) 01/2011    Prostate cancer     MI, old     vs. asthma per pt.'s wife     Migraine, unspecified, without mention of intractable migraine without mention of status migrainosus     Abstracted 5/15/02.     Other and unspecified hyperlipidemia     Abstracted 5/15/02.     Other and unspecified hyperlipidemia      Sleep apnea        PAST SURGICAL HISTORY:   Past Surgical History:   Procedure Laterality Date     ABDOMEN SURGERY  1980    diaphram repair     C NONSPECIFIC PROCEDURE  1996    Paraesophageal Hernia Repair. Abstracted 5/15/02.     C NONSPECIFIC PROCEDURE  1943    Tonsillectomy. Abstracted 5/15/02.     COLONOSCOPY  12/29/2011    Diverticuli, 3 mm tissue biopsied, path showed no hyperplastic or adenomatous elements     CYSTOSCOPY       ENT SURGERY       GENITOURINARY SURGERY       HEAD & NECK SURGERY       HERNIORRHAPHY UMBILICAL N/A 9/21/2017    Procedure: HERNIORRHAPHY UMBILICAL;  open incarcerated ventral hernia repair with mesh;  Surgeon: Cristina Robbins MD;  Location: [facility] OR     IRRIGATION AND DEBRIDEMENT HIP, COMBINED Left 11/7/2015    Procedure: COMBINED IRRIGATION AND DEBRIDEMENT HIP;  Surgeon: Stanislav Maharaj MD;  Location: [facility] OR     left pelvis hip repaired (after 8 foot fall)       ORTHOPEDIC SURGERY       PHACOEMULSIFICATION CLEAR CORNEA WITH TORIC INTRAOCULAR LENS IMPLANT  5/23/2012    Procedure:PHACOEMULSIFICATION CLEAR CORNEA WITH TORIC INTRAOCULAR LENS IMPLANT; RIGHT PHACOEMULSIFICATION CLEAR CORNEA WITH  DELUXE  TORIC INTRAOCULAR LENS IMPLANT; Surgeon:ANDRA LAGUNAS; Location:[facility]     PHACOEMULSIFICATION CLEAR CORNEA WITH TORIC INTRAOCULAR LENS IMPLANT  2012    Procedure:PHACOEMULSIFICATION CLEAR CORNEA WITH TORIC INTRAOCULAR LENS IMPLANT; LEFT PHACOEMULSIFICATION CLEAR CORNEA WITH TORIC INTRAOCULAR LENS IMPLANT ; Surgeon:ANDRA LAGUNAS; Location:[facility]     PROSTATE SURGERY  2010     adwoa placed for left arm fracture       VITRECTOMY PARSPLANA WITH 23 GAUGE SYSTEM  2013    Procedure: VITRECTOMY PARSPLANA WITH 23 GAUGE SYSTEM;  LEFT 23 GAUGE VITRECTOMY, EPIRETINAL MEMBRANE REMOVAL, AIR FLUID EXCHANGE ;  Surgeon: Lawson Celeste MD;  Location: [facility]     VITRECTOMY PARSPLANA WITH 23 GAUGE SYSTEM  2014    Procedure: VITRECTOMY PARSPLANA WITH 23 GAUGE SYSTEM;  RIGHT VITRECTOMY PARSPLANA WITH 23 GAUGE SYSTEM, EPIRETINAL MEMBRANE REMOVAL, AIR/FLUID EXCHANGE ;  Surgeon: Lawson Celeste MD;  Location: [facility]       FAMILY HISTORY:   Family History   Problem Relation Age of Onset     Neurologic Disorder Father       age 70, ALS, laryngeal CA     C.A.D. Father      HEART DISEASE Father      Hyperlipidemia Father      Hypertension Father      Family History Negative Mother       age 99 after hip fx, was almost 100     Hypertension Mother      Hyperlipidemia Mother      HEART DISEASE Sister      Born 1931     Coronary Artery Disease Sister      Hypertension Sister      Arthritis Sister      Born 1933     Hypertension Daughter        SOCIAL HISTORY:   Social History   Substance Use Topics     Smoking status: Former Smoker     Packs/day: 1.00     Years: 10.00     Types: Cigarettes, Pipe     Quit date: 1972     Smokeless tobacco: Never Used     Alcohol use Yes      Comment: Infrequent wine/beer       Current Outpatient Prescriptions   Medication     acetaminophen (TYLENOL) 325 MG tablet     amLODIPine (NORVASC) 2.5 MG tablet     aspirin 81 MG tablet     cetirizine (ZYRTEC) 10 MG tablet     clotrimazole  "(LOTRIMIN) 1 % cream     docusate calcium (SURFAK) 240 MG capsule     folic acid (FOLVITE) 1 MG tablet     InFLIXimab (REMICADE IV)     Menthol, Topical Analgesic, 2 % GEL     methotrexate 2.5 MG tablet     Multiple Vitamins-Minerals (PRESERVISION AREDS PO)     naproxen sodium (ALEVE) 220 MG tablet     senna-docusate (SENOKOT-S;PERICOLACE) 8.6-50 MG per tablet     simvastatin (ZOCOR) 20 MG tablet     triamterene-hydrochlorothiazide (MAXZIDE-25) 37.5-25 MG per tablet     No current facility-administered medications for this visit.          PHYSICAL EXAM:    Pulse 56  Ht 1.753 m (5' 9\")  Wt 101.6 kg (224 lb)  SpO2 98%  BMI 33.08 kg/m2    Constitutional: Well developed. Conversant and in no acute distress  Eyes: Anicteric sclera, conjunctiva clear, normal extraocular movements  ENT: Normocephalic and atraumatic,   Skin: Warm and dry. No rashes or lesions  Cardiac: No peripheral edema  Back/Flank: Not done  CNS/PNS: Normal musculature and movements, moves all extremities normally  Respiratory: Normal non-labored breathing  Abdomen: Soft nontender and nondistended  Peripheral Vascular: No peripheral edema  Mental Status/Psych: Alert and Oriented x 3. Normal mood and affect    Penis: Not done  Scrotal Skin: Not done  Testicles: Not done  Epididymis: Not done  Digital Rectal Exam:     Cystoscopy: Not done    Imaging: None    Urinalysis: UA RESULTS:  Recent Labs   Lab Test  09/16/17   2143  12/08/16   1530   COLOR  Straw  Yellow   APPEARANCE  Clear  Clear   URINEGLC  Negative  Negative   URINEBILI  Negative  Negative   URINEKETONE  Negative  Negative   SG  1.014  1.020   UBLD  Negative  Negative   URINEPH  6.0  6.5   PROTEIN  Negative  Negative   UROBILINOGEN   --   1.0   NITRITE  Negative  Negative   LEUKEST  Negative  Negative   RBCU  1  O - 2   WBCU  <1  O - 2       PSA: 0.52    Post Void Residual:     Other labs: None today      IMPRESSION:   PSA stable    PLAN:   His PSA remains low and stable. He appears to be " doing well after his cryoablation for prostate cancer. I will plan on seeing him back again in one year to check another PSA.      Bhavin Celeste M.D.

## 2018-10-30 ENCOUNTER — MYC MEDICAL ADVICE (OUTPATIENT)
Dept: INTERNAL MEDICINE | Facility: CLINIC | Age: 83
End: 2018-10-30

## 2018-11-01 NOTE — TELEPHONE ENCOUNTER
Patient sent update regarding BP since increasing Amlodipine to 10 mg. Per patient BP is still up and down and feels they are generally higher than they were 6 months ago, but his headache is gone now.     BP readings:   10/10 - /91; P 53  10/12 - /94; P 60  10/14 - /66; P 66  10/15 - /100; P 69  10/16 - /91; P 53  10/21 - /105; P 59  10/26 - /86; P 55  10/29 - /83; P 63    Patient states BP was okay on 10/17, 10/19, 10/20, 10/22 and 10/23. Patient asks for recommendations - he has about 3 weeks of Amlodipine at home.

## 2018-11-06 NOTE — TELEPHONE ENCOUNTER
See his my chart message.     He was getting edema from the higher dose of Amlodipine.     Please advise.

## 2018-11-06 NOTE — TELEPHONE ENCOUNTER
The blood pressure can be controlled by 5 mg , but he needs recheck to make sure of it.   Ankle swelling if mild is common side effect of the Amlodipine.   If the swelling is increasing, suggest to be checked in clinic.

## 2018-11-07 ENCOUNTER — MYC MEDICAL ADVICE (OUTPATIENT)
Dept: INTERNAL MEDICINE | Facility: CLINIC | Age: 83
End: 2018-11-07

## 2018-11-07 DIAGNOSIS — I10 ESSENTIAL HYPERTENSION, BENIGN: ICD-10-CM

## 2018-11-13 RX ORDER — AMLODIPINE BESYLATE 2.5 MG/1
5 TABLET ORAL DAILY
Qty: 180 TABLET | Refills: 1 | Status: SHIPPED | OUTPATIENT
Start: 2018-11-13 | End: 2019-04-24

## 2018-11-13 NOTE — TELEPHONE ENCOUNTER
"Patient requesting refill for Amloodipine. Currently taking 5mg (dose increased at last office visit), but asks that we refill the 2.5 mg in case he has to decrease dose back down.     Requested Prescriptions   Pending Prescriptions Disp Refills     amLODIPine (NORVASC) 2.5 MG tablet  Last Written Prescription Date:  10/9/18 (no print out)  Last Fill Quantity: 180,  # refills: 0   Last office visit: 10/9/2018 with prescribing provider:  Dr. Vasquez   Future Office Visit:    180 tablet 0     Sig: Take 2 tablets (5 mg) by mouth daily    Calcium Channel Blockers Protocol  Passed    11/13/2018  7:57 AM       Passed - Blood pressure under 140/90 in past 12 months    BP Readings from Last 3 Encounters:   10/09/18 128/68   10/08/18 167/80   07/16/18 150/81                Passed - Recent (12 mo) or future (30 days) visit within the authorizing provider's specialty    Patient had office visit in the last 12 months or has a visit in the next 30 days with authorizing provider or within the authorizing provider's specialty.  See \"Patient Info\" tab in inbasket, or \"Choose Columns\" in Meds & Orders section of the refill encounter.             Passed - Patient is age 18 or older       Passed - Normal serum creatinine on file in past 12 months    Recent Labs   Lab Test  10/08/18   2058   CR  0.88           Prescription approved per [facility] Refill Protocol.   "

## 2019-01-28 ENCOUNTER — NURSE TRIAGE (OUTPATIENT)
Dept: NURSING | Facility: CLINIC | Age: 84
End: 2019-01-28

## 2019-01-29 NOTE — TELEPHONE ENCOUNTER
Was helping his neighbor on Sat evening and turns out she has Norovirus. He does not have any symptoms at this point, just wondering what to watch for. I gave him information from [facility] Norovirus fact sheet.  He will call back with further questions or concerns.     Tanisha Broussard RN, [facility]    Additional Information    Negative: [1] Caller is not with the adult (patient) AND [2] reporting urgent symptoms    Negative: Lab result questions    Negative: Medication questions    Negative: Caller cannot be reached by phone    Negative: Caller has already spoken to PCP or another triager    Negative: RN needs further essential information from caller in order to complete triage    Negative: Requesting regular office appointment    Negative: [1] Caller requesting NON-URGENT health information AND [2] PCP's office is the best resource    Health Information question, no triage required and triager able to answer question    Protocols used: IN[facility] ONLY [facility]

## 2019-03-04 ENCOUNTER — DOCUMENTATION ONLY (OUTPATIENT)
Dept: SLEEP MEDICINE | Facility: CLINIC | Age: 84
End: 2019-03-04

## 2019-03-04 NOTE — PROGRESS NOTES
Dr. Maria signed the critical life sustaining medical equipment for for [employer]. It was put in the mail  On 03/05/19.    Opal Fontenot

## 2019-03-26 DIAGNOSIS — E78.2 MIXED HYPERLIPIDEMIA: ICD-10-CM

## 2019-03-27 NOTE — TELEPHONE ENCOUNTER
"Requested Prescriptions   Pending Prescriptions Disp Refills     simvastatin (ZOCOR) 20 MG tablet [Pharmacy Med Name: SIMVASTATIN 20 MG Tablet] 90 tablet 3    Last Written Prescription Date:  06/08/2018  Last Fill Quantity: 90,  # refills: 3   Last office visit: 10/9/2018 with prescribing provider:     Future Office Visit:   Sig: TAKE 1 TABLET AT BEDTIME    Statins Protocol Passed - 3/26/2019  4:03 PM       Passed - LDL on file in past 12 months    Recent Labs   Lab Test 05/31/18  1055   LDL 68            Passed - No abnormal creatine kinase in past 12 months    No lab results found.            Passed - Recent (12 mo) or future (30 days) visit within the authorizing provider's specialty    Patient had office visit in the last 12 months or has a visit in the next 30 days with authorizing provider or within the authorizing provider's specialty.  See \"Patient Info\" tab in inbasket, or \"Choose Columns\" in Meds & Orders section of the refill encounter.             Passed - Medication is active on med list       Passed - Patient is age 18 or older        "

## 2019-03-28 RX ORDER — SIMVASTATIN 20 MG
TABLET ORAL
Qty: 90 TABLET | Refills: 0 | Status: SHIPPED | OUTPATIENT
Start: 2019-03-28 | End: 2019-05-30

## 2019-03-28 NOTE — TELEPHONE ENCOUNTER
Prescription approved per [facility] Refill Protocol.    Patient due for labs in May. Lipid panel pended, primary care provider please sign, and add additional labs if needed. Please advise on follow up.

## 2019-05-30 DIAGNOSIS — E78.2 MIXED HYPERLIPIDEMIA: ICD-10-CM

## 2019-05-30 RX ORDER — SIMVASTATIN 20 MG
TABLET ORAL
Qty: 90 TABLET | Refills: 0 | Status: SHIPPED | OUTPATIENT
Start: 2019-05-30 | End: 2019-08-06

## 2019-05-30 NOTE — LETTER
[facility]  [address]  [place] [address]  Phone: [phone]        June 13, 2019      Raul Younger                                                                                                                                [address]  [address]  [place] [address]            Dear Mr. Younger,    We are concerned about your health care.  We recently provided you with a medication refill.  Many medications require routine follow-up with your Doctor.      At this time we ask that: You schedule a lab appointment for your routine fasting labs for medication monitoring.        Your prescription: Has been refilled once so you may have time for the above noted follow-up.      Thank you,      Dr. Jose D Vasquez

## 2019-05-30 NOTE — TELEPHONE ENCOUNTER
Medication is being filled for 1 time refill only due to:  Future labs ordered lipids due..   [employer] message sent.  Ayanna Patterson RN

## 2019-05-30 NOTE — TELEPHONE ENCOUNTER
"Requested Prescriptions   Pending Prescriptions Disp Refills     simvastatin (ZOCOR) 20 MG tablet [Pharmacy Med Name: SIMVASTATIN 20 MG Tablet] 90 tablet 0     Sig: TAKE 1 TABLET AT BEDTIME  Last Written Prescription Date:  3/28/2019  Last Fill Quantity: 90tablet,  # refills: 0   Last Office Visit: 10/9/2018 Pedro  Future Office Visit:            Statins Protocol Passed - 5/30/2019  2:01 PM        Passed - LDL on file in past 12 months     Recent Labs   Lab Test 05/31/18  1055   LDL 68           Passed - No abnormal creatine kinase in past 12 months     No lab results found.           Passed - Recent (12 mo) or future (30 days) visit within the authorizing provider's specialty     Patient had office visit in the last 12 months or has a visit in the next 30 days with authorizing provider or within the authorizing provider's specialty.  See \"Patient Info\" tab in inbasket, or \"Choose Columns\" in Meds & Orders section of the refill encounter.              Passed - Medication is active on med list        Passed - Patient is age 18 or older          "

## 2019-06-28 ENCOUNTER — TELEPHONE (OUTPATIENT)
Dept: INTERNAL MEDICINE | Facility: CLINIC | Age: 84
End: 2019-06-28

## 2019-06-28 DIAGNOSIS — Z00.00 ROUTINE GENERAL MEDICAL EXAMINATION AT A HEALTH CARE FACILITY: Primary | ICD-10-CM

## 2019-06-28 DIAGNOSIS — I10 ESSENTIAL HYPERTENSION, BENIGN: ICD-10-CM

## 2019-06-28 DIAGNOSIS — E78.2 MIXED HYPERLIPIDEMIA: ICD-10-CM

## 2019-06-28 NOTE — TELEPHONE ENCOUNTER
Patient calls wanting to make certain that lab orders are entered for his appointment on 07/02/19.

## 2019-07-05 DIAGNOSIS — Z00.00 ROUTINE GENERAL MEDICAL EXAMINATION AT A HEALTH CARE FACILITY: ICD-10-CM

## 2019-07-05 DIAGNOSIS — E78.2 MIXED HYPERLIPIDEMIA: ICD-10-CM

## 2019-07-05 LAB
ALBUMIN SERPL-MCNC: 4 G/DL (ref 3.4–5)
ALP SERPL-CCNC: 90 U/L (ref 40–150)
ALT SERPL W P-5'-P-CCNC: 20 U/L (ref 0–70)
ANION GAP SERPL CALCULATED.3IONS-SCNC: 10 MMOL/L (ref 3–14)
AST SERPL W P-5'-P-CCNC: 13 U/L (ref 0–45)
BILIRUB SERPL-MCNC: 0.7 MG/DL (ref 0.2–1.3)
BUN SERPL-MCNC: 20 MG/DL (ref 7–30)
CALCIUM SERPL-MCNC: 8.9 MG/DL (ref 8.5–10.1)
CHLORIDE SERPL-SCNC: 109 MMOL/L (ref 94–109)
CHOLEST SERPL-MCNC: 140 MG/DL
CO2 SERPL-SCNC: 23 MMOL/L (ref 20–32)
CREAT SERPL-MCNC: 1.04 MG/DL (ref 0.66–1.25)
ERYTHROCYTE [DISTWIDTH] IN BLOOD BY AUTOMATED COUNT: 14.4 % (ref 10–15)
GFR SERPL CREATININE-BSD FRML MDRD: 67 ML/MIN/{1.73_M2}
GLUCOSE SERPL-MCNC: 84 MG/DL (ref 70–99)
HCT VFR BLD AUTO: 39 % (ref 40–53)
HDLC SERPL-MCNC: 56 MG/DL
HGB BLD-MCNC: 12.6 G/DL (ref 13.3–17.7)
LDLC SERPL CALC-MCNC: 58 MG/DL
MCH RBC QN AUTO: 30.3 PG (ref 26.5–33)
MCHC RBC AUTO-ENTMCNC: 32.3 G/DL (ref 31.5–36.5)
MCV RBC AUTO: 94 FL (ref 78–100)
NONHDLC SERPL-MCNC: 84 MG/DL
PLATELET # BLD AUTO: 212 10E9/L (ref 150–450)
POTASSIUM SERPL-SCNC: 4.2 MMOL/L (ref 3.4–5.3)
PROT SERPL-MCNC: 6.8 G/DL (ref 6.8–8.8)
RBC # BLD AUTO: 4.16 10E12/L (ref 4.4–5.9)
SODIUM SERPL-SCNC: 142 MMOL/L (ref 133–144)
TRIGL SERPL-MCNC: 130 MG/DL
TSH SERPL DL<=0.005 MIU/L-ACNC: 1.21 MU/L (ref 0.4–4)
WBC # BLD AUTO: 6.3 10E9/L (ref 4–11)

## 2019-07-05 PROCEDURE — 80061 LIPID PANEL: CPT | Performed by: INTERNAL MEDICINE

## 2019-07-05 PROCEDURE — 80053 COMPREHEN METABOLIC PANEL: CPT | Performed by: INTERNAL MEDICINE

## 2019-07-05 PROCEDURE — 85027 COMPLETE CBC AUTOMATED: CPT | Performed by: INTERNAL MEDICINE

## 2019-07-05 PROCEDURE — 36415 COLL VENOUS BLD VENIPUNCTURE: CPT | Performed by: INTERNAL MEDICINE

## 2019-07-05 PROCEDURE — 84443 ASSAY THYROID STIM HORMONE: CPT | Performed by: INTERNAL MEDICINE

## 2019-07-21 ENCOUNTER — MYC MEDICAL ADVICE (OUTPATIENT)
Dept: INTERNAL MEDICINE | Facility: CLINIC | Age: 84
End: 2019-07-21

## 2019-08-01 ENCOUNTER — OFFICE VISIT (OUTPATIENT)
Dept: SLEEP MEDICINE | Facility: CLINIC | Age: 84
End: 2019-08-01
Payer: COMMERCIAL

## 2019-08-01 VITALS
OXYGEN SATURATION: 96 % | RESPIRATION RATE: 16 BRPM | BODY MASS INDEX: 33.03 KG/M2 | WEIGHT: 223 LBS | SYSTOLIC BLOOD PRESSURE: 130 MMHG | HEIGHT: 69 IN | DIASTOLIC BLOOD PRESSURE: 73 MMHG | HEART RATE: 63 BPM

## 2019-08-01 DIAGNOSIS — G47.33 OSA (OBSTRUCTIVE SLEEP APNEA): Primary | ICD-10-CM

## 2019-08-01 PROCEDURE — 99213 OFFICE O/P EST LOW 20 MIN: CPT | Performed by: INTERNAL MEDICINE

## 2019-08-01 ASSESSMENT — MIFFLIN-ST. JEOR: SCORE: 1706.9

## 2019-08-01 NOTE — PATIENT INSTRUCTIONS
Your BMI is Body mass index is 32.93 kg/m .  Weight management is a personal decision.  If you are interested in exploring weight loss strategies, the following discussion covers the approaches that may be successful. Body mass index (BMI) is one way to tell whether you are at a healthy weight, overweight, or obese. It measures your weight in relation to your height.  A BMI of 18.5 to 24.9 is in the healthy range. A person with a BMI of 25 to 29.9 is considered overweight, and someone with a BMI of 30 or greater is considered obese. More than two-thirds of American adults are considered overweight or obese.  Being overweight or obese increases the risk for further weight gain. Excess weight may lead to heart disease and diabetes.  Creating and following plans for healthy eating and physical activity may help you improve your health.  Weight control is part of healthy lifestyle and includes exercise, emotional health, and healthy eating habits. Careful eating habits lifelong are the mainstay of weight control. Though there are significant health benefits from weight loss, long-term weight loss with diet alone may be very difficult to achieve- studies show long-term success with dietary management in less than 10% of people. Attaining a healthy weight may be especially difficult to achieve in those with severe obesity. In some cases, medications, devices and surgical management might be considered.  What can you do?  If you are overweight or obese and are interested in methods for weight loss, you should discuss this with your provider.     Consider reducing daily calorie intake by 500 calories.     Keep a food journal.     Avoiding skipping meals, consider cutting portions instead.    Diet combined with exercise helps maintain muscle while optimizing fat loss. Strength training is particularly important for building and maintaining muscle mass. Exercise helps reduce stress, increase energy, and improves fitness.  Increasing exercise without diet control, however, may not burn enough calories to loose weight.       Start walking three days a week 10-20 minutes at a time    Work towards walking thirty minutes five days a week     Eventually, increase the speed of your walking for 1-2 minutes at time    In addition, we recommend that you review healthy lifestyles and methods for weight loss available through the National Institutes of Health patient information sites:  http://win.niddk.nih.gov/publications/index.htm    And look into health and wellness programs that may be available through your health insurance provider, employer, local community center, or li club.    Weight management plan: Patient was referred to their PCP to discuss a diet and exercise plan.        Your Body mass index is 32.93 kg/m .  Weight management is a personal decision.  If you are interested in exploring weight loss strategies, the following discussion covers the approaches that may be successful. Body mass index (BMI) is one way to tell whether you are at a healthy weight, overweight, or obese. It measures your weight in relation to your height.  A BMI of 18.5 to 24.9 is in the healthy range. A person with a BMI of 25 to 29.9 is considered overweight, and someone with a BMI of 30 or greater is considered obese. More than two-thirds of American adults are considered overweight or obese.  Being overweight or obese increases the risk for further weight gain. Excess weight may lead to heart disease and diabetes.  Creating and following plans for healthy eating and physical activity may help you improve your health.  Weight control is part of healthy lifestyle and includes exercise, emotional health, and healthy eating habits. Careful eating habits lifelong are the mainstay of weight control. Though there are significant health benefits from weight loss, long-term weight loss with diet alone may be very difficult to achieve- studies show long-term  success with dietary management in less than 10% of people. Attaining a healthy weight may be especially difficult to achieve in those with severe obesity. In some cases, medications, devices and surgical management might be considered.  What can you do?  If you are overweight or obese and are interested in methods for weight loss, you should discuss this with your provider.     Consider reducing daily calorie intake by 500 calories.     Keep a food journal.     Avoiding skipping meals, consider cutting portions instead.    Diet combined with exercise helps maintain muscle while optimizing fat loss. Strength training is particularly important for building and maintaining muscle mass. Exercise helps reduce stress, increase energy, and improves fitness. Increasing exercise without diet control, however, may not burn enough calories to loose weight.       Start walking three days a week 10-20 minutes at a time    Work towards walking thirty minutes five days a week     Eventually, increase the speed of your walking for 1-2 minutes at time    In addition, we recommend that you review healthy lifestyles and methods for weight loss available through the National Institutes of Health patient information sites:  http://win.niddk.nih.gov/publications/index.htm    And look into health and wellness programs that may be available through your health insurance provider, employer, local community center, or li club.    Weight management plan: Patient was referred to their PCP to discuss a diet and exercise plan.

## 2019-08-01 NOTE — PROGRESS NOTES
"  Obstructive Sleep Apnea - PAP Follow-Up Visit:    Chief Complaint   Patient presents with     CPAP Follow Up       Raul Younger comes in today for follow-up of their mild sleep apnea, managed with CPAP.      His sleep study from 2007 showed an apnea-hypopnea index of 11.4 per hour and RDI of 25.5 per hour.     Medical history is significant for hypertension and rheumatoid arthritis.     Overall, he rates the experience with PAP as 10 (0 poor, 10 great). The mask is comfortable. The mask is not leaking.  He is not snoring with the mask on. He is not having gasp arousals.  He is not having significant oral/nasal dryness. The pressure settings are comfortable.     He uses nasal mask.     Bedtime is typically 10 pm. Usually it takes about 10 minutes to fall asleep with the mask on. Wake time is typically 6 am.  Patient is using PAP therapy 7.5 hours per night. The patient is usually getting 7-8 hours of sleep per night.    He does feel rested in the morning.    Total score - [place]: 9 (8/1/2019  1:28 PM)      Respironics  CPAP 12 cmH2O download:  30/30 total days of use. 0 nonuse days.  Average use 7 hours 29 minutes per day.  30/30 days with >4 hours use.  Large leak 1 hrs  per day average.  AHI 0.8.       Reviewed by team: Tobacco  Allergies       Reviewed by provider:        Problem List:  Patient Active Problem List    Diagnosis Date Noted     RA (rheumatoid arthritis) (H) 02/19/2015     Priority: Medium     2014. Treat with Pulse doses of Prednisone by Rheumatologist for \"exacerbations/flare-ups\"       History of colonic polyps 02/19/2015     Priority: Medium     Diagnosed 12/2011 due to for colonoscopy 2016  SPECIMEN(S):  Colon polyp ascending    FINAL DIAGNOSIS:  Ascending colon polyp, biopsy - Small fragment of mucosa without  diagnostic evidence of hyperplastic or adenomatous polyp. Negative for  dysplasia and malignancy.  Problem list name updated by automated process. Provider to review       " "Advanced directives, counseling/discussion 12/08/2011     Priority: Medium     Pt already has a HCD and will bring in a copy.     Shauna Covarrubiasjulia Mosquera MA         HYPERLIPIDEMIA LDL GOAL <130 10/31/2010     Priority: Medium     Prostate cancer (H) 01/26/2010     Priority: Medium     Cryosurgery  January 2011       Chronic rhinitis 06/02/2008     Priority: Medium     (Problem list name updated by automated process. Provider to review and confirm.)       Essential hypertension, benign 02/06/2003     Priority: Medium          /73   Pulse 63   Resp 16   Ht 1.753 m (5' 9\")   Wt 101.2 kg (223 lb)   SpO2 96%   BMI 32.93 kg/m      Impression/Plan:     Mild sleep apnea.   Hypertension     -  Tolerating PAP well. Daytime symptoms are stable. Regular compliance and normal AHI is demonstrated on download.     Plan:     1. Continue CPAP therapy     Raul TANJA Younger will follow up in about 1 year(s).     Fifteen minutes spent with patient, all of which were spent face-to-face counseling, consulting, coordinating plan of care.      Yoan Maria MD, MD    CC:  Jose D Vasquez,   "

## 2019-08-06 DIAGNOSIS — E78.2 MIXED HYPERLIPIDEMIA: ICD-10-CM

## 2019-08-06 NOTE — TELEPHONE ENCOUNTER
"Requested Prescriptions   Pending Prescriptions Disp Refills     simvastatin (ZOCOR) 20 MG tablet [Pharmacy Med Name: SIMVASTATIN 20 MG  Last Written Prescription Date:  5/30/2019  Last Fill Quantity: 90,  # refills: 0   Last office visit: 10/9/2018 with prescribing provider:     Future Office Visit:   Tablet] 90 tablet 0     Sig: TAKE 1 TABLET AT BEDTIME       Statins Protocol Passed - 8/6/2019  1:25 AM        Passed - LDL on file in past 12 months     Recent Labs   Lab Test 07/05/19  1319   LDL 58             Passed - No abnormal creatine kinase in past 12 months     No lab results found.             Passed - Recent (12 mo) or future (30 days) visit within the authorizing provider's specialty     Patient had office visit in the last 12 months or has a visit in the next 30 days with authorizing provider or within the authorizing provider's specialty.  See \"Patient Info\" tab in inbasket, or \"Choose Columns\" in Meds & Orders section of the refill encounter.              Passed - Medication is active on med list        Passed - Patient is age 18 or older        "

## 2019-08-07 RX ORDER — SIMVASTATIN 20 MG
TABLET ORAL
Qty: 90 TABLET | Refills: 0 | Status: SHIPPED | OUTPATIENT
Start: 2019-08-07 | End: 2020-02-13

## 2019-08-07 NOTE — TELEPHONE ENCOUNTER
Medication is being filled for 1 time refill only due to:  Patient needs to be seen before next refill, due in October 2019.  BLADIMIR Tapia R.N.

## 2019-09-29 ENCOUNTER — HEALTH MAINTENANCE LETTER (OUTPATIENT)
Age: 84
End: 2019-09-29

## 2019-11-07 DIAGNOSIS — C61 MALIGNANT NEOPLASM OF PROSTATE (H): ICD-10-CM

## 2019-11-07 PROCEDURE — 84153 ASSAY OF PSA TOTAL: CPT | Performed by: UROLOGY

## 2019-11-07 PROCEDURE — 36415 COLL VENOUS BLD VENIPUNCTURE: CPT | Performed by: UROLOGY

## 2019-11-08 LAB — PSA SERPL-MCNC: 0.56 UG/L (ref 0–4)

## 2019-11-11 ENCOUNTER — OFFICE VISIT (OUTPATIENT)
Dept: UROLOGY | Facility: CLINIC | Age: 84
End: 2019-11-11
Payer: COMMERCIAL

## 2019-11-11 VITALS
HEART RATE: 60 BPM | HEIGHT: 69 IN | DIASTOLIC BLOOD PRESSURE: 66 MMHG | SYSTOLIC BLOOD PRESSURE: 122 MMHG | WEIGHT: 223 LBS | BODY MASS INDEX: 33.03 KG/M2

## 2019-11-11 DIAGNOSIS — C61 PROSTATE CANCER (H): Primary | ICD-10-CM

## 2019-11-11 PROCEDURE — 99213 OFFICE O/P EST LOW 20 MIN: CPT | Performed by: UROLOGY

## 2019-11-11 RX ORDER — FEXOFENADINE HCL 60 MG/1
60 TABLET, FILM COATED ORAL 2 TIMES DAILY
COMMUNITY

## 2019-11-11 ASSESSMENT — PAIN SCALES - GENERAL: PAINLEVEL: NO PAIN (0)

## 2019-11-11 ASSESSMENT — MIFFLIN-ST. JEOR: SCORE: 1701.9

## 2019-11-11 NOTE — LETTER
11/11/2019       RE: Raul AGRAWAL Carisa  [address]  [place] [address]     Dear Colleague,    Thank you for referring your patient, Raul Younger, to the [facility] [place] at [facility]. Please see a copy of my visit note below.    Office Visit Note  [facility]  [phone]    UROLOGIC DIAGNOSES:   [place] 4+4 = 8 prostate cancer with PSA recurrence    CURRENT INTERVENTIONS:   Cryoablation of the prostate in 2010    HISTORY:   Raul returns to clinic today for prostate cancer follow-up.  His PSA remains stable at 0.56.  He continues to have no urinary symptoms or complaints.      PAST MEDICAL HISTORY:   Past Medical History:   Diagnosis Date     Arthritis      HTN      Malignant neoplasm (H) 01/2011    Prostate cancer     MI, old     vs. asthma per pt.'s wife     Migraine, unspecified, without mention of intractable migraine without mention of status migrainosus     Abstracted 5/15/02.     Other and unspecified hyperlipidemia     Abstracted 5/15/02.     Other and unspecified hyperlipidemia      Sleep apnea        PAST SURGICAL HISTORY:   Past Surgical History:   Procedure Laterality Date     ABDOMEN SURGERY  1980    diaphram repair     C NONSPECIFIC PROCEDURE  1996    Paraesophageal Hernia Repair. Abstracted 5/15/02.     C NONSPECIFIC PROCEDURE  1943    Tonsillectomy. Abstracted 5/15/02.     COLONOSCOPY  12/29/2011    Diverticuli, 3 mm tissue biopsied, path showed no hyperplastic or adenomatous elements     CYSTOSCOPY       ENT SURGERY       GENITOURINARY SURGERY       HEAD & NECK SURGERY       HERNIORRHAPHY UMBILICAL N/A 9/21/2017    Procedure: HERNIORRHAPHY UMBILICAL;  open incarcerated ventral hernia repair with mesh;  Surgeon: Cristina Robbins MD;  Location: [facility] OR     IRRIGATION AND DEBRIDEMENT HIP, COMBINED Left 11/7/2015    Procedure: COMBINED IRRIGATION AND DEBRIDEMENT HIP;  Surgeon:  Stanislav Maharaj MD;  Location: [facility] OR     left pelvis hip repaired (after 8 foot fall)       ORTHOPEDIC SURGERY       PHACOEMULSIFICATION CLEAR CORNEA WITH TORIC INTRAOCULAR LENS IMPLANT  2012    Procedure:PHACOEMULSIFICATION CLEAR CORNEA WITH TORIC INTRAOCULAR LENS IMPLANT; RIGHT PHACOEMULSIFICATION CLEAR CORNEA WITH  DELUXE TORIC INTRAOCULAR LENS IMPLANT; Surgeon:ANDRA LAGUNAS; Location:[facility]     PHACOEMULSIFICATION CLEAR CORNEA WITH TORIC INTRAOCULAR LENS IMPLANT  2012    Procedure:PHACOEMULSIFICATION CLEAR CORNEA WITH TORIC INTRAOCULAR LENS IMPLANT; LEFT PHACOEMULSIFICATION CLEAR CORNEA WITH TORIC INTRAOCULAR LENS IMPLANT ; Surgeon:ANDRA LAGUNAS; Location:[facility]     PROSTATE SURGERY  2010     adwoa placed for left arm fracture       VITRECTOMY PARSPLANA WITH 23 GAUGE SYSTEM  2013    Procedure: VITRECTOMY PARSPLANA WITH 23 GAUGE SYSTEM;  LEFT 23 GAUGE VITRECTOMY, EPIRETINAL MEMBRANE REMOVAL, AIR FLUID EXCHANGE ;  Surgeon: Lawson Celeste MD;  Location: [facility]     VITRECTOMY PARSPLANA WITH 23 GAUGE SYSTEM  2014    Procedure: VITRECTOMY PARSPLANA WITH 23 GAUGE SYSTEM;  RIGHT VITRECTOMY PARSPLANA WITH 23 GAUGE SYSTEM, EPIRETINAL MEMBRANE REMOVAL, AIR/FLUID EXCHANGE ;  Surgeon: Lawson Celeste MD;  Location: [facility]       FAMILY HISTORY:   Family History   Problem Relation Age of Onset     Neurologic Disorder Father          age 70, ALS, laryngeal CA     C.A.D. Father      Heart Disease Father      Hyperlipidemia Father      Hypertension Father      Family History Negative Mother          age 99 after hip fx, was almost 100     Hypertension Mother      Hyperlipidemia Mother      Heart Disease Sister         Born 1931     Coronary Artery Disease Sister      Hypertension Sister      Arthritis Sister         Born 1933     Hypertension Daughter        SOCIAL HISTORY:   Social History     Tobacco Use     Smoking status: Former Smoker     Packs/day: 1.00     Years: 10.00     Pack years:  "10.00     Types: Cigarettes, Pipe     Last attempt to quit: 1972     Years since quittin.8     Smokeless tobacco: Never Used   Substance Use Topics     Alcohol use: Yes     Comment: Infrequent wine/beer       Current Outpatient Medications   Medication     acetaminophen (TYLENOL) 325 MG tablet     amLODIPine (NORVASC) 2.5 MG tablet     aspirin 81 MG tablet     clotrimazole (LOTRIMIN) 1 % cream     docusate calcium (SURFAK) 240 MG capsule     fexofenadine (ALLEGRA) 60 MG tablet     folic acid (FOLVITE) 1 MG tablet     InFLIXimab (REMICADE IV)     Menthol, Topical Analgesic, 2 % GEL     methotrexate 2.5 MG tablet     Multiple Vitamins-Minerals (PRESERVISION AREDS PO)     naproxen sodium (ALEVE) 220 MG tablet     senna-docusate (SENOKOT-S;PERICOLACE) 8.6-50 MG per tablet     simvastatin (ZOCOR) 20 MG tablet     triamterene-HCTZ (MAXZIDE-25) 37.5-25 MG tablet     No current facility-administered medications for this visit.          PHYSICAL EXAM:    /66 (BP Location: Right arm)   Pulse 60   Ht 1.753 m (5' 9\")   Wt 101.2 kg (223 lb)   BMI 32.93 kg/m       Constitutional: Well developed. Conversant and in no acute distress  Eyes: Anicteric sclera, conjunctiva clear, normal extraocular movements  ENT: Normocephalic and atraumatic,   Skin: Warm and dry. No rashes or lesions  Cardiac: No peripheral edema  Back/Flank: Not done  CNS/PNS: Normal musculature and movements, moves all extremities normally  Respiratory: Normal non-labored breathing  Abdomen: Soft nontender and nondistended  Peripheral Vascular: No peripheral edema  Mental Status/Psych: Alert and Oriented x 3. Normal mood and affect    Penis: Not done  Scrotal Skin: Not done  Testicles: Not done  Epididymis: Not done  Digital Rectal Exam:     Cystoscopy: Not done    Imaging: None    Urinalysis: UA RESULTS:  Recent Labs   Lab Test 17  2143 16  1530   COLOR Straw Yellow   APPEARANCE Clear Clear   URINEGLC Negative Negative   URINEBILI " Negative Negative   URINEKETONE Negative Negative   SG 1.014 1.020   UBLD Negative Negative   URINEPH 6.0 6.5   PROTEIN Negative Negative   UROBILINOGEN  --  1.0   NITRITE Negative Negative   LEUKEST Negative Negative   RBCU 1 O - 2   WBCU <1 O - 2       PSA: 0.56    Post Void Residual:     Other labs: None today      IMPRESSION:  Doing well    PLAN:  He is doing well from a prostate cancer standpoint with a stable PSA.  It has been nearly 10 years since his treatment for prostate cancer.  I recommended that we continue to follow his PSA every other year.  I will see him back in 2 years for his next PSA.      Bhavin Celeste M.D.

## 2019-11-11 NOTE — PROGRESS NOTES
Office Visit Note  [facility]  [phone]    UROLOGIC DIAGNOSES:   [place] 4+4 = 8 prostate cancer with PSA recurrence    CURRENT INTERVENTIONS:   Cryoablation of the prostate in 2010    HISTORY:   Raul returns to clinic today for prostate cancer follow-up.  His PSA remains stable at 0.56.  He continues to have no urinary symptoms or complaints.      PAST MEDICAL HISTORY:   Past Medical History:   Diagnosis Date     Arthritis      HTN      Malignant neoplasm (H) 01/2011    Prostate cancer     MI, old     vs. asthma per pt.'s wife     Migraine, unspecified, without mention of intractable migraine without mention of status migrainosus     Abstracted 5/15/02.     Other and unspecified hyperlipidemia     Abstracted 5/15/02.     Other and unspecified hyperlipidemia      Sleep apnea        PAST SURGICAL HISTORY:   Past Surgical History:   Procedure Laterality Date     ABDOMEN SURGERY  1980    diaphram repair     C NONSPECIFIC PROCEDURE  1996    Paraesophageal Hernia Repair. Abstracted 5/15/02.     C NONSPECIFIC PROCEDURE  1943    Tonsillectomy. Abstracted 5/15/02.     COLONOSCOPY  12/29/2011    Diverticuli, 3 mm tissue biopsied, path showed no hyperplastic or adenomatous elements     CYSTOSCOPY       ENT SURGERY       GENITOURINARY SURGERY       HEAD & NECK SURGERY       HERNIORRHAPHY UMBILICAL N/A 9/21/2017    Procedure: HERNIORRHAPHY UMBILICAL;  open incarcerated ventral hernia repair with mesh;  Surgeon: Cristina Robbins MD;  Location: [facility] OR     IRRIGATION AND DEBRIDEMENT HIP, COMBINED Left 11/7/2015    Procedure: COMBINED IRRIGATION AND DEBRIDEMENT HIP;  Surgeon: Stanislav Maharaj MD;  Location: [facility] OR     left pelvis hip repaired (after 8 foot fall)       ORTHOPEDIC SURGERY       PHACOEMULSIFICATION CLEAR CORNEA WITH TORIC INTRAOCULAR LENS IMPLANT  5/23/2012    Procedure:PHACOEMULSIFICATION CLEAR CORNEA WITH TORIC INTRAOCULAR LENS IMPLANT; RIGHT PHACOEMULSIFICATION CLEAR CORNEA WITH  DELUXE  TORIC INTRAOCULAR LENS IMPLANT; Surgeon:ANDRA LAGUNAS; [place] [place]     PHACOEMULSIFICATION CLEAR CORNEA WITH TORIC INTRAOCULAR LENS IMPLANT  2012    Procedure:PHACOEMULSIFICATION CLEAR CORNEA WITH TORIC INTRAOCULAR LENS IMPLANT; LEFT PHACOEMULSIFICATION CLEAR CORNEA WITH TORIC INTRAOCULAR LENS IMPLANT ; Surgeon:ANDRA LAGUNAS; Location[place] [place]     PROSTATE SURGERY  2010     adwoa placed for left arm fracture       VITRECTOMY PARSPLANA WITH 23 GAUGE SYSTEM  2013    Procedure: VITRECTOMY PARSPLANA WITH 23 GAUGE SYSTEM;  LEFT 23 GAUGE VITRECTOMY, EPIRETINAL MEMBRANE REMOVAL, AIR FLUID EXCHANGE ;  Surgeon: Lawson Celeste MD;  Location: [facility] [place]     VITRECTOMY PARSPLANA WITH 23 GAUGE SYSTEM  2014    Procedure: VITRECTOMY PARSPLANA WITH 23 GAUGE SYSTEM;  RIGHT VITRECTOMY PARSPLANA WITH 23 GAUGE SYSTEM, EPIRETINAL MEMBRANE REMOVAL, AIR/FLUID EXCHANGE ;  Surgeon: Lawson Celeste MD;  Location: [facility] [place]       FAMILY HISTORY:   Family History   Problem Relation Age of Onset     Neurologic Disorder Father          age 70, ALS, laryngeal CA     C.A.D. Father      Heart Disease Father      Hyperlipidemia Father      Hypertension Father      Family History Negative Mother          age 99 after hip fx, was almost 100     Hypertension Mother      Hyperlipidemia Mother      Heart Disease Sister         Born 1929     Coronary Artery Disease Sister      Hypertension Sister      Arthritis Sister         Born 1933     Hypertension Daughter        SOCIAL HISTORY:   Social History     Tobacco Use     Smoking status: Former Smoker     Packs/day: 1.00     Years: 10.00     Pack years: 10.00     Types: Cigarettes, Pipe     Last attempt to quit: 1972     Years since quittin.8     Smokeless tobacco: Never Used   Substance Use Topics     Alcohol use: Yes     Comment: Infrequent wine/beer       Current Outpatient Medications   Medication     acetaminophen (TYLENOL) 325 MG tablet     amLODIPine (NORVASC) 2.5 MG  "tablet     aspirin 81 MG tablet     clotrimazole (LOTRIMIN) 1 % cream     docusate calcium (SURFAK) 240 MG capsule     fexofenadine (ALLEGRA) 60 MG tablet     folic acid (FOLVITE) 1 MG tablet     InFLIXimab (REMICADE IV)     Menthol, Topical Analgesic, 2 % GEL     methotrexate 2.5 MG tablet     Multiple Vitamins-Minerals (PRESERVISION AREDS PO)     naproxen sodium (ALEVE) 220 MG tablet     senna-docusate (SENOKOT-S;PERICOLACE) 8.6-50 MG per tablet     simvastatin (ZOCOR) 20 MG tablet     triamterene-HCTZ (MAXZIDE-25) 37.5-25 MG tablet     No current facility-administered medications for this visit.          PHYSICAL EXAM:    /66 (BP Location: Right arm)   Pulse 60   Ht 1.753 m (5' 9\")   Wt 101.2 kg (223 lb)   BMI 32.93 kg/m      Constitutional: Well developed. Conversant and in no acute distress  Eyes: Anicteric sclera, conjunctiva clear, normal extraocular movements  ENT: Normocephalic and atraumatic,   Skin: Warm and dry. No rashes or lesions  Cardiac: No peripheral edema  Back/Flank: Not done  CNS/PNS: Normal musculature and movements, moves all extremities normally  Respiratory: Normal non-labored breathing  Abdomen: Soft nontender and nondistended  Peripheral Vascular: No peripheral edema  Mental Status/Psych: Alert and Oriented x 3. Normal mood and affect    Penis: Not done  Scrotal Skin: Not done  Testicles: Not done  Epididymis: Not done  Digital Rectal Exam:     Cystoscopy: Not done    Imaging: None    Urinalysis: UA RESULTS:  Recent Labs   Lab Test 09/16/17  2143 12/08/16  1530   COLOR Straw Yellow   APPEARANCE Clear Clear   URINEGLC Negative Negative   URINEBILI Negative Negative   URINEKETONE Negative Negative   SG 1.014 1.020   UBLD Negative Negative   URINEPH 6.0 6.5   PROTEIN Negative Negative   UROBILINOGEN  --  1.0   NITRITE Negative Negative   LEUKEST Negative Negative   RBCU 1 O - 2   WBCU <1 O - 2       PSA: 0.56    Post Void Residual:     Other labs: None today      IMPRESSION:  Doing " well    PLAN:  He is doing well from a prostate cancer standpoint with a stable PSA.  It has been nearly 10 years since his treatment for prostate cancer.  I recommended that we continue to follow his PSA every other year.  I will see him back in 2 years for his next PSA.      Bhavin Celeste M.D.

## 2020-02-13 ENCOUNTER — OFFICE VISIT (OUTPATIENT)
Dept: INTERNAL MEDICINE | Facility: CLINIC | Age: 85
End: 2020-02-13
Payer: COMMERCIAL

## 2020-02-13 VITALS
DIASTOLIC BLOOD PRESSURE: 74 MMHG | SYSTOLIC BLOOD PRESSURE: 140 MMHG | WEIGHT: 219.8 LBS | OXYGEN SATURATION: 98 % | HEIGHT: 69 IN | BODY MASS INDEX: 32.56 KG/M2 | RESPIRATION RATE: 16 BRPM | HEART RATE: 60 BPM | TEMPERATURE: 98 F

## 2020-02-13 DIAGNOSIS — Z00.00 ENCOUNTER FOR MEDICARE ANNUAL WELLNESS EXAM: Primary | ICD-10-CM

## 2020-02-13 DIAGNOSIS — C61 PROSTATE CANCER (H): ICD-10-CM

## 2020-02-13 DIAGNOSIS — E78.5 HYPERLIPIDEMIA LDL GOAL <130: ICD-10-CM

## 2020-02-13 DIAGNOSIS — I10 ESSENTIAL HYPERTENSION, BENIGN: ICD-10-CM

## 2020-02-13 DIAGNOSIS — M05.9 RHEUMATOID ARTHRITIS WITH POSITIVE RHEUMATOID FACTOR, INVOLVING UNSPECIFIED SITE (H): ICD-10-CM

## 2020-02-13 DIAGNOSIS — R60.9 EDEMA, UNSPECIFIED TYPE: ICD-10-CM

## 2020-02-13 PROCEDURE — 99214 OFFICE O/P EST MOD 30 MIN: CPT | Mod: 25 | Performed by: INTERNAL MEDICINE

## 2020-02-13 PROCEDURE — 99397 PER PM REEVAL EST PAT 65+ YR: CPT | Performed by: INTERNAL MEDICINE

## 2020-02-13 RX ORDER — AMLODIPINE BESYLATE 2.5 MG/1
TABLET ORAL
Qty: 180 TABLET | Refills: 1 | Status: SHIPPED | OUTPATIENT
Start: 2020-02-13 | End: 2020-06-23

## 2020-02-13 RX ORDER — SIMVASTATIN 20 MG
20 TABLET ORAL AT BEDTIME
Qty: 90 TABLET | Refills: 1 | Status: SHIPPED | OUTPATIENT
Start: 2020-02-13 | End: 2020-06-23

## 2020-02-13 RX ORDER — TRIAMTERENE/HYDROCHLOROTHIAZID 37.5-25 MG
TABLET ORAL
Qty: 90 TABLET | Refills: 1 | Status: SHIPPED | OUTPATIENT
Start: 2020-02-13 | End: 2020-06-23

## 2020-02-13 SDOH — SOCIAL STABILITY: SOCIAL NETWORK: ARE YOU MARRIED, WIDOWED, DIVORCED, SEPARATED, NEVER MARRIED, OR LIVING WITH A PARTNER?: MARRIED

## 2020-02-13 SDOH — ECONOMIC STABILITY: TRANSPORTATION INSECURITY
IN THE PAST 12 MONTHS, HAS THE LACK OF TRANSPORTATION KEPT YOU FROM MEDICAL APPOINTMENTS OR FROM GETTING MEDICATIONS?: NO

## 2020-02-13 SDOH — SOCIAL STABILITY: SOCIAL NETWORK: HOW OFTEN DO YOU GET TOGETHER WITH FRIENDS OR RELATIVES?: THREE TIMES A WEEK

## 2020-02-13 SDOH — HEALTH STABILITY: MENTAL HEALTH: HOW OFTEN DO YOU HAVE A DRINK CONTAINING ALCOHOL?: 2-4 TIMES A MONTH

## 2020-02-13 SDOH — SOCIAL STABILITY: SOCIAL INSECURITY: WITHIN THE LAST YEAR, HAVE YOU BEEN HUMILIATED OR EMOTIONALLY ABUSED IN OTHER WAYS BY YOUR PARTNER OR EX-PARTNER?: NO

## 2020-02-13 SDOH — HEALTH STABILITY: MENTAL HEALTH: HOW MANY STANDARD DRINKS CONTAINING ALCOHOL DO YOU HAVE ON A TYPICAL DAY?: 1 OR 2

## 2020-02-13 SDOH — SOCIAL STABILITY: SOCIAL INSECURITY
WITHIN THE LAST YEAR, HAVE TO BEEN RAPED OR FORCED TO HAVE ANY KIND OF SEXUAL ACTIVITY BY YOUR PARTNER OR EX-PARTNER?: NO

## 2020-02-13 SDOH — SOCIAL STABILITY: SOCIAL INSECURITY
WITHIN THE LAST YEAR, HAVE YOU BEEN KICKED, HIT, SLAPPED, OR OTHERWISE PHYSICALLY HURT BY YOUR PARTNER OR EX-PARTNER?: NO

## 2020-02-13 SDOH — SOCIAL STABILITY: SOCIAL NETWORK: HOW OFTEN DO YOU ATTENT MEETINGS OF THE CLUB OR ORGANIZATION YOU BELONG TO?: NEVER

## 2020-02-13 SDOH — HEALTH STABILITY: MENTAL HEALTH
STRESS IS WHEN SOMEONE FEELS TENSE, NERVOUS, ANXIOUS, OR CAN'T SLEEP AT NIGHT BECAUSE THEIR MIND IS TROUBLED. HOW STRESSED ARE YOU?: NOT AT ALL

## 2020-02-13 SDOH — HEALTH STABILITY: PHYSICAL HEALTH: ON AVERAGE, HOW MANY MINUTES DO YOU ENGAGE IN EXERCISE AT THIS LEVEL?: 50 MIN

## 2020-02-13 SDOH — ECONOMIC STABILITY: INCOME INSECURITY: HOW HARD IS IT FOR YOU TO PAY FOR THE VERY BASICS LIKE FOOD, HOUSING, MEDICAL CARE, AND HEATING?: NOT HARD AT ALL

## 2020-02-13 SDOH — ECONOMIC STABILITY: FOOD INSECURITY: WITHIN THE PAST 12 MONTHS, THE FOOD YOU BOUGHT JUST DIDN'T LAST AND YOU DIDN'T HAVE MONEY TO GET MORE.: NEVER TRUE

## 2020-02-13 SDOH — SOCIAL STABILITY: SOCIAL NETWORK: HOW OFTEN DO YOU ATTEND CHURCH OR RELIGIOUS SERVICES?: MORE THAN 4 TIMES PER YEAR

## 2020-02-13 SDOH — SOCIAL STABILITY: SOCIAL NETWORK: IN A TYPICAL WEEK, HOW MANY TIMES DO YOU TALK ON THE PHONE WITH FAMILY, FRIENDS, OR NEIGHBORS?: ONCE A WEEK

## 2020-02-13 SDOH — HEALTH STABILITY: MENTAL HEALTH: HOW OFTEN DO YOU HAVE 6 OR MORE DRINKS ON ONE OCCASION?: NEVER

## 2020-02-13 SDOH — HEALTH STABILITY: PHYSICAL HEALTH: ON AVERAGE, HOW MANY DAYS PER WEEK DO YOU ENGAGE IN MODERATE TO STRENUOUS EXERCISE (LIKE A BRISK WALK)?: 3 DAYS

## 2020-02-13 SDOH — SOCIAL STABILITY: SOCIAL INSECURITY: WITHIN THE LAST YEAR, HAVE YOU BEEN AFRAID OF YOUR PARTNER OR EX-PARTNER?: NO

## 2020-02-13 SDOH — ECONOMIC STABILITY: TRANSPORTATION INSECURITY
IN THE PAST 12 MONTHS, HAS LACK OF TRANSPORTATION KEPT YOU FROM MEETINGS, WORK, OR FROM GETTING THINGS NEEDED FOR DAILY LIVING?: NO

## 2020-02-13 SDOH — ECONOMIC STABILITY: FOOD INSECURITY: WITHIN THE PAST 12 MONTHS, YOU WORRIED THAT YOUR FOOD WOULD RUN OUT BEFORE YOU GOT MONEY TO BUY MORE.: NEVER TRUE

## 2020-02-13 SDOH — SOCIAL STABILITY: SOCIAL NETWORK
DO YOU BELONG TO ANY CLUBS OR ORGANIZATIONS SUCH AS CHURCH GROUPS UNIONS, FRATERNAL OR ATHLETIC GROUPS, OR SCHOOL GROUPS?: NO

## 2020-02-13 ASSESSMENT — ENCOUNTER SYMPTOMS
CHILLS: 0
DIZZINESS: 0
HEADACHES: 1
SHORTNESS OF BREATH: 0
HEARTBURN: 0
PARESTHESIAS: 0
MYALGIAS: 0
EYE PAIN: 0
HEMATURIA: 0
CONSTIPATION: 0
FREQUENCY: 1
ARTHRALGIAS: 0
COUGH: 0
DIARRHEA: 0
DYSURIA: 0
SORE THROAT: 0
PALPITATIONS: 0
NERVOUS/ANXIOUS: 0
FEVER: 0
NAUSEA: 0
COUGH: 1
HEMATOCHEZIA: 0
ABDOMINAL PAIN: 0
WEAKNESS: 0
JOINT SWELLING: 0

## 2020-02-13 ASSESSMENT — MIFFLIN-ST. JEOR: SCORE: 1687.39

## 2020-02-13 ASSESSMENT — ACTIVITIES OF DAILY LIVING (ADL): CURRENT_FUNCTION: NO ASSISTANCE NEEDED

## 2020-02-13 NOTE — NURSING NOTE
"BP (!) 140/74 (BP Location: Right arm, Patient Position: Sitting, Cuff Size: Adult Large)   Pulse 60   Temp 98  F (36.7  C) (Oral)   Resp 16   Ht 1.753 m (5' 9\")   Wt 99.7 kg (219 lb 12.8 oz)   SpO2 98%   BMI 32.46 kg/m    Patient is being seen for a physical.  "

## 2020-02-13 NOTE — PATIENT INSTRUCTIONS
Patient Education       Everything looks fine!    Blood pressure elevated today, but I won't change your medications until/unless we know that it is consistently running high.     Refills of needed medications have been faxed to your pharmacy.     We can wait on any lab tests today.     See me in about six months. We can recheck your blood pressure and update lab tests at that time.   sooner if problems.

## 2020-02-13 NOTE — PROGRESS NOTES
"SUBJECTIVE:   Raul Younger is a 84 year old male who presents for Preventive Visit.  Patient is being seen for a physical.    Are you in the first 12 months of your Medicare coverage?  No    Healthy Habits:     In general, how would you rate your overall health?  Good    Frequency of exercise:  2-3 days/week    Duration of exercise:  30-45 minutes    Do you usually eat at least 4 servings of fruit and vegetables a day, include whole grains    & fiber and avoid regularly eating high fat or \"junk\" foods?  No    Taking medications regularly:  Yes    Medication side effects:  Lightheadedness    Ability to successfully perform activities of daily living:  No assistance needed    Home Safety:  No safety concerns identified    Hearing Impairment:  Difficulty following a conversation in a noisy restaurant or crowded room and find that men's voices are easier to understand than woman's    In the past 6 months, have you been bothered by leaking of urine? Yes    In general, how would you rate your overall mental or emotional health?  Excellent      PHQ-2 Total Score: 0    Additional concerns today:  No    Do you feel safe in your environment? Yes    Have you ever done Advance Care Planning? (For example, a Health Directive, POLST, or a discussion with a medical provider or your loved ones about your wishes): No, advance care planning information given to patient to review.  Patient declined advance care planning discussion at this time.    Fall risk  Fallen 2 or more times in the past year?: No  Any fall with injury in the past year?: No    Cognitive Screening   1) Repeat 3 items (Leader, Season, Table)    2) Clock draw: NORMAL  3) 3 item recall: Recalls 1 object   Results: NORMAL clock, 1-2 items recalled: COGNITIVE IMPAIRMENT LESS LIKELY    Mini-CogTM Copyright JACQUELYN Perera. Licensed by the author for use in [employer]; reprinted with permission (sarah beth@.[facility]). All rights reserved.      Do you have sleep " apnea, excessive snoring or daytime drowsiness?: yes     In addition to a preventive exam, we addressed hyperlipidemia, hypertension.     Hyperlipidemia    Recent Labs   Lab Test 19  1319 18  1055  02/19/15  0756 14  1036   CHOL 140 146   < > 165 142   HDL 56 50   < > 58 38*   LDL 58 68   < > 67 65   TRIG 130 139   < > 202* 190*   CHOLHDLRATIO  --   --   --  2.8 3.7    < > = values in this interval not displayed.     LDL-Cholesterol Well controlled with 20 mg one tablet Simvastatin daily.    Hypertension    BP Readings from Last 3 Encounters:   20 (!) 140/74   19 122/66   19 130/73     Pt takes 25 mg one tablet Maxzide daily as well as 2.5 mg two tablets Amlodipine daily.    Abrasions    Pt has abrasions that look like rug-burn on right arm. Pt stated that his wife was concerned and that neither of them recall any reason as to abrasions occurring.     Past/recent records reviewed and discussed for:  -Reviewed and updated medical hx, medications, social hx, family hx, and immunizations  -Recently moved to assisted living    Reviewed and updated as needed this visit by clinical staff  Tobacco  Allergies  Meds  Med Hx  Surg Hx  Fam Hx  Soc Hx      Reviewed and updated as needed this visit by Provider  Tobacco  Soc Hx       Social History     Tobacco Use     Smoking status: Former Smoker     Packs/day: 1.00     Years: 10.00     Pack years: 10.00     Types: Cigarettes, Pipe     Last attempt to quit: 1972     Years since quittin.1     Smokeless tobacco: Never Used   Substance Use Topics     Alcohol use: Yes     Frequency: 2-4 times a month     Drinks per session: 1 or 2     Binge frequency: Never     Comment: Infrequent wine/beer     Alcohol Use 2020   Prescreen: >3 drinks/day or >7 drinks/week? No   Prescreen: >3 drinks/day or >7 drinks/week? -     Current providers sharing in care for this patient include:   Patient Care Team:  Jose D Vasquez MD as PCP - General  (Internal Medicine)  Jose D Vasquez MD as Assigned PCP    The following health maintenance items are reviewed in Epic and correct as of today:  Health Maintenance   Topic Date Due     MEDICARE ANNUAL WELLNESS VISIT  06/08/2019     FALL RISK ASSESSMENT  02/13/2021     ADVANCE CARE PLANNING  02/13/2025     DTAP/TDAP/TD IMMUNIZATION (4 - Td) 10/09/2028     PHQ-2  Completed     INFLUENZA VACCINE  Completed     PNEUMOCOCCAL IMMUNIZATION 65+ LOW/MEDIUM RISK  Completed     ZOSTER IMMUNIZATION  Completed     IPV IMMUNIZATION  Aged Out     MENINGITIS IMMUNIZATION  Aged Out     Review of Systems   Constitutional: Negative for chills and fever.   HENT: Negative for congestion, ear pain, hearing loss and sore throat.    Eyes: Positive for visual disturbance. Negative for pain.   Respiratory: Positive for cough (Pt reports coughing due to unknown factor, but denies colored phlegm production.). Negative for shortness of breath.    Cardiovascular: Negative for chest pain, palpitations and peripheral edema.   Gastrointestinal: Negative for abdominal pain, constipation, diarrhea, heartburn, hematochezia and nausea.   Genitourinary: Positive for frequency and impotence. Negative for discharge, dysuria, genital sores, hematuria and urgency.   Musculoskeletal: Negative for arthralgias, joint swelling and myalgias.   Skin: Negative for rash.   Neurological: Positive for headaches. Negative for dizziness, weakness and paresthesias.   Psychiatric/Behavioral: Negative for mood changes. The patient is not nervous/anxious.      No dyspnea or cough. No chest discomfort, dizziness or palpitations. No diarrhea, abdominal pain or rectal bleeding.   No acute problems with vision or speech, lateralizing weakness or paresthesias.    ROS: as above or negative for Respiratory, CV, GI, endocrine, neuro systems.    This document serves as a record of the services and decisions personally performed and made by Jose D Vasquez MD. It was created on his  "behalf by Julio Cesar Haney, a trained medical scribe. The creation of this document is based on the provider's statements to the medical scribe.  Julio Cesar Haney February 13, 2020 3:26 PM       OBJECTIVE:   BP (!) 140/74 (BP Location: Right arm, Patient Position: Sitting, Cuff Size: Adult Large)   Pulse 60   Temp 98  F (36.7  C) (Oral)   Resp 16   Ht 1.753 m (5' 9\")   Wt 99.7 kg (219 lb 12.8 oz)   SpO2 98%   BMI 32.46 kg/m   Estimated body mass index is 32.46 kg/m  as calculated from the following:    Height as of this encounter: 1.753 m (5' 9\").    Weight as of this encounter: 99.7 kg (219 lb 12.8 oz).     Physical Exam     GENERAL: healthy, alert and no distress  EYES: Eyes grossly normal to inspection, PERRL and conjunctivae and sclerae normal  HENT: ear canals and TM's normal, nose and mouth without ulcers or lesions  NECK: no adenopathy, no asymmetry, masses, or scars and thyroid normal to palpation  RESP: lungs clear to auscultation - no rales, rhonchi or wheezes  CV: regular rate and rhythm, normal S1 S2, no S3 or S4, no murmur, click or rub, no peripheral edema and peripheral pulses strong  ABDOMEN: soft, nontender, no hepatosplenomegaly, no masses and bowel sounds normal   (male): Not performed.  RECTAL: Not performed.  MS: no gross musculoskeletal defects noted, no edema  SKIN: small abraded and scabbed lesions on forearm/elbow, no suspicious lesions or rashes  NEURO: Normal strength and tone, mentation intact and speech normal  PSYCH: mentation appears normal, affect normal/bright    Diagnostic Test Results:  Labs reviewed in Epic  No results found for this or any previous visit (from the past 24 hour(s)).    ASSESSMENT / PLAN:     (Z00.00) Encounter for Medicare annual wellness exam  (primary encounter diagnosis)  Comment: Stable health. See epic orders.  Plan:     (C61) Prostate cancer (H)  Comment: Being managed by urology.  Plan:     (M05.9) Rheumatoid arthritis with positive rheumatoid factor, " "involving unspecified site (H)  Comment: Stable, continue current measures.  Plan:     (E78.5) Hyperlipidemia LDL goal <130  Comment: LDL at target. Continue current meds.  Plan: simvastatin (ZOCOR) 20 MG tablet, OFFICE/OUTPT         VISIT,EST,LEVL III, Comprehensive metabolic         panel (BMP + Alb, Alk Phos, ALT, AST, Total.         Bili, TP), Lipid panel reflex to direct LDL         Fasting    (I10) Essential hypertension, benign  Comment: BP at target. Continue current meds.  Plan: amLODIPine (NORVASC) 2.5 MG tablet,         triamterene-HCTZ (MAXZIDE-25) 37.5-25 MG         tablet, OFFICE/OUTPT VISIT,EST,LEVL III          (R60.9) Edema, unspecified type  Comment:   Plan:     COUNSELING:  Reviewed preventive health counseling, as reflected in patient instructions       Regular exercise       Healthy diet/nutrition       Colon cancer screening       Prostate cancer screening    Estimated body mass index is 32.46 kg/m  as calculated from the following:    Height as of this encounter: 1.753 m (5' 9\").    Weight as of this encounter: 99.7 kg (219 lb 12.8 oz).    Weight management plan: Discussed healthy diet and exercise guidelines     reports that he quit smoking about 48 years ago. His smoking use included cigarettes and pipe. He has a 10.00 pack-year smoking history. He has never used smokeless tobacco.    Everything looks fine!    Blood pressure elevated today, but I won't change your medications until/unless we know that it is consistently running high.     Refills of needed medications have been faxed to your pharmacy.     We can wait on any lab tests today.     See me in about six months. We can recheck your blood pressure and update lab tests at that time.   sooner if problems.    Appropriate preventive services were discussed with this patient, including applicable screening as appropriate for cardiovascular disease, diabetes, osteopenia/osteoporosis, and glaucoma.  As appropriate for age/gender, discussed " screening for colorectal cancer, prostate cancer, breast cancer, and cervical cancer. Checklist reviewing preventive services available has been given to the patient.    Reviewed patients plan of care and provided an AVS. The Basic Care Plan (routine screening as documented in Health Maintenance) for Ralu meets the Care Plan requirement. This Care Plan has been established and reviewed with the Patient.    Counseling Resources:  ATP IV Guidelines  Pooled Cohorts Equation Calculator  Breast Cancer Risk Calculator  FRAX Risk Assessment  ICSI Preventive Guidelines  Dietary Guidelines for Americans, 2010  USDA's MyPlate  ASA Prophylaxis  Lung CA Screening    The information in this document, created by the medical scribe for me, accurately reflects the services I personally performed and the decisions made by me. I have reviewed and approved this document for accuracy prior to leaving the patient care area.  February 13, 2020 4:43 PM    Jose D Vasquez MD,   [facility]    Identified Health Risks:

## 2020-06-22 DIAGNOSIS — E78.5 HYPERLIPIDEMIA LDL GOAL <130: ICD-10-CM

## 2020-06-22 DIAGNOSIS — I10 ESSENTIAL HYPERTENSION, BENIGN: ICD-10-CM

## 2020-06-23 RX ORDER — TRIAMTERENE/HYDROCHLOROTHIAZID 37.5-25 MG
TABLET ORAL
Qty: 90 TABLET | Refills: 0 | Status: SHIPPED | OUTPATIENT
Start: 2020-06-23 | End: 2020-08-20

## 2020-06-23 NOTE — TELEPHONE ENCOUNTER
Maxzide: [place] [facility] [place]    Routing refill request to provider for review/approval because:  Drug interaction warning

## 2020-06-25 RX ORDER — AMLODIPINE BESYLATE 2.5 MG/1
TABLET ORAL
Qty: 180 TABLET | Refills: 0 | Status: SHIPPED | OUTPATIENT
Start: 2020-06-25 | End: 2020-08-20

## 2020-06-25 RX ORDER — SIMVASTATIN 20 MG
20 TABLET ORAL AT BEDTIME
Qty: 90 TABLET | Refills: 0 | Status: SHIPPED | OUTPATIENT
Start: 2020-06-25 | End: 2020-08-20

## 2020-08-05 NOTE — PROGRESS NOTES
"Raul Younger is a 84 year old male who is being evaluated via a billable telephone visit.      The patient has been notified of following:     \"This telephone visit will be conducted via a call between you and your physician/provider. We have found that certain health care needs can be provided without the need for a physical exam.  This service lets us provide the care you need with a short phone conversation.  If a prescription is necessary we can send it directly to your pharmacy.  If lab work is needed we can place an order for that and you can then stop by our lab to have the test done at a later time.    Telephone visits are billed at different rates depending on your insurance coverage. During this emergency period, for some insurers they may be billed the same as an in-person visit.  Please reach out to your insurance provider with any questions.    If during the course of the call the physician/provider feels a telephone visit is not appropriate, you will not be charged for this service.\"    Patient has given verbal consent for Telephone visit?  Yes    What phone number would you like to be contacted at? [phone]    How would you like to obtain your AVS? Mail a copy    Phone call duration: 8 minutes    Yoan Maria MD      Obstructive Sleep Apnea - PAP Follow-Up Visit:    Chief Complaint   Patient presents with     RECHECK     MIGUEL        Raul Younger comes in today for follow-up of their mild sleep apnea, managed with CPAP.      His sleep study from 2007 showed an apnea-hypopnea index of 11.4 per hour and RDI of 25.5 per hour.     Overall, he rates the experience with PAP as 10 (0 poor, 10 great). The mask is comfortable. The mask is not leaking.  He is not snoring with the mask on. He is not having gasp arousals.  He is not having significant oral/nasal dryness. The pressure settings are comfortable.     He uses nasal mask.     Bedtime is typically 10- 11 pm. Usually it takes about 10 " "minutes to fall asleep with the mask on. Wake time is typically 7-8 am.  Patient is using PAP therapy 9 hours per night. The patient is usually getting 9 hours of sleep per night.    He does feel rested in the morning.    Total score - [place]: 6 (8/5/2020 11:00 AM)    Respironics  CPAP 12 cmH2O download:  31/32 total days of use. 1 nonuse days.  Average use 9 hours 11 minutes per day.  97% days with >4 hours use.  Large leak 2 hrs per day average.  AHI 2.1.       Reviewed by team: Tobacco  Allergies  Meds       Reviewed by provider:        Problem List:  Patient Active Problem List    Diagnosis Date Noted     MIGUEL (obstructive sleep apnea) 08/01/2019     Priority: Medium     RA (rheumatoid arthritis) (H) 02/19/2015     Priority: Medium     2014. Treat with Pulse doses of Prednisone by Rheumatologist for \"exacerbations/flare-ups\"       History of colonic polyps 02/19/2015     Priority: Medium     Diagnosed 12/2011 due to for colonoscopy 2016  SPECIMEN(S):  Colon polyp ascending    FINAL DIAGNOSIS:  Ascending colon polyp, biopsy - Small fragment of mucosa without  diagnostic evidence of hyperplastic or adenomatous polyp. Negative for  dysplasia and malignancy.  Problem list name updated by automated process. Provider to review       Advanced directives, counseling/discussion 12/08/2011     Priority: Medium     Pt already has a HCD and will bring in a copy.     Shauna Mosquera MA         HYPERLIPIDEMIA LDL GOAL <130 10/31/2010     Priority: Medium     Prostate cancer (H) 01/26/2010     Priority: Medium     Cryosurgery  January 2011       Chronic rhinitis 06/02/2008     Priority: Medium     (Problem list name updated by automated process. Provider to review and confirm.)       Essential hypertension, benign 02/06/2003     Priority: Medium          There were no vitals taken for this visit.    Impression/Plan:     Mild sleep apnea.     -  Tolerating PAP well. Daytime symptoms are improved..     - I reviewed data from " his CPAP device which shows regular compliance and normal AHI. We reviewed leak that is detected in the download. Patient doesnot notice significant leak but will keep monitoring this./     Plan:    1. Continue auto PAP therapy     Raul AGRAWAL Carisa will follow up in about 1 year(s).     Eight minutes spent with patient, all of which were spent counseling, consulting, coordinating plan of care.      Yoan Maria MD    CC:  Jose D Vasquez,

## 2020-08-06 ENCOUNTER — VIRTUAL VISIT (OUTPATIENT)
Dept: SLEEP MEDICINE | Facility: CLINIC | Age: 85
End: 2020-08-06
Payer: COMMERCIAL

## 2020-08-06 DIAGNOSIS — G47.33 OSA (OBSTRUCTIVE SLEEP APNEA): Primary | ICD-10-CM

## 2020-08-06 PROCEDURE — 99213 OFFICE O/P EST LOW 20 MIN: CPT | Mod: 95 | Performed by: INTERNAL MEDICINE

## 2020-08-06 NOTE — PATIENT INSTRUCTIONS
Your There is no height or weight on file to calculate BMI.  Weight management is a personal decision.  If you are interested in exploring weight loss strategies, the following discussion covers the approaches that may be successful. Body mass index (BMI) is one way to tell whether you are at a healthy weight, overweight, or obese. It measures your weight in relation to your height.  A BMI of 18.5 to 24.9 is in the healthy range. A person with a BMI of 25 to 29.9 is considered overweight, and someone with a BMI of 30 or greater is considered obese. More than two-thirds of American adults are considered overweight or obese.  Being overweight or obese increases the risk for further weight gain. Excess weight may lead to heart disease and diabetes.  Creating and following plans for healthy eating and physical activity may help you improve your health.  Weight control is part of healthy lifestyle and includes exercise, emotional health, and healthy eating habits. Careful eating habits lifelong are the mainstay of weight control. Though there are significant health benefits from weight loss, long-term weight loss with diet alone may be very difficult to achieve- studies show long-term success with dietary management in less than 10% of people. Attaining a healthy weight may be especially difficult to achieve in those with severe obesity. In some cases, medications, devices and surgical management might be considered.  What can you do?  If you are overweight or obese and are interested in methods for weight loss, you should discuss this with your provider.     Consider reducing daily calorie intake by 500 calories.     Keep a food journal.     Avoiding skipping meals, consider cutting portions instead.    Diet combined with exercise helps maintain muscle while optimizing fat loss. Strength training is particularly important for building and maintaining muscle mass. Exercise helps reduce stress, increase energy, and  improves fitness. Increasing exercise without diet control, however, may not burn enough calories to loose weight.       Start walking three days a week 10-20 minutes at a time    Work towards walking thirty minutes five days a week     Eventually, increase the speed of your walking for 1-2 minutes at time    In addition, we recommend that you review healthy lifestyles and methods for weight loss available through the National Institutes of Health patient information sites:  http://win.niddk.nih.gov/publications/index.htm    And look into health and wellness programs that may be available through your health insurance provider, employer, local community center, or li club.

## 2020-08-13 ENCOUNTER — ALLIED HEALTH/NURSE VISIT (OUTPATIENT)
Dept: NURSING | Facility: CLINIC | Age: 85
End: 2020-08-13
Payer: COMMERCIAL

## 2020-08-13 VITALS — DIASTOLIC BLOOD PRESSURE: 74 MMHG | SYSTOLIC BLOOD PRESSURE: 132 MMHG

## 2020-08-13 DIAGNOSIS — I10 ESSENTIAL HYPERTENSION, BENIGN: Primary | ICD-10-CM

## 2020-08-17 DIAGNOSIS — E78.5 HYPERLIPIDEMIA LDL GOAL <130: ICD-10-CM

## 2020-08-17 PROCEDURE — 80053 COMPREHEN METABOLIC PANEL: CPT | Performed by: INTERNAL MEDICINE

## 2020-08-17 PROCEDURE — 80061 LIPID PANEL: CPT | Performed by: INTERNAL MEDICINE

## 2020-08-17 PROCEDURE — 36415 COLL VENOUS BLD VENIPUNCTURE: CPT | Performed by: INTERNAL MEDICINE

## 2020-08-18 LAB
ALBUMIN SERPL-MCNC: 3.6 G/DL (ref 3.4–5)
ALP SERPL-CCNC: 89 U/L (ref 40–150)
ALT SERPL W P-5'-P-CCNC: 24 U/L (ref 0–70)
ANION GAP SERPL CALCULATED.3IONS-SCNC: 7 MMOL/L (ref 3–14)
AST SERPL W P-5'-P-CCNC: 17 U/L (ref 0–45)
BILIRUB SERPL-MCNC: 0.5 MG/DL (ref 0.2–1.3)
BUN SERPL-MCNC: 20 MG/DL (ref 7–30)
CALCIUM SERPL-MCNC: 9.3 MG/DL (ref 8.5–10.1)
CHLORIDE SERPL-SCNC: 110 MMOL/L (ref 94–109)
CHOLEST SERPL-MCNC: 162 MG/DL
CO2 SERPL-SCNC: 25 MMOL/L (ref 20–32)
CREAT SERPL-MCNC: 1.12 MG/DL (ref 0.66–1.25)
GFR SERPL CREATININE-BSD FRML MDRD: 61 ML/MIN/{1.73_M2}
GLUCOSE SERPL-MCNC: 88 MG/DL (ref 70–99)
HDLC SERPL-MCNC: 54 MG/DL
LDLC SERPL CALC-MCNC: 81 MG/DL
NONHDLC SERPL-MCNC: 108 MG/DL
POTASSIUM SERPL-SCNC: 4.1 MMOL/L (ref 3.4–5.3)
PROT SERPL-MCNC: 6.9 G/DL (ref 6.8–8.8)
SODIUM SERPL-SCNC: 142 MMOL/L (ref 133–144)
TRIGL SERPL-MCNC: 137 MG/DL

## 2020-08-20 ENCOUNTER — VIRTUAL VISIT (OUTPATIENT)
Dept: INTERNAL MEDICINE | Facility: CLINIC | Age: 85
End: 2020-08-20
Payer: COMMERCIAL

## 2020-08-20 DIAGNOSIS — E78.5 HYPERLIPIDEMIA LDL GOAL <130: ICD-10-CM

## 2020-08-20 DIAGNOSIS — I10 ESSENTIAL HYPERTENSION, BENIGN: ICD-10-CM

## 2020-08-20 PROCEDURE — 99213 OFFICE O/P EST LOW 20 MIN: CPT | Mod: 95 | Performed by: INTERNAL MEDICINE

## 2020-08-20 RX ORDER — TRIAMTERENE/HYDROCHLOROTHIAZID 37.5-25 MG
TABLET ORAL
Qty: 90 TABLET | Refills: 1 | Status: SHIPPED | OUTPATIENT
Start: 2020-08-20 | End: 2021-01-21

## 2020-08-20 RX ORDER — AMLODIPINE BESYLATE 2.5 MG/1
TABLET ORAL
Qty: 180 TABLET | Refills: 1 | Status: SHIPPED | OUTPATIENT
Start: 2020-08-20 | End: 2021-01-23

## 2020-08-20 RX ORDER — SIMVASTATIN 20 MG
20 TABLET ORAL AT BEDTIME
Qty: 90 TABLET | Refills: 1 | Status: SHIPPED | OUTPATIENT
Start: 2020-08-20 | End: 2021-01-23

## 2020-08-20 NOTE — PROGRESS NOTES
"1524---1538    Telephone visit: 8 minute conversation.    Raul Younger is a 84 year old male who is being evaluated via a billable telephone visit.      The patient has been notified of following:     \"This telephone visit will be conducted via a call between you and your physician/provider. We have found that certain health care needs can be provided without the need for a physical exam.  This service lets us provide the care you need with a short phone conversation.  If a prescription is necessary we can send it directly to your pharmacy.  If lab work is needed we can place an order for that and you can then stop by our lab to have the test done at a later time.    Telephone visits are billed at different rates depending on your insurance coverage. During this emergency period, for some insurers they may be billed the same as an in-person visit.  Please reach out to your insurance provider with any questions.    If during the course of the call the physician/provider feels a telephone visit is not appropriate, you will not be charged for this service.\"    Patient has given verbal consent for Telephone visit?  Yes    What phone number would you like to be contacted at? [phone]    How would you like to obtain your AVS? Mail a copy    Subjective     Raul Younger is a 84 year old male who presents via phone visit today for the following health issues:  Patient is being seen for an blood pressure check and go over labs.    HPI    Hyperlipidemia Follow-Up      Are you regularly taking any medication or supplement to lower your cholesterol?   Yes- Simvastatin    Are you having muscle aches or other side effects that you think could be caused by your cholesterol lowering medication?  No    Hypertension Follow-up      Do you check your blood pressure regularly outside of the clinic? No     Are you following a low salt diet? No    Are your blood pressures ever more than 140 on the top number (systolic) OR " more   than 90 on the bottom number (diastolic), for example 140/90? No      How many servings of fruits and vegetables do you eat daily?  2-3    On average, how many sweetened beverages do you drink each day (Examples: soda, juice, sweet tea, etc.  Do NOT count diet or artificially sweetened beverages)?   1    How many days per week do you exercise enough to make your heart beat faster? 3 or less    How many minutes a day do you exercise enough to make your heart beat faster? 9 or less    How many days per week do you miss taking your medication? 0    The patient has been presenting to the rheumatology at [facility] in [place] every 7 weeks for injections and lab tests for rheumatoid arthritis.  He reports that his blood pressure is checked there routinely and has been normal.  Blood pressure there today was reportedly 123 over 60s diastolic.    We reviewed recent lab tests which shows a favorable LDL cholesterol.    The patient is tolerating his current medications without any adverse effects.      Past medical, family and social histories as well as medications reviewed and updated as needed.    Review of Systems   REVIEW OF SYSTEMS: The following systems have been completely reviewed and are negative except as noted above:   Constitutional, respiratory, cardiovascular, musculoskeletal, dermatologic systems.         Objective          Vitals:  No vitals were obtained today due to virtual visit.      PSYCH: Alert and oriented times 3; coherent speech, normal   rate and volume, able to articulate logical thoughts.  RESP: No cough, no audible wheezing, able to talk in full sentences  Remainder of exam unable to be completed due to telephone visits     Assessment & Plan   (I10) Essential hypertension, benign  Comment: BP reportedly well controlled when taken at Rheumatology visits. Continue current meds.   Plan: amLODIPine (NORVASC) 2.5 MG tablet,         triamterene-HCTZ (MAXZIDE-25) 37.5-25 MG  "tablet          (E78.5) Hyperlipidemia LDL goal <130  Comment: Recent LDL at target. Continue current meds.   Plan: simvastatin (ZOCOR) 20 MG tablet           BMI:   Estimated body mass index is 32.46 kg/m  as calculated from the following:    Height as of 2/13/20: 1.753 m (5' 9\").    Weight as of 2/13/20: 99.7 kg (219 lb 12.8 oz).   Weight management plan: Discussed healthy diet and exercise guidelines      Follow-up with me in six months for an annual wellness exam, call or schedule a follow-up appointment sooner if any problems.     Jose D Vasquez MD,   [facility]    Phone call duration:  8 minutes                  "

## 2021-07-20 ENCOUNTER — TRANSFERRED RECORDS (OUTPATIENT)
Dept: HEALTH INFORMATION MANAGEMENT | Facility: CLINIC | Age: 86
End: 2021-07-20

## 2021-08-12 ENCOUNTER — OFFICE VISIT (OUTPATIENT)
Dept: INTERNAL MEDICINE | Facility: CLINIC | Age: 86
End: 2021-08-12
Payer: COMMERCIAL

## 2021-08-12 VITALS
BODY MASS INDEX: 36.73 KG/M2 | DIASTOLIC BLOOD PRESSURE: 70 MMHG | RESPIRATION RATE: 16 BRPM | TEMPERATURE: 98.3 F | HEIGHT: 67 IN | WEIGHT: 234 LBS | HEART RATE: 90 BPM | OXYGEN SATURATION: 95 % | SYSTOLIC BLOOD PRESSURE: 132 MMHG

## 2021-08-12 DIAGNOSIS — Z00.00 ENCOUNTER FOR MEDICARE ANNUAL WELLNESS EXAM: Primary | ICD-10-CM

## 2021-08-12 DIAGNOSIS — E66.01 MORBID OBESITY (H): ICD-10-CM

## 2021-08-12 DIAGNOSIS — C61 PROSTATE CANCER (H): ICD-10-CM

## 2021-08-12 DIAGNOSIS — E78.5 HYPERLIPIDEMIA LDL GOAL <130: ICD-10-CM

## 2021-08-12 DIAGNOSIS — G47.33 OSA (OBSTRUCTIVE SLEEP APNEA): ICD-10-CM

## 2021-08-12 DIAGNOSIS — M05.9 RHEUMATOID ARTHRITIS WITH POSITIVE RHEUMATOID FACTOR, INVOLVING UNSPECIFIED SITE (H): ICD-10-CM

## 2021-08-12 DIAGNOSIS — I10 ESSENTIAL HYPERTENSION, BENIGN: ICD-10-CM

## 2021-08-12 DIAGNOSIS — Z79.899 MEDICATION MANAGEMENT: ICD-10-CM

## 2021-08-12 PROCEDURE — 84443 ASSAY THYROID STIM HORMONE: CPT | Performed by: INTERNAL MEDICINE

## 2021-08-12 PROCEDURE — 80061 LIPID PANEL: CPT | Performed by: INTERNAL MEDICINE

## 2021-08-12 PROCEDURE — 80053 COMPREHEN METABOLIC PANEL: CPT | Performed by: INTERNAL MEDICINE

## 2021-08-12 PROCEDURE — 36415 COLL VENOUS BLD VENIPUNCTURE: CPT | Performed by: INTERNAL MEDICINE

## 2021-08-12 PROCEDURE — 99397 PER PM REEVAL EST PAT 65+ YR: CPT | Performed by: INTERNAL MEDICINE

## 2021-08-12 PROCEDURE — 99214 OFFICE O/P EST MOD 30 MIN: CPT | Mod: 25 | Performed by: INTERNAL MEDICINE

## 2021-08-12 RX ORDER — AMLODIPINE BESYLATE 2.5 MG/1
TABLET ORAL
Qty: 180 TABLET | Refills: 3 | Status: SHIPPED | OUTPATIENT
Start: 2021-08-12 | End: 2022-05-18

## 2021-08-12 RX ORDER — SIMVASTATIN 20 MG
20 TABLET ORAL AT BEDTIME
Qty: 90 TABLET | Refills: 3 | Status: SHIPPED | OUTPATIENT
Start: 2021-08-12 | End: 2022-05-18

## 2021-08-12 RX ORDER — TRIAMTERENE/HYDROCHLOROTHIAZID 37.5-25 MG
TABLET ORAL
Qty: 90 TABLET | Refills: 3 | Status: SHIPPED | OUTPATIENT
Start: 2021-08-12 | End: 2022-05-18

## 2021-08-12 SDOH — HEALTH STABILITY: PHYSICAL HEALTH: ON AVERAGE, HOW MANY MINUTES DO YOU ENGAGE IN EXERCISE AT THIS LEVEL?: 50 MIN

## 2021-08-12 SDOH — ECONOMIC STABILITY: FOOD INSECURITY: WITHIN THE PAST 12 MONTHS, THE FOOD YOU BOUGHT JUST DIDN'T LAST AND YOU DIDN'T HAVE MONEY TO GET MORE.: NEVER TRUE

## 2021-08-12 SDOH — ECONOMIC STABILITY: FOOD INSECURITY: WITHIN THE PAST 12 MONTHS, YOU WORRIED THAT YOUR FOOD WOULD RUN OUT BEFORE YOU GOT MONEY TO BUY MORE.: NEVER TRUE

## 2021-08-12 SDOH — ECONOMIC STABILITY: INCOME INSECURITY: IN THE LAST 12 MONTHS, WAS THERE A TIME WHEN YOU WERE NOT ABLE TO PAY THE MORTGAGE OR RENT ON TIME?: NO

## 2021-08-12 SDOH — HEALTH STABILITY: PHYSICAL HEALTH: ON AVERAGE, HOW MANY DAYS PER WEEK DO YOU ENGAGE IN MODERATE TO STRENUOUS EXERCISE (LIKE A BRISK WALK)?: 3 DAYS

## 2021-08-12 ASSESSMENT — LIFESTYLE VARIABLES
HOW MANY STANDARD DRINKS CONTAINING ALCOHOL DO YOU HAVE ON A TYPICAL DAY: 1 OR 2
HOW OFTEN DO YOU HAVE SIX OR MORE DRINKS ON ONE OCCASION: NEVER
HOW OFTEN DO YOU HAVE A DRINK CONTAINING ALCOHOL: 2-4 TIMES A MONTH
HOW OFTEN DO YOU HAVE A DRINK CONTAINING ALCOHOL: 2-4 TIMES A MONTH
HOW MANY STANDARD DRINKS CONTAINING ALCOHOL DO YOU HAVE ON A TYPICAL DAY: 1 OR 2
HOW OFTEN DO YOU HAVE SIX OR MORE DRINKS ON ONE OCCASION: NEVER

## 2021-08-12 ASSESSMENT — SOCIAL DETERMINANTS OF HEALTH (SDOH)
WITHIN THE LAST YEAR, HAVE TO BEEN RAPED OR FORCED TO HAVE ANY KIND OF SEXUAL ACTIVITY BY YOUR PARTNER OR EX-PARTNER?: NO
WITHIN THE LAST YEAR, HAVE YOU BEEN AFRAID OF YOUR PARTNER OR EX-PARTNER?: NO
WITHIN THE LAST YEAR, HAVE YOU BEEN KICKED, HIT, SLAPPED, OR OTHERWISE PHYSICALLY HURT BY YOUR PARTNER OR EX-PARTNER?: NO
WITHIN THE LAST YEAR, HAVE YOU BEEN HUMILIATED OR EMOTIONALLY ABUSED IN OTHER WAYS BY YOUR PARTNER OR EX-PARTNER?: NO
HOW HARD IS IT FOR YOU TO PAY FOR THE VERY BASICS LIKE FOOD, HOUSING, MEDICAL CARE, AND HEATING?: NOT HARD AT ALL

## 2021-08-12 ASSESSMENT — ENCOUNTER SYMPTOMS
CONSTIPATION: 0
DIZZINESS: 0
DYSURIA: 0
SORE THROAT: 0
HEADACHES: 0
ABDOMINAL PAIN: 0
NAUSEA: 0
CHILLS: 0
JOINT SWELLING: 0
ARTHRALGIAS: 0
COUGH: 0
EYE PAIN: 0
HEMATURIA: 0
FREQUENCY: 1
NERVOUS/ANXIOUS: 0
PARESTHESIAS: 0
MYALGIAS: 0
FEVER: 0
SHORTNESS OF BREATH: 0
PALPITATIONS: 0
HEMATOCHEZIA: 0
WEAKNESS: 0
DIARRHEA: 0
HEARTBURN: 0

## 2021-08-12 ASSESSMENT — MIFFLIN-ST. JEOR: SCORE: 1720.17

## 2021-08-12 ASSESSMENT — ACTIVITIES OF DAILY LIVING (ADL): CURRENT_FUNCTION: NO ASSISTANCE NEEDED

## 2021-08-12 NOTE — PATIENT INSTRUCTIONS
Everything looks fine!    Refills of medications have been faxed to your pharmacy.     Lab results will be available soon on [facility].    See me in a year, sooner if problems.

## 2021-08-12 NOTE — PROGRESS NOTES
"SUBJECTIVE:   Raul Younger is a 85 year old male who presents for Preventive Visit.      Patient has been advised of split billing requirements and indicates understanding: Yes   Are you in the first 12 months of your Medicare coverage?  No    Healthy Habits:     In general, how would you rate your overall health?  Excellent    Frequency of exercise:  2-3 days/week    Duration of exercise:  30-45 minutes    Do you usually eat at least 4 servings of fruit and vegetables a day, include whole grains    & fiber and avoid regularly eating high fat or \"junk\" foods?  No    Taking medications regularly:  Yes    Medication side effects:  Not applicable and None    Ability to successfully perform activities of daily living:  No assistance needed    Home Safety:  No safety concerns identified    Hearing Impairment:  Difficulty following a conversation in a noisy restaurant or crowded room, feel that people are mumbling or not speaking clearly and find that men's voices are easier to understand than woman's    In the past 6 months, have you been bothered by leaking of urine? Yes    In general, how would you rate your overall mental or emotional health?  Excellent      PHQ-2 Total Score: 0    Additional concerns today:  No             Do you feel safe in your environment? Yes    Have you ever done Advance Care Planning? (For example, a Health Directive, POLST, or a discussion with a medical provider or your loved ones about your wishes): Yes, patient states has an Advance Care Planning document and will bring a copy to the clinic.       Fall risk  Fallen 2 or more times in the past year?: No  Any fall with injury in the past year?: No    Cognitive Screening   1) Repeat 3 items (Leader, Season, Table)    2) Clock draw: NORMAL  3) 3 item recall: Recalls 1 object   Results: NORMAL clock, 1-2 items recalled: COGNITIVE IMPAIRMENT LESS LIKELY    Mini-CogTM Copyright S Trever. Licensed by the author for use in [facility] " Services; reprinted with permission (soayesha@[facility]). All rights reserved.      Do you have sleep apnea, excessive snoring or daytime drowsiness?: yes    Reviewed and updated as needed this visit by clinical staff  Tobacco  Allergies  Meds   Med Hx  Surg Hx  Fam Hx  Soc Hx        Reviewed and updated as needed this visit by Provider                Social History     Tobacco Use     Smoking status: Former Smoker     Packs/day: 1.00     Years: 10.00     Pack years: 10.00     Types: Cigarettes, Pipe     Quit date: 1972     Years since quittin.6     Smokeless tobacco: Never Used   Substance Use Topics     Alcohol use: Yes     Comment: Infrequent wine/beer     If you drink alcohol do you typically have >3 drinks per day or >7 drinks per week? No    Alcohol Use 2021   Prescreen: >3 drinks/day or >7 drinks/week? No   Prescreen: >3 drinks/day or >7 drinks/week? -       PROBLEMS TO ADD ON...In addition to an Annual Wellness Exam, we addressed hypertension, hyperlipidemia.     The patient is tolerating his current medications without any adverse effects.  BP appears satisfactorily controlled on current meds.      We agreed to update needed labs, including lipids. He continues on simvastatin.       Current providers sharing in care for this patient include:   Patient Care Team:  Jose D Vasquez MD as PCP - General (Internal Medicine)  Jose D Vasquez MD as Assigned PCP  Yoan Maria MD as Assigned Sleep Provider    The following health maintenance items are reviewed in Epic and correct as of today:  Health Maintenance Due   Topic Date Due     ANNUAL REVIEW OF HM ORDERS  Never done     MEDICARE ANNUAL WELLNESS VISIT  2021     FALL RISK ASSESSMENT  2021     Pneumonia Vaccine: Patient has received both pneumonia vaccinations.      Review of Systems   Constitutional: Negative for chills and fever.   HENT: Negative for congestion, ear pain, hearing loss and sore throat.    Eyes: Positive for visual  "disturbance. Negative for pain.   Respiratory: Negative for cough and shortness of breath.    Cardiovascular: Negative for chest pain, palpitations and peripheral edema.   Gastrointestinal: Negative for abdominal pain, constipation, diarrhea, heartburn, hematochezia and nausea.   Genitourinary: Positive for frequency and urgency. Negative for discharge, dysuria, genital sores, hematuria and impotence.   Musculoskeletal: Negative for arthralgias, joint swelling and myalgias.   Skin: Negative for rash.   Neurological: Negative for dizziness, weakness, headaches and paresthesias.   Psychiatric/Behavioral: Negative for mood changes. The patient is not nervous/anxious.        OBJECTIVE:   /70   Pulse 90   Temp 98.3  F (36.8  C) (Oral)   Resp 16   Ht 1.71 m (5' 7.32\")   Wt 106.1 kg (234 lb)   SpO2 95%   BMI 36.30 kg/m   Estimated body mass index is 36.3 kg/m  as calculated from the following:    Height as of this encounter: 1.71 m (5' 7.32\").    Weight as of this encounter: 106.1 kg (234 lb).  Physical Exam  GENERAL: healthy, alert and no distress  EYES: Eyes grossly normal to inspection, PERRL and conjunctivae and sclerae normal  HENT: ear canals and TM's normal, nose and mouth without ulcers or lesions  NECK: no adenopathy, no asymmetry, masses, or scars and thyroid normal to palpation  RESP: lungs clear to auscultation - no rales, rhonchi or wheezes  CV: regular rate and rhythm, normal S1 S2, no S3 or S4, no murmur, click or rub, no peripheral edema and peripheral pulses strong  ABDOMEN: soft, nontender, no hepatosplenomegaly, no masses and bowel sounds normal  MS: no gross musculoskeletal defects noted, no edema  SKIN: no suspicious lesions or rashes  NEURO: Normal strength and tone, mentation intact and speech normal  PSYCH: mentation appears normal, affect normal/bright    Diagnostic Test Results:  Labs reviewed in Epic    ASSESSMENT / PLAN:   (Z00.00) Encounter for Medicare annual wellness exam  " "(primary encounter diagnosis)  Comment: Stable health. See epic orders.     (I10) Essential hypertension, benign  Comment: BP at target. Continue current meds.   Plan: OFFICE/OUTPT VISIT,EST,LEVL III, amLODIPine         (NORVASC) 2.5 MG tablet, triamterene-HCTZ         (MAXZIDE-25) 37.5-25 MG tablet          (E78.5) Hyperlipidemia LDL goal <130  Comment: Update lipids. Continue current meds.   Plan: **Comprehensive metabolic panel FUTURE 2mo,         Lipid panel reflex to direct LDL Fasting,         OFFICE/OUTPT VISIT,EST,LEVL III, simvastatin         (ZOCOR) 20 MG tablet          (M05.9) Rheumatoid arthritis with positive rheumatoid factor, involving unspecified site (H)  Comment: Continue chronic meds.     (E66.01) Obesity: BMI>35 with comorbidity  Comment: Discussed some ideas about reducing food portions. Comorbidities include hypertension, hyperlipidemia, obstructive sleep apnea.     (G47.33) MIGUEL (obstructive sleep apnea)  Comment: Continue CPAP    (C61) Prostate cancer (H)  Comment: No evidence of disease.     (Z79.899) Medication management  Plan: **TSH with free T4 reflex FUTURE 2mo            Patient has been advised of split billing requirements and indicates understanding: Yes  COUNSELING:  Reviewed preventive health counseling, as reflected in patient instructions    Estimated body mass index is 36.3 kg/m  as calculated from the following:    Height as of this encounter: 1.71 m (5' 7.32\").    Weight as of this encounter: 106.1 kg (234 lb).    Weight management plan: Discussed healthy diet and exercise guidelines    He reports that he quit smoking about 49 years ago. His smoking use included cigarettes and pipe. He has a 10.00 pack-year smoking history. He has never used smokeless tobacco.      Appropriate preventive services were discussed with this patient, including applicable screening as appropriate for cardiovascular disease, diabetes, osteopenia/osteoporosis, and glaucoma.  As appropriate for " age/gender, discussed screening for colorectal cancer, prostate cancer, breast cancer, and cervical cancer. Checklist reviewing preventive services available has been given to the patient.    Reviewed patients plan of care and provided an AVS. The Basic Care Plan (routine screening as documented in Health Maintenance) for Raul meets the Care Plan requirement. This Care Plan has been established and reviewed with the Patient.    Counseling Resources:  ATP IV Guidelines  Pooled Cohorts Equation Calculator  Breast Cancer Risk Calculator  Breast Cancer: Medication to Reduce Risk  FRAX Risk Assessment  ICSI Preventive Guidelines  Dietary Guidelines for Americans, 2010  [employer] MyPlate  ASA Prophylaxis  Lung CA Screening    Jose D Vasquez MD,   [facility]      Patient Instructions   Everything looks fine!    Refills of medications have been faxed to your pharmacy.     Lab results will be available soon on [facility].    See me in a year, sooner if problems.

## 2021-08-13 LAB
ALBUMIN SERPL-MCNC: 3.8 G/DL (ref 3.4–5)
ALP SERPL-CCNC: 79 U/L (ref 40–150)
ALT SERPL W P-5'-P-CCNC: 33 U/L (ref 0–70)
ANION GAP SERPL CALCULATED.3IONS-SCNC: 6 MMOL/L (ref 3–14)
AST SERPL W P-5'-P-CCNC: 23 U/L (ref 0–45)
BILIRUB SERPL-MCNC: 0.8 MG/DL (ref 0.2–1.3)
BUN SERPL-MCNC: 17 MG/DL (ref 7–30)
CALCIUM SERPL-MCNC: 9.7 MG/DL (ref 8.5–10.1)
CHLORIDE BLD-SCNC: 107 MMOL/L (ref 94–109)
CHOLEST SERPL-MCNC: 162 MG/DL
CO2 SERPL-SCNC: 26 MMOL/L (ref 20–32)
CREAT SERPL-MCNC: 1.11 MG/DL (ref 0.66–1.25)
FASTING STATUS PATIENT QL REPORTED: YES
GFR SERPL CREATININE-BSD FRML MDRD: 61 ML/MIN/1.73M2
GLUCOSE BLD-MCNC: 78 MG/DL (ref 70–99)
HDLC SERPL-MCNC: 55 MG/DL
LDLC SERPL CALC-MCNC: 69 MG/DL
NONHDLC SERPL-MCNC: 107 MG/DL
POTASSIUM BLD-SCNC: 4 MMOL/L (ref 3.4–5.3)
PROT SERPL-MCNC: 7 G/DL (ref 6.8–8.8)
SODIUM SERPL-SCNC: 139 MMOL/L (ref 133–144)
TRIGL SERPL-MCNC: 190 MG/DL
TSH SERPL DL<=0.005 MIU/L-ACNC: 1.24 MU/L (ref 0.4–4)

## 2021-08-29 PROBLEM — E66.01 MORBID OBESITY (H): Status: ACTIVE | Noted: 2021-08-29

## 2021-09-10 NOTE — PROGRESS NOTES
"Raul Younger is a 85 year old male being evaluated via a billable telephone visit.     \"This telephone visit will be conducted via a call between you and your physician/provider. We have found that certain health care needs can be provided without the need for an in-person visit or physical exam.  This service lets us provide the care you need with a telephone conversation.  If a prescription is necessary we can send it directly to your pharmacy.  If lab work is needed we can place an order for that and you can then stop by our lab to have the test done at a later time.\"    Telephone visits are billed at different rates depending on your insurance coverage.  Please reach out to your insurance provider with any questions.    Patient has given verbal consent for  a Telephone visit? Yes      Yulisa Monique [facility]    Telephone Visit Details:     Telephone Visit Start Time: 11:45 AM    Telephone Visit End Time:12:00 PM      Obstructive Sleep Apnea - PAP Follow-Up Visit:    Chief Complaint   Patient presents with     CPAP Follow Up       Raul Younger comes in today for follow-up of their mild sleep apnea, managed with CPAP.      His sleep study from 2007 showed an apnea-hypopnea index of 11.4 per hour and RDI of 25.5 per hour.     Overall, he rates the experience with PAP as 10 (0 poor, 10 great). The mask is comfortable. The mask is not leaking.  He is not snoring with the mask on. He is not having gasp arousals.  He is not having significant oral/nasal dryness. The pressure settings are comfortable.     He uses nasal mask.     Bedtime is typically 11 PM- 12 AM. Usually it takes about 10 minutes to fall asleep with the mask on. Wake time is typically 8 am.  Patient is using PAP therapy 9 hours per night. The patient is usually getting 9 hours of sleep per night.    He does feel rested in the morning.    Respironics  CPAP 12 cmH2O download:  30/30 total days of use. 0 nonuse days.  Average use 9 hours 11 " "minutes per day.  100% days with >4 hours use.  Large leak 23 mins per day average.  AHI 1.9.     Reviewed by team: Tobacco  Allergies  Meds            Reviewed by provider:                Problem List:  Patient Active Problem List    Diagnosis Date Noted     Morbid obesity (H) 08/29/2021     Priority: Medium     MIGUEL (obstructive sleep apnea) 08/01/2019     Priority: Medium     RA (rheumatoid arthritis) (H) 02/19/2015     Priority: Medium     2014. Treat with Pulse doses of Prednisone by Rheumatologist for \"exacerbations/flare-ups\"       History of colonic polyps 02/19/2015     Priority: Medium     Diagnosed 12/2011 due to for colonoscopy 2016  SPECIMEN(S):  Colon polyp ascending    FINAL DIAGNOSIS:  Ascending colon polyp, biopsy - Small fragment of mucosa without  diagnostic evidence of hyperplastic or adenomatous polyp. Negative for  dysplasia and malignancy.  Problem list name updated by automated process. Provider to review       Advanced directives, counseling/discussion 12/08/2011     Priority: Medium     Pt already has a HCD and will bring in a copy.     Shauna Mosquera MA         HYPERLIPIDEMIA LDL GOAL <130 10/31/2010     Priority: Medium     Prostate cancer (H) 01/26/2010     Priority: Medium     Cryosurgery  January 2011       Chronic rhinitis 06/02/2008     Priority: Medium     (Problem list name updated by automated process. Provider to review and confirm.)       Essential hypertension, benign 02/06/2003     Priority: Medium          There were no vitals taken for this visit.    Impression/Plan:     Mild sleep apnea.     - Tolerating PAP well. Daytime symptoms are improved.    -Download data from his CPAP was reviewed which shows regular compliance and a normal AHI.    -Patient's Caesar Respironics CPAP device is under recall.  We reviewed details of the recall and risks.  Patient made in an informed decision to continue using his recallED device because of his symptomatic burden.  Because of age " of his device, we should try and get him a replacement device.     Plan:     - Continue CPAP therapy at 12 cm H2O     Raul E Carisa will follow up in about 1 year(s).     Fifteen minutes spent with patient, all of which were spent counseling, consulting, coordinating plan of care.      Yoan Maria MD    CC:  Jose D Vasquez,

## 2021-09-13 ENCOUNTER — VIRTUAL VISIT (OUTPATIENT)
Dept: SLEEP MEDICINE | Facility: CLINIC | Age: 86
End: 2021-09-13
Payer: COMMERCIAL

## 2021-09-13 DIAGNOSIS — G47.33 OSA (OBSTRUCTIVE SLEEP APNEA): Primary | ICD-10-CM

## 2021-09-13 PROCEDURE — 99442 PR PHYSICIAN TELEPHONE EVALUATION 11-20 MIN: CPT | Performed by: INTERNAL MEDICINE

## 2021-10-23 ENCOUNTER — HEALTH MAINTENANCE LETTER (OUTPATIENT)
Age: 86
End: 2021-10-23

## 2021-11-05 ENCOUNTER — LAB (OUTPATIENT)
Dept: LAB | Facility: CLINIC | Age: 86
End: 2021-11-05
Payer: COMMERCIAL

## 2021-11-05 DIAGNOSIS — C61 PROSTATE CANCER (H): ICD-10-CM

## 2021-11-05 PROCEDURE — 36415 COLL VENOUS BLD VENIPUNCTURE: CPT

## 2021-11-05 PROCEDURE — 84153 ASSAY OF PSA TOTAL: CPT

## 2021-11-06 LAB — PSA SERPL-MCNC: 0.56 UG/L (ref 0–4)

## 2021-12-17 ENCOUNTER — OFFICE VISIT (OUTPATIENT)
Dept: UROLOGY | Facility: CLINIC | Age: 86
End: 2021-12-17
Payer: COMMERCIAL

## 2021-12-17 VITALS
BODY MASS INDEX: 34.8 KG/M2 | DIASTOLIC BLOOD PRESSURE: 80 MMHG | HEIGHT: 69 IN | WEIGHT: 235 LBS | OXYGEN SATURATION: 98 % | HEART RATE: 61 BPM | SYSTOLIC BLOOD PRESSURE: 138 MMHG

## 2021-12-17 DIAGNOSIS — R39.15 URINARY URGENCY: ICD-10-CM

## 2021-12-17 DIAGNOSIS — C61 PROSTATE CANCER (H): Primary | ICD-10-CM

## 2021-12-17 DIAGNOSIS — R35.0 URINARY FREQUENCY: ICD-10-CM

## 2021-12-17 LAB
ALBUMIN UR-MCNC: NEGATIVE MG/DL
APPEARANCE UR: CLEAR
BILIRUB UR QL STRIP: NEGATIVE
COLOR UR AUTO: YELLOW
GLUCOSE UR STRIP-MCNC: NEGATIVE MG/DL
HGB UR QL STRIP: NEGATIVE
KETONES UR STRIP-MCNC: NEGATIVE MG/DL
LEUKOCYTE ESTERASE UR QL STRIP: NEGATIVE
NITRATE UR QL: NEGATIVE
PH UR STRIP: 7 [PH] (ref 5–7)
RESIDUAL VOLUME (RV) (EXTERNAL): 60
SP GR UR STRIP: 1.01 (ref 1–1.03)
UROBILINOGEN UR STRIP-ACNC: 0.2 E.U./DL

## 2021-12-17 PROCEDURE — 51798 US URINE CAPACITY MEASURE: CPT | Performed by: PHYSICIAN ASSISTANT

## 2021-12-17 PROCEDURE — 99213 OFFICE O/P EST LOW 20 MIN: CPT | Mod: 25 | Performed by: PHYSICIAN ASSISTANT

## 2021-12-17 PROCEDURE — 81003 URINALYSIS AUTO W/O SCOPE: CPT | Mod: QW | Performed by: PHYSICIAN ASSISTANT

## 2021-12-17 ASSESSMENT — PAIN SCALES - GENERAL: PAINLEVEL: NO PAIN (0)

## 2021-12-17 ASSESSMENT — MIFFLIN-ST. JEOR: SCORE: 1746.33

## 2021-12-17 NOTE — LETTER
12/17/2021       RE: Raul Younger  [address] [address] [address]     Dear Colleague,    Thank you for referring your patient, Raul Younger, to the [facility] [place] at [facility]. Please see a copy of my visit note below.    Subjective      CHIEF COMPLAINT/REASON FOR VISIT   Patient of Dr. Celeste's seen on my calendar  Prostate cancer recheck  Urinary urgency and frequency     HISTORY OF PRESENT ILLNESS   Mr. Younger is a pleasant 86-year-old gentleman today for follow-up regarding prostate cancer. 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 He is treated with cryoablation of the prostate in 2010 by Dr. Nicolas. His PSA was undetectable until July 2016 when he became 0.05. Most recently in November 2019, patient's PSA was 0.56.    Patient is not on any medication for his prostate. He previously has been on Flomax and finasteride did not feel that it helped his urinary symptoms. He does feel like he is having increased urgency and frequency during the daytime. He denies significant nocturia. Postvoid residual today is 60 mL. He does note that he drinks a ton of coffee. This is a mix of decaf and caffeinated. He also drinks some water. He would estimate he drinks approximately 32 ounces of coffee daily. Patient does endorse urinary incontinence if he waits too long which is consistent with urge incontinence.    The following portions of the patient's history were reviewed and updated as appropriate: allergies, current medications, past family history, past medical history, past social history, past surgical history, and problem list.     REVIEW OF SYSTEMS   Review of Systems   Constitutional: Negative for appetite change, chills, fatigue, fever and unexpected weight change.   Respiratory: Negative for shortness of breath.    Cardiovascular: Negative for chest pain.  "  Gastrointestinal: Negative for nausea and vomiting.   Genitourinary: Positive for frequency and urgency. Negative for dysuria and hematuria.      Per HPI.     Patient Active Problem List   Diagnosis     Essential hypertension, benign     Chronic rhinitis     Prostate cancer (H)     HYPERLIPIDEMIA LDL GOAL <130     Advanced directives, counseling/discussion     RA (rheumatoid arthritis) (H)     History of colonic polyps     MIGUEL (obstructive sleep apnea)     Morbid obesity (H)      Past Medical History:   Diagnosis Date     Arthritis      HTN      Malignant neoplasm (H) 01/2011    Prostate cancer     MI, old     vs. asthma per pt.'s wife     Migraine, unspecified, without mention of intractable migraine without mention of status migrainosus     Abstracted 5/15/02.     MIGUEL (obstructive sleep apnea)      Other and unspecified hyperlipidemia     Abstracted 5/15/02.     Other and unspecified hyperlipidemia      Sleep apnea       Objective      PHYSICAL EXAM   /80   Pulse 61   Ht 1.753 m (5' 9\")   Wt 106.6 kg (235 lb)   SpO2 98%   BMI 34.70 kg/m     Physical Exam  Constitutional:       Appearance: Normal appearance.   HENT:      Head: Normocephalic.      Nose: Nose normal.   Eyes:      General: No scleral icterus.  Pulmonary:      Effort: Pulmonary effort is normal.   Abdominal:      General: There is no distension.   Musculoskeletal:         General: Normal range of motion.      Cervical back: Normal range of motion.   Neurological:      General: No focal deficit present.      Mental Status: He is alert and oriented to person, place, and time.   Psychiatric:         Mood and Affect: Mood normal.         Behavior: Behavior normal.       LABORATORY     Lab Results   Component Value Date    PSA 0.56 11/05/2021    PSA 0.56 11/07/2019      Recent Labs   Lab Test 12/17/21  1457 09/16/17  2143   COLOR Yellow Straw   APPEARANCE Clear Clear   URINEGLC Negative Negative   URINEBILI Negative Negative   URINEKETONE " Negative Negative   SG 1.015 1.014   UBLD Negative Negative   URINEPH 7.0 6.0   PROTEIN Negative Negative   UROBILINOGEN 0.2  --    NITRITE Negative Negative   LEUKEST Negative Negative   RBCU  --  1   WBCU  --  <1     TESTING    PVR: 60 mL.    Assessment & Plan    1. Prostate cancer (H)    2. Urinary frequency    3. Urinary urgency      I had the pleasure today of meeting with Mr. Younger to discuss follow-up for his prostate cancer. His PSA has remained stable at 0.56. This is what it was 2 years ago. Based upon stability, we can continue with every 2-year PSA.    -Follow up in 2 years with PSA and return visit.    We also discussed his urinary symptomatology including worsening urgency and frequency. He is emptying his bladder adequately. He has previously trialed an alpha-blocker as well as 5 alpha reductase inhibitor without improvement in urinary symptomatology. He is uncertain if it is bad enough that he would want to do something.    -Discussed that if this worsens, please contact us and we can trial an overactive bladder medication. We also discussed the role of bladder irritants. Patient was provided with a list of these to use as a guideline.    In the interim, contact us with any questions, concerns, or changes in symptomatology. Otherwise follow-up as outlined above.      Signed by:       Lashell Armstrong PA-C 12/17/2021 4:48 PM

## 2021-12-17 NOTE — NURSING NOTE
Chief Complaint   Patient presents with     Prostate Cancer     Review latest PSA     Pt. Notes increase in urinary frequency and urgency.    PVR:  60 mL     Asia Pérez, EMT

## 2021-12-17 NOTE — PROGRESS NOTES
Subjective      CHIEF COMPLAINT/REASON FOR VISIT   Patient of Dr. Celeste's seen on my calendar  Prostate cancer recheck  Urinary urgency and frequency     HISTORY OF PRESENT ILLNESS   Mr. Younger is a pleasant 86-year-old gentleman today for follow-up regarding prostate cancer. He was originally diagnosed with Alpine 4+4 = 8 adenocarcinoma the prostate. He is treated with cryoablation of the prostate in 2010 by Dr. Nicolas. His PSA was undetectable until July 2016 when he became 0.05. Most recently in November 2019, patient's PSA was 0.56.    Patient is not on any medication for his prostate. He previously has been on Flomax and finasteride did not feel that it helped his urinary symptoms. He does feel like he is having increased urgency and frequency during the daytime. He denies significant nocturia. Postvoid residual today is 60 mL. He does note that he drinks a ton of coffee. This is a mix of decaf and caffeinated. He also drinks some water. He would estimate he drinks approximately 32 ounces of coffee daily. Patient does endorse urinary incontinence if he waits too long which is consistent with urge incontinence.    The following portions of the patient's history were reviewed and updated as appropriate: allergies, current medications, past family history, past medical history, past social history, past surgical history, and problem list.     REVIEW OF SYSTEMS   Review of Systems   Constitutional: Negative for appetite change, chills, fatigue, fever and unexpected weight change.   Respiratory: Negative for shortness of breath.    Cardiovascular: Negative for chest pain.   Gastrointestinal: Negative for nausea and vomiting.   Genitourinary: Positive for frequency and urgency. Negative for dysuria and hematuria.      Per HPI.     Patient Active Problem List   Diagnosis     Essential hypertension, benign     Chronic rhinitis     Prostate cancer (H)     HYPERLIPIDEMIA LDL GOAL <130     Advanced directives,  "counseling/discussion     RA (rheumatoid arthritis) (H)     History of colonic polyps     MIGUEL (obstructive sleep apnea)     Morbid obesity (H)      Past Medical History:   Diagnosis Date     Arthritis      HTN      Malignant neoplasm (H) 01/2011    Prostate cancer     MI, old     vs. asthma per pt.'s wife     Migraine, unspecified, without mention of intractable migraine without mention of status migrainosus     Abstracted 5/15/02.     MIGUEL (obstructive sleep apnea)      Other and unspecified hyperlipidemia     Abstracted 5/15/02.     Other and unspecified hyperlipidemia      Sleep apnea       Objective      PHYSICAL EXAM   /80   Pulse 61   Ht 1.753 m (5' 9\")   Wt 106.6 kg (235 lb)   SpO2 98%   BMI 34.70 kg/m     Physical Exam  Constitutional:       Appearance: Normal appearance.   HENT:      Head: Normocephalic.      Nose: Nose normal.   Eyes:      General: No scleral icterus.  Pulmonary:      Effort: Pulmonary effort is normal.   Abdominal:      General: There is no distension.   Musculoskeletal:         General: Normal range of motion.      Cervical back: Normal range of motion.   Neurological:      General: No focal deficit present.      Mental Status: He is alert and oriented to person, place, and time.   Psychiatric:         Mood and Affect: Mood normal.         Behavior: Behavior normal.       LABORATORY     Lab Results   Component Value Date    PSA 0.56 11/05/2021    PSA 0.56 11/07/2019      Recent Labs   Lab Test 12/17/21  1457 09/16/17  2143   COLOR Yellow Straw   APPEARANCE Clear Clear   URINEGLC Negative Negative   URINEBILI Negative Negative   URINEKETONE Negative Negative   SG 1.015 1.014   UBLD Negative Negative   URINEPH 7.0 6.0   PROTEIN Negative Negative   UROBILINOGEN 0.2  --    NITRITE Negative Negative   LEUKEST Negative Negative   RBCU  --  1   WBCU  --  <1     TESTING    PVR: 60 mL.    Assessment & Plan    1. Prostate cancer (H)    2. Urinary frequency    3. Urinary urgency      I " had the pleasure today of meeting with Mr. Younger to discuss follow-up for his prostate cancer. His PSA has remained stable at 0.56. This is what it was 2 years ago. Based upon stability, we can continue with every 2-year PSA.    -Follow up in 2 years with PSA and return visit.    We also discussed his urinary symptomatology including worsening urgency and frequency. He is emptying his bladder adequately. He has previously trialed an alpha-blocker as well as 5 alpha reductase inhibitor without improvement in urinary symptomatology. He is uncertain if it is bad enough that he would want to do something.    -Discussed that if this worsens, please contact us and we can trial an overactive bladder medication. We also discussed the role of bladder irritants. Patient was provided with a list of these to use as a guideline.    In the interim, contact us with any questions, concerns, or changes in symptomatology. Otherwise follow-up as outlined above.      Signed by:       Lashell Armstrong PA-C 12/17/2021 4:48 PM

## 2021-12-17 NOTE — PATIENT INSTRUCTIONS
Follow up in 2 years with PSA and return visit.    Let me know if urinary urgency and frequency and worsens.  We can trial an overactive bladder medicine if you would like. [phone].    Below is a list of things that can irritate the bladder and should try to remove to see if it improves your symptoms:       Caffeinated soft drinks.    Coffee.    Tea.    Chocolate.    Tomato-based foods.    Acidic juices and fruits. (includes cranberry juice)    Alcohol.    Carbonated drinks.    Aspartame/Nutrasweet (artificial sweeteners)    Vitamin C supplements and citrus fruit    Spicy food

## 2021-12-20 ASSESSMENT — ENCOUNTER SYMPTOMS
VOMITING: 0
UNEXPECTED WEIGHT CHANGE: 0
CHILLS: 0
SHORTNESS OF BREATH: 0
FREQUENCY: 1
FEVER: 0
NAUSEA: 0
FATIGUE: 0
DYSURIA: 0
APPETITE CHANGE: 0
HEMATURIA: 0

## 2022-01-07 ENCOUNTER — TELEPHONE (OUTPATIENT)
Dept: INTERNAL MEDICINE | Facility: CLINIC | Age: 87
End: 2022-01-07
Payer: COMMERCIAL

## 2022-01-07 DIAGNOSIS — U07.1 INFECTION DUE TO 2019 NOVEL CORONAVIRUS: Primary | ICD-10-CM

## 2022-01-11 ENCOUNTER — TELEPHONE (OUTPATIENT)
Dept: INTERNAL MEDICINE | Facility: CLINIC | Age: 87
End: 2022-01-11
Payer: COMMERCIAL

## 2022-01-11 DIAGNOSIS — R05.9 COUGH: ICD-10-CM

## 2022-01-11 DIAGNOSIS — U07.1 INFECTION DUE TO 2019 NOVEL CORONAVIRUS: Primary | ICD-10-CM

## 2022-01-11 NOTE — TELEPHONE ENCOUNTER
Patient calling  He has COVID  His coughing is getting worse. No fever and other symptoms has gotten better. Patient would like to know what medication he can get that is stronger than OTC. Patient uses [facility] [address] in [place]. Ok to call and  [phone]

## 2022-01-13 ENCOUNTER — TELEPHONE (OUTPATIENT)
Dept: INTERNAL MEDICINE | Facility: CLINIC | Age: 87
End: 2022-01-13

## 2022-01-13 RX ORDER — CODEINE PHOSPHATE AND GUAIFENESIN 10; 100 MG/5ML; MG/5ML
1-2 SOLUTION ORAL
Qty: 120 ML | Refills: 1 | Status: SHIPPED | OUTPATIENT
Start: 2022-01-13 | End: 2022-10-26

## 2022-01-13 NOTE — TELEPHONE ENCOUNTER
PA Initiation    Medication: guaiFENesin-codeine (ROBITUSSIN AC) 100-10 MG/5ML solution  Insurance Company: [employer] ([facility]) - Phone [phone] Fax [phone]  Pharmacy Filling the Rx: [id number] [place], [place] - [address] [place] [address] [address] [facility]  Filling Pharmacy Phone: [phone]  Filling Pharmacy Fax:    Start Date: 1/13/2022    CENTRAL PRIOR AUTHORIZATION TEAM  Ph:[phone]

## 2022-01-13 NOTE — TELEPHONE ENCOUNTER
PRIOR AUTHORIZATION DENIED    Medication: guaiFENesin-codeine (ROBITUSSIN AC) 100-10 MG/5ML solution    Denial Date:01/13/2022   Denial Rational:       Appeal Information: None

## 2022-01-13 NOTE — TELEPHONE ENCOUNTER
Okay to try using Robitussin AC (with codeine) at bedtime as needed for cough.  E-prescribed Rx to [facility].     Please advise pt.

## 2022-01-13 NOTE — TELEPHONE ENCOUNTER
Prior Authorization Retail Medication Request    Medication/Dose: guaiFENesin-codeine (ROBITUSSIN AC) 100-10 MG/5ML solution  ICD code (if different than what is on RX):  Cough [R05.9]   Previously Tried and Failed:    Rationale:      Insurance Name:    Insurance ID:        Pharmacy Information (if different than what is on RX)  Name:    Phone:

## 2022-05-02 ENCOUNTER — TRANSFERRED RECORDS (OUTPATIENT)
Dept: HEALTH INFORMATION MANAGEMENT | Facility: CLINIC | Age: 87
End: 2022-05-02
Payer: COMMERCIAL

## 2022-08-24 DIAGNOSIS — E78.5 HYPERLIPIDEMIA LDL GOAL <130: ICD-10-CM

## 2022-08-26 RX ORDER — SIMVASTATIN 20 MG
20 TABLET ORAL AT BEDTIME
Qty: 90 TABLET | Refills: 0 | OUTPATIENT
Start: 2022-08-26

## 2022-10-10 ENCOUNTER — HEALTH MAINTENANCE LETTER (OUTPATIENT)
Age: 87
End: 2022-10-10

## 2022-10-25 ASSESSMENT — ENCOUNTER SYMPTOMS
PALPITATIONS: 0
PARESTHESIAS: 0
HEMATOCHEZIA: 0
HEADACHES: 0
SORE THROAT: 0
ARTHRALGIAS: 1
ABDOMINAL PAIN: 0
DYSURIA: 0
EYE PAIN: 0
DIZZINESS: 0
NAUSEA: 0
COUGH: 0
FREQUENCY: 1
NERVOUS/ANXIOUS: 0
WEAKNESS: 0
JOINT SWELLING: 0
HEARTBURN: 0
CHILLS: 0
MYALGIAS: 0
CONSTIPATION: 0
HEMATURIA: 0
FEVER: 0
DIARRHEA: 0
SHORTNESS OF BREATH: 0

## 2022-10-25 ASSESSMENT — ACTIVITIES OF DAILY LIVING (ADL): CURRENT_FUNCTION: NO ASSISTANCE NEEDED

## 2022-10-26 ENCOUNTER — OFFICE VISIT (OUTPATIENT)
Dept: INTERNAL MEDICINE | Facility: CLINIC | Age: 87
End: 2022-10-26
Payer: COMMERCIAL

## 2022-10-26 VITALS
WEIGHT: 230 LBS | SYSTOLIC BLOOD PRESSURE: 136 MMHG | OXYGEN SATURATION: 100 % | DIASTOLIC BLOOD PRESSURE: 72 MMHG | HEIGHT: 68 IN | BODY MASS INDEX: 34.86 KG/M2 | RESPIRATION RATE: 16 BRPM | TEMPERATURE: 98.8 F | HEART RATE: 78 BPM

## 2022-10-26 DIAGNOSIS — Z00.00 ENCOUNTER FOR MEDICARE ANNUAL WELLNESS EXAM: Primary | ICD-10-CM

## 2022-10-26 DIAGNOSIS — M05.9 RHEUMATOID ARTHRITIS WITH POSITIVE RHEUMATOID FACTOR, INVOLVING UNSPECIFIED SITE (H): ICD-10-CM

## 2022-10-26 DIAGNOSIS — E78.5 HYPERLIPIDEMIA LDL GOAL <130: ICD-10-CM

## 2022-10-26 DIAGNOSIS — E66.811 CLASS 1 OBESITY WITH SERIOUS COMORBIDITY AND BODY MASS INDEX (BMI) OF 34.0 TO 34.9 IN ADULT, UNSPECIFIED OBESITY TYPE: ICD-10-CM

## 2022-10-26 DIAGNOSIS — Z79.899 MEDICATION MANAGEMENT: ICD-10-CM

## 2022-10-26 DIAGNOSIS — G47.33 OSA (OBSTRUCTIVE SLEEP APNEA): ICD-10-CM

## 2022-10-26 DIAGNOSIS — I10 ESSENTIAL HYPERTENSION, BENIGN: ICD-10-CM

## 2022-10-26 DIAGNOSIS — C61 PROSTATE CANCER (H): ICD-10-CM

## 2022-10-26 PROBLEM — E66.01 MORBID OBESITY (H): Status: RESOLVED | Noted: 2021-08-29 | Resolved: 2022-10-26

## 2022-10-26 PROCEDURE — 84443 ASSAY THYROID STIM HORMONE: CPT | Performed by: INTERNAL MEDICINE

## 2022-10-26 PROCEDURE — 80053 COMPREHEN METABOLIC PANEL: CPT | Performed by: INTERNAL MEDICINE

## 2022-10-26 PROCEDURE — 36415 COLL VENOUS BLD VENIPUNCTURE: CPT | Performed by: INTERNAL MEDICINE

## 2022-10-26 PROCEDURE — 99214 OFFICE O/P EST MOD 30 MIN: CPT | Mod: 25 | Performed by: INTERNAL MEDICINE

## 2022-10-26 PROCEDURE — G0439 PPPS, SUBSEQ VISIT: HCPCS | Performed by: INTERNAL MEDICINE

## 2022-10-26 PROCEDURE — 80061 LIPID PANEL: CPT | Performed by: INTERNAL MEDICINE

## 2022-10-26 RX ORDER — SIMVASTATIN 20 MG
20 TABLET ORAL AT BEDTIME
Qty: 90 TABLET | Refills: 3 | Status: SHIPPED | OUTPATIENT
Start: 2022-10-26 | End: 2023-11-03

## 2022-10-26 RX ORDER — TRIAMTERENE/HYDROCHLOROTHIAZID 37.5-25 MG
1 TABLET ORAL DAILY
Qty: 90 TABLET | Refills: 3 | Status: SHIPPED | OUTPATIENT
Start: 2022-10-26 | End: 2024-01-05

## 2022-10-26 RX ORDER — OMEGA-3 FATTY ACIDS/FISH OIL 300-1000MG
1000 CAPSULE ORAL DAILY
COMMUNITY

## 2022-10-26 RX ORDER — AMLODIPINE BESYLATE 2.5 MG/1
5 TABLET ORAL DAILY
Qty: 180 TABLET | Refills: 3 | Status: SHIPPED | OUTPATIENT
Start: 2022-10-26 | End: 2024-01-05

## 2022-10-26 SDOH — ECONOMIC STABILITY: INCOME INSECURITY: IN THE LAST 12 MONTHS, WAS THERE A TIME WHEN YOU WERE NOT ABLE TO PAY THE MORTGAGE OR RENT ON TIME?: NO

## 2022-10-26 SDOH — ECONOMIC STABILITY: FOOD INSECURITY: WITHIN THE PAST 12 MONTHS, YOU WORRIED THAT YOUR FOOD WOULD RUN OUT BEFORE YOU GOT MONEY TO BUY MORE.: NEVER TRUE

## 2022-10-26 SDOH — HEALTH STABILITY: PHYSICAL HEALTH: ON AVERAGE, HOW MANY DAYS PER WEEK DO YOU ENGAGE IN MODERATE TO STRENUOUS EXERCISE (LIKE A BRISK WALK)?: 3 DAYS

## 2022-10-26 SDOH — ECONOMIC STABILITY: FOOD INSECURITY: WITHIN THE PAST 12 MONTHS, THE FOOD YOU BOUGHT JUST DIDN'T LAST AND YOU DIDN'T HAVE MONEY TO GET MORE.: NEVER TRUE

## 2022-10-26 SDOH — HEALTH STABILITY: PHYSICAL HEALTH: ON AVERAGE, HOW MANY MINUTES DO YOU ENGAGE IN EXERCISE AT THIS LEVEL?: 60 MIN

## 2022-10-26 ASSESSMENT — ENCOUNTER SYMPTOMS
EYE PAIN: 0
HEMATOCHEZIA: 0
PALPITATIONS: 0
DIZZINESS: 0
ABDOMINAL PAIN: 0
ARTHRALGIAS: 1
COUGH: 0
CHILLS: 0
PARESTHESIAS: 0
SORE THROAT: 0
CONSTIPATION: 0
HEMATURIA: 0
DYSURIA: 0
SHORTNESS OF BREATH: 0
WEAKNESS: 0
MYALGIAS: 0
JOINT SWELLING: 0
DIARRHEA: 0
NAUSEA: 0
FREQUENCY: 1
HEADACHES: 0
FEVER: 0
HEARTBURN: 0
NERVOUS/ANXIOUS: 0

## 2022-10-26 ASSESSMENT — SOCIAL DETERMINANTS OF HEALTH (SDOH)
WITHIN THE LAST YEAR, HAVE TO BEEN RAPED OR FORCED TO HAVE ANY KIND OF SEXUAL ACTIVITY BY YOUR PARTNER OR EX-PARTNER?: NO
HOW HARD IS IT FOR YOU TO PAY FOR THE VERY BASICS LIKE FOOD, HOUSING, MEDICAL CARE, AND HEATING?: NOT VERY HARD
WITHIN THE LAST YEAR, HAVE YOU BEEN KICKED, HIT, SLAPPED, OR OTHERWISE PHYSICALLY HURT BY YOUR PARTNER OR EX-PARTNER?: NO
WITHIN THE LAST YEAR, HAVE YOU BEEN HUMILIATED OR EMOTIONALLY ABUSED IN OTHER WAYS BY YOUR PARTNER OR EX-PARTNER?: NO
WITHIN THE LAST YEAR, HAVE YOU BEEN AFRAID OF YOUR PARTNER OR EX-PARTNER?: NO

## 2022-10-26 ASSESSMENT — LIFESTYLE VARIABLES
AUDIT-C TOTAL SCORE: 1
HOW OFTEN DO YOU HAVE SIX OR MORE DRINKS ON ONE OCCASION: NEVER
SKIP TO QUESTIONS 9-10: 1
HOW OFTEN DO YOU HAVE A DRINK CONTAINING ALCOHOL: MONTHLY OR LESS
HOW MANY STANDARD DRINKS CONTAINING ALCOHOL DO YOU HAVE ON A TYPICAL DAY: 1 OR 2

## 2022-10-26 ASSESSMENT — ACTIVITIES OF DAILY LIVING (ADL): CURRENT_FUNCTION: NO ASSISTANCE NEEDED

## 2022-10-26 NOTE — PROGRESS NOTES
"SUBJECTIVE:   Raul is a 87 year old who presents for Preventive Visit.      Patient has been advised of split billing requirements and indicates understanding: Yes  Are you in the first 12 months of your Medicare coverage?  No    Healthy Habits:     In general, how would you rate your overall health?  Good    Frequency of exercise:  2-3 days/week    Duration of exercise:  45-60 minutes    Do you usually eat at least 4 servings of fruit and vegetables a day, include whole grains    & fiber and avoid regularly eating high fat or \"junk\" foods?  No    Taking medications regularly:  Yes    Medication side effects:  Not applicable    Ability to successfully perform activities of daily living:  No assistance needed    Home Safety:  No safety concerns identified    Hearing Impairment:  Difficulty following a conversation in a noisy restaurant or crowded room, feel that people are mumbling or not speaking clearly, need to ask people to speak up or repeat themselves and find that men's voices are easier to understand than woman's    In the past 6 months, have you been bothered by leaking of urine? Yes    In general, how would you rate your overall mental or emotional health?  Excellent      PHQ-2 Total Score: 0    Additional concerns today:  No           Do you feel safe in your environment? Yes    Have you ever done Advance Care Planning? (For example, a Health Directive, POLST, or a discussion with a medical provider or your loved ones about your wishes): Yes, patient states has an Advance Care Planning document and will bring a copy to the clinic.       Fall risk  Fallen 2 or more times in the past year?: No  Any fall with injury in the past year?: No    Cognitive Screening   1) Repeat 3 items (Leader, Season, Table)    2) Clock draw: NORMAL  3) 3 item recall: Recalls 3 objects  Results: 3 items recalled: COGNITIVE IMPAIRMENT LESS LIKELY    Mini-CogTM Copyright S [employer]. Licensed by the author for use in [facility] " Services; reprinted with permission (soob@[email or web address]). All rights reserved.      Do you have sleep apnea, excessive snoring or daytime drowsiness?: no, uses CPAP    Reviewed and updated as needed this visit by clinical staff   Tobacco  Allergies  Meds   Med Hx  Surg Hx  Fam Hx  Soc Hx        Reviewed and updated as needed this visit by Provider                 Social History     Tobacco Use     Smoking status: Former     Packs/day: 1.00     Years: 10.00     Pack years: 10.00     Types: Cigarettes, Pipe     Quit date: 1972     Years since quittin.8     Smokeless tobacco: Never   Substance Use Topics     Alcohol use: Yes     Comment: Infrequent wine/beer     If you drink alcohol do you typically have >3 drinks per day or >7 drinks per week? No    Alcohol Use 10/25/2022   Prescreen: >3 drinks/day or >7 drinks/week? Not Applicable   Prescreen: >3 drinks/day or >7 drinks/week? -       PROBLEMS TO ADD ON...In addition to an Annual Wellness Exam, we addressed hypertension, hyperlipidemia, obesity.     The patient expresses interest in weight reduction. We discussed that this might help in improving BP and lipids. He is offered a Comprehensive Weight Management referral.    The patient is tolerating his current medications without any adverse effects.    His BP appears satisfactorily controlled on amlodipine and Maxzide-25.  LDL-Cholesterol historically well controlled on simvastatin.   We agreed to update needed labs, including a lipid panel.           Current providers sharing in care for this patient include:   Patient Care Team:  Jose D Vasquez MD as PCP - General (Internal Medicine)  Jose D Vasquez MD as Assigned PCP  Yoan Maria MD as Assigned Sleep Provider  Lashell Armstrong PA-C as Assigned Surgical Provider    The following health maintenance items are reviewed in Epic and correct as of today:  Health Maintenance   Topic Date Due     HEPATITIS B IMMUNIZATION (1 of 3 - 3-dose series) Never  "done     COVID-19 Vaccine (5 - Booster for Moderna series) 06/30/2022     MEDICARE ANNUAL WELLNESS VISIT  08/12/2022     ANNUAL REVIEW OF HM ORDERS  08/29/2022     INFLUENZA VACCINE (1) 09/01/2022     FALL RISK ASSESSMENT  10/26/2023     ADVANCE CARE PLANNING  08/29/2026     DTAP/TDAP/TD IMMUNIZATION (4 - Td or Tdap) 10/09/2028     PHQ-2 (once per calendar year)  Completed     Pneumococcal Vaccine: 65+ Years  Completed     ZOSTER IMMUNIZATION  Completed     IPV IMMUNIZATION  Aged Out     MENINGITIS IMMUNIZATION  Aged Out     Pneumonia Vaccine:  Patient has received both pneumonia vaccinations (Prevnar-13 and Pneumovax-23)       Review of Systems   Constitutional: Negative for chills and fever.   HENT: Negative for congestion, ear pain, hearing loss and sore throat.    Eyes: Positive for visual disturbance. Negative for pain.   Respiratory: Negative for cough and shortness of breath.    Cardiovascular: Negative for chest pain, palpitations and peripheral edema.   Gastrointestinal: Negative for abdominal pain, constipation, diarrhea, heartburn, hematochezia and nausea.   Genitourinary: Positive for frequency, impotence, penile discharge and urgency. Negative for dysuria, genital sores and hematuria.   Musculoskeletal: Positive for arthralgias. Negative for joint swelling and myalgias.   Skin: Negative for rash.   Neurological: Negative for dizziness, weakness, headaches and paresthesias.   Psychiatric/Behavioral: Negative for mood changes. The patient is not nervous/anxious.        OBJECTIVE:   /72   Pulse 78   Temp 98.8  F (37.1  C) (Tympanic)   Resp 16   Ht 1.73 m (5' 8.11\")   Wt 104.3 kg (230 lb)   SpO2 100%   BMI 34.86 kg/m   Estimated body mass index is 34.86 kg/m  as calculated from the following:    Height as of this encounter: 1.73 m (5' 8.11\").    Weight as of this encounter: 104.3 kg (230 lb).     Physical Exam  GENERAL: healthy, alert and no distress  EYES: Eyes grossly normal to inspection, " PERRL and conjunctivae and sclerae normal  HENT: ear canals and TM's normal, nose and mouth without ulcers or lesions  NECK: no adenopathy, no asymmetry, masses, or scars and thyroid normal to palpation  RESP: lungs clear to auscultation - no rales, rhonchi or wheezes  CV: regular rate and rhythm, normal S1 S2, no S3 or S4, no murmur, click or rub, no peripheral edema and peripheral pulses strong  ABDOMEN: soft, nontender, no hepatosplenomegaly, no masses and bowel sounds normal  MS: no gross musculoskeletal defects noted, no edema  SKIN: no suspicious lesions or rashes  NEURO: Normal strength and tone, mentation intact and speech normal  PSYCH: mentation appears normal, affect normal/bright    Diagnostic Test Results:  Labs reviewed in Epic    ASSESSMENT / PLAN:   (Z00.00) Encounter for Medicare annual wellness exam  (primary encounter diagnosis)  Comment: Stable health. See epic orders.     (E78.5) Hyperlipidemia LDL goal <130  Comment: Update lipids. Continue current meds.   Plan: simvastatin (ZOCOR) 20 MG tablet, Lipid panel         reflex to direct LDL Non-fasting,         **Comprehensive metabolic panel FUTURE 2mo,         OFFICE/OUTPT VISIT,EST,LEVL III          (E66.9,  Z68.34) Class 1 obesity with serious comorbidity and body mass index (BMI) of 34.0 to 34.9 in adult, unspecified obesity type  Comment: Offered referral to Comprehensive Weight Management.   Plan: Comprehensive Weight Management, OFFICE/OUTPT         VISIT,EST,LEVL III          (I10) Essential hypertension, benign  Comment: BP at target. Continue current meds.   Plan: amLODIPine (NORVASC) 2.5 MG tablet,         triamterene-HCTZ (MAXZIDE-25) 37.5-25 MG         tablet, OFFICE/OUTPT VISIT,EST,LEVL III          (M05.9) Rheumatoid arthritis with positive rheumatoid factor, involving unspecified site (H)  Comment: Continue to follow with rheumatology.     (C61) Prostate cancer (H)  Comment: Has seen Lashell Armstrong PA-C with Urology on  "12/17/2021--she advised f/u with PSA in two years.     (G47.33) MIGUEL (obstructive sleep apnea)  Comment: F/u with Dr Barnard of [facility] as planned.     (Z79.899) Medication management  Plan: **TSH with free T4 reflex FUTURE 2mo            Patient has been advised of split billing requirements and indicates understanding: Yes      COUNSELING:  Reviewed preventive health counseling, as reflected in patient instructions    Estimated body mass index is 34.86 kg/m  as calculated from the following:    Height as of this encounter: 1.73 m (5' 8.11\").    Weight as of this encounter: 104.3 kg (230 lb).    Weight management plan: Discussed healthy diet and exercise guidelines    He reports that he quit smoking about 50 years ago. His smoking use included cigarettes and pipe. He has a 10.00 pack-year smoking history. He has never used smokeless tobacco.      Appropriate preventive services were discussed with this patient, including applicable screening as appropriate for cardiovascular disease, diabetes, osteopenia/osteoporosis, and glaucoma.  As appropriate for age/gender, discussed screening for colorectal cancer, prostate cancer, breast cancer, and cervical cancer. Checklist reviewing preventive services available has been given to the patient.    Reviewed patients plan of care and provided an AVS. The Basic Care Plan (routine screening as documented in Health Maintenance) for Raul meets the Care Plan requirement. This Care Plan has been established and reviewed with the Patient.    Counseling Resources:  ATP IV Guidelines  Pooled Cohorts Equation Calculator  Breast Cancer Risk Calculator  Breast Cancer: Medication to Reduce Risk  FRAX Risk Assessment  ICSI Preventive Guidelines  Dietary Guidelines for Americans, 2010  [employer]'s MyPlate  ASA Prophylaxis  Lung CA Screening    Jose D Vasquez MD,   [facility]    Patient Instructions     Everything looks fine!    Refills of medications have been faxed to " your pharmacy.     Lab results will be available soon on [employer]    Someone will contact you to help you to schedule a Weight Management consult (if interested).      See me in a year, sooner if problems.               The patient was counseled and encouraged to consider modifying their diet and eating habits. He was provided with information on recommended healthy diet options.  The patient was provided with written information regarding signs of hearing loss.  Information on urinary incontinence and treatment options given to patient.

## 2022-10-26 NOTE — PATIENT INSTRUCTIONS
Patient Education   Personalized Prevention Plan  You are due for the preventive services outlined below.  Your care team is available to assist you in scheduling these services.  If you have already completed any of these items, please share that information with your care team to update in your medical record.  Health Maintenance Due   Topic Date Due    Hepatitis B Vaccine (1 of 3 - 3-dose series) Never done    COVID-19 Vaccine (5 - Booster for Moderna series) 06/30/2022    Flu Vaccine (1) 09/01/2022       Understanding USDA MyPlate  The USDA has guidelines to help you make healthy food choices. These are called MyPlate. MyPlate shows the food groups that make up healthy meals using the image of a place setting. Before you eat, think about the healthiest choices for what to put on your plate or in your cup or bowl. To learn more about building a healthy plate, visit www.[employer].gov.    The food groups  Fruits. Any fruit or 100% fruit juice counts as part of the Fruit Group. Fruits may be fresh, canned, frozen, or dried, and may be whole, cut-up, or pureed. Make 1/2 of your plate fruits and vegetables.  Vegetables. Any vegetable or 100% vegetable juice counts as a member of the Vegetable Group. Vegetables may be fresh, frozen, canned, or dried. They can be served raw or cooked and may be whole, cut-up, or mashed. Make 1/2 of your plate fruits and vegetables.  Grains. All foods made from grains are part of the Grains Group. These include wheat, rice, oats, cornmeal, and barley. Grains are often used to make foods such as bread, pasta, oatmeal, cereal, tortillas, and grits. Grains should be no more than 1/4 of your plate. At least half of your grains should be whole grains.  Protein. This group includes meat, poultry, seafood, beans and peas, eggs, processed soy products (such as tofu), nuts (including nut butters), and seeds. Make protein choices no more than 1/4 of your plate. Meat and poultry choices  should be lean or low fat.  Dairy. The Dairy Group includes all fluid milk products and foods made from milk that contain calcium, such as yogurt and cheese. (Foods that have little calcium, such as cream, butter, and cream cheese, are not part of this group.) Most dairy choices should be low-fat or fat-free.  Oils. Oils aren't a food group, but they do contain essential nutrients. However it's important to watch your intake of oils. These are fats that are liquid at room temperature. They include canola, corn, olive, soybean, vegetable, and sunflower oil. Foods that are mainly oil include mayonnaise, certain salad dressings, and soft margarines. You likely already get your daily oil allowance from the foods you eat.  Things to limit  Eating healthy also means limiting these things in your diet:     Salt (sodium). Many processed foods have a lot of sodium. To keep sodium intake down, eat fresh vegetables, meats, poultry, and seafood when possible. Purchase low-sodium, reduced-sodium, or no-salt-added food products at the store. And don't add salt to your meals at home. Instead, season them with herbs and spices such as dill, oregano, cumin, and paprika. Or try adding flavor with lemon or lime zest and juice.  Saturated fat. Saturated fats are most often found in animal products such as beef, pork, and chicken. They are often solid at room temperature, such as butter. To reduce your saturated fat intake, choose leaner cuts of meat and poultry. And try healthier cooking methods such as grilling, broiling, roasting, or baking. For a simple lower-fat swap, use plain nonfat yogurt instead of mayonnaise when making potato salad or macaroni salad.  Added sugars. These are sugars added to foods. They are in foods such as ice cream, candy, soda, fruit drinks, sports drinks, energy drinks, cookies, pastries, jams, and syrups. Cut down on added sugars by sharing sweet treats with a family member or friend. You can also choose  fruit for dessert, and drink water or other unsweetened beverages.     [employer]Well last reviewed this educational content on 6/1/2020 2000-2021 [employer]. All rights reserved. This information is not intended as a substitute for professional medical care. Always follow your healthcare professional's instructions.          Signs of Hearing Loss      Hearing much better with one ear can be a sign of hearing loss.   Hearing loss is a problem shared by many people. In fact, it is one of the most common health problems, particularly as people age. Most people age 65 and older have some hearing loss. By age 80, almost everyone does. Hearing loss often occurs slowly over the years. So you may not realize your hearing has gotten worse.  Have your hearing checked  Call your healthcare provider if you:  Have to strain to hear normal conversation  Have to watch other people s faces very carefully to follow what they re saying  Need to ask people to repeat what they ve said  Often misunderstand what people are saying  Turn the volume of the television or radio up so high that others complain  Feel that people are mumbling when they re talking to you  Find that the effort to hear leaves you feeling tired and irritated  Notice, when using the phone, that you hear better with one ear than the other  StayWell last reviewed this educational content on 1/1/2020 2000-2021 [employer]. All rights reserved. This information is not intended as a substitute for professional medical care. Always follow your healthcare professional's instructions.          Urinary Incontinence (Male)    Urinary incontinence means not being able to control the release of urine from the bladder.   Causes  Common causes of urinary incontinence in men include:  Infection  Certain medicines  Aging  Poor pelvic muscle tone  Bladder spasms  Obesity  Trouble urinating and fully emptying the bladder (urinary retention)  Other things  that can cause incontinence are:   Nervous system diseases  Diabetes  Sleep apnea  Urinary tract infections  Prostate surgery  Pelvic injury  Constipation and smoking have also been identified as risk factors.   Symptoms  Urge incontinence (overactive bladder). This is a sudden urge to urinate. It occurs even though there may not be much urine in the bladder. The need to urinate often during the night is common. It's due to bladder spasms.  Stress incontinence. This is urine leakage that you can't control. It can occur with sneezing, coughing, and other actions that put stress on the bladder.    Treatment  Treatment depends on what is causing the condition. Bladder infections are treated with antibiotics. Urinary retention is treated with a bladder catheter.   Home care  Follow these guidelines when caring for yourself at home:  Don't have any foods and drinks that may irritate the bladder. This includes:  Chocolate  Alcohol  Caffeine  Carbonated drinks  Acidic fruits and juices  Limit fluids to 6 to 8 cups a day.  Lose weight if you are overweight. This will reduce your symptoms.  If advised, do regular pelvic muscle-strengthening exercises such as Kegel exercises.  If needed, wear absorbent pads to catch urine. Change the pads often. This is for good hygiene and to prevent skin and bladder infections.  Bathe daily for good hygiene.  If an antibiotic was prescribed to treat a bladder infection, take it until it's finished. Keep taking it even if you are feeling better. This is to make sure your infection has cleared.  If a catheter was left in place, keep bacteria from getting into the collection bag. Don't disconnect the catheter from the collection bag.  Use a leg band to secure the catheter drainage tube, so it does not pull on the catheter. Drain the collection bag when it becomes full. To do this, use the drain spout at the bottom of the bag. Don't disconnect the bag from the catheter.  Don't pull on or try to  remove a catheter. The catheter must be removed by a healthcare provider.  If you smoke, stop. Ask your provider for help if you can't do this on your own.  Follow-up care  Follow up with your healthcare provider, or as advised.  When to get medical advice  Call your healthcare provider right away if any of these occur:  Fever over 100.4 F (38 C), or as directed by your provider  Bladder pain or fullness  Belly swelling, nausea, or vomiting  Back pain  Weakness, dizziness, or fainting  If a catheter was left in place, return if:  The catheter falls out  The catheter stops draining for 6 hours  Your urine gets cloudy or smells bad  [employer] last reviewed this educational content on 1/1/2020 2000-2021 [employer]. All rights reserved. This information is not intended as a substitute for professional medical care. Always follow your healthcare professional's instructions.        Everything looks fine!    Refills of medications have been faxed to your pharmacy.     Lab results will be available soon on [employer]    Someone will contact you to help you to schedule a Weight Management consult (if interested).      See me in a year, sooner if problems.

## 2022-10-27 LAB
ALBUMIN SERPL-MCNC: 3.7 G/DL (ref 3.4–5)
ALP SERPL-CCNC: 105 U/L (ref 40–150)
ALT SERPL W P-5'-P-CCNC: 25 U/L (ref 0–70)
ANION GAP SERPL CALCULATED.3IONS-SCNC: 8 MMOL/L (ref 3–14)
AST SERPL W P-5'-P-CCNC: 21 U/L (ref 0–45)
BILIRUB SERPL-MCNC: 0.6 MG/DL (ref 0.2–1.3)
BUN SERPL-MCNC: 24 MG/DL (ref 7–30)
CALCIUM SERPL-MCNC: 9.6 MG/DL (ref 8.5–10.1)
CHLORIDE BLD-SCNC: 109 MMOL/L (ref 94–109)
CHOLEST SERPL-MCNC: 157 MG/DL
CO2 SERPL-SCNC: 25 MMOL/L (ref 20–32)
CREAT SERPL-MCNC: 1.09 MG/DL (ref 0.66–1.25)
FASTING STATUS PATIENT QL REPORTED: NO
GFR SERPL CREATININE-BSD FRML MDRD: 67 ML/MIN/1.73M2
GLUCOSE BLD-MCNC: 104 MG/DL (ref 70–99)
HDLC SERPL-MCNC: 48 MG/DL
LDLC SERPL CALC-MCNC: 52 MG/DL
NONHDLC SERPL-MCNC: 109 MG/DL
POTASSIUM BLD-SCNC: 4.1 MMOL/L (ref 3.4–5.3)
PROT SERPL-MCNC: 6.9 G/DL (ref 6.8–8.8)
SODIUM SERPL-SCNC: 142 MMOL/L (ref 133–144)
TRIGL SERPL-MCNC: 287 MG/DL
TSH SERPL DL<=0.005 MIU/L-ACNC: 1.01 MU/L (ref 0.4–4)

## 2023-09-26 ENCOUNTER — PATIENT OUTREACH (OUTPATIENT)
Dept: CARE COORDINATION | Facility: CLINIC | Age: 88
End: 2023-09-26
Payer: COMMERCIAL

## 2023-09-28 DIAGNOSIS — C61 PROSTATE CANCER (H): Primary | ICD-10-CM

## 2023-10-10 ENCOUNTER — PATIENT OUTREACH (OUTPATIENT)
Dept: CARE COORDINATION | Facility: CLINIC | Age: 88
End: 2023-10-10
Payer: COMMERCIAL

## 2023-10-30 ENCOUNTER — LAB (OUTPATIENT)
Dept: LAB | Facility: CLINIC | Age: 88
End: 2023-10-30
Payer: COMMERCIAL

## 2023-10-30 ENCOUNTER — NURSE TRIAGE (OUTPATIENT)
Dept: NURSING | Facility: CLINIC | Age: 88
End: 2023-10-30

## 2023-10-30 DIAGNOSIS — C61 PROSTATE CANCER (H): ICD-10-CM

## 2023-10-30 PROCEDURE — 84153 ASSAY OF PSA TOTAL: CPT

## 2023-10-30 PROCEDURE — 36415 COLL VENOUS BLD VENIPUNCTURE: CPT

## 2023-10-30 NOTE — TELEPHONE ENCOUNTER
Nurse Triage SBAR    Is this a 2nd Level Triage? YES, LICENSED PRACTITIONER REVIEW IS REQUIRED    Situation: Cold symptoms with mild SOB.    Background: Patient states he began to have cold symptoms yesterday. States he woke up 4 times in the middle of the night shaking and cold. Denies any fever.     Assessment: This morning, he woke up coughing. Also reports runny nose and congestion. States he tested for COVID yesterday and it was negative. Reports feeling SOB with activity and mild SOB at rest. Used pulse oximeter. O2 98% and HR 46. He rechecked his pulse with the BP machine twice and it was 79 and 82. /90. Denies any chest pain, palpitations, weakness or dizziness/lightheadedness.     Protocol Recommended Disposition:   Go To Office Now    Recommendation: Recommendation is SLT. Will route high priority to PCP and care team for follow up with patient.  Gave care advice and call back instructions. Patient plans to follow advice.    Routed to provider    Does the patient meet one of the following criteria for ADS visit consideration? 16+ years old, with an [facility] PCP     TIP  Providers, please consider if this condition is appropriate for management at one of our Acute and Diagnostic Services sites.     If patient is a good candidate, please use dotphrase <dot>triageresponse and select Refer to ADS to document.        Reason for Disposition   MILD difficulty breathing (e.g., minimal/no SOB at rest, SOB with walking, pulse < 100) of new-onset or worse than normal    Additional Information   Negative: Breathing stopped and hasn't returned   Negative: SEVERE difficulty breathing (e.g., struggling for each breath, speaks in single words, pulse > 120)   Negative: Choking on something   Negative: Bluish (or gray) lips or face   Negative: Difficult to awaken or acting confused (e.g., disoriented, slurred speech)   Negative: Passed out (i.e., fainted, collapsed and was not responding)   Negative: Wheezing started  "suddenly after medicine, an allergic food, or bee sting   Negative: Stridor (harsh sound while breathing in)   Negative: Slow, shallow and weak breathing   Negative: Sounds like a life-threatening emergency to the triager   Negative: Chest pain   Negative: Wheezing (high pitched whistling sound) and previous asthma attacks or use of asthma medicines   Negative: Difficulty breathing and within 14 days of COVID-19 Exposure   Negative: Difficulty breathing and only present when coughing   Negative: Difficulty breathing and only from stuffy nose   Negative: Difficulty breathing and only from stuffy nose or runny nose from common cold   Negative: MODERATE difficulty breathing (e.g., speaks in phrases, SOB even at rest, pulse 100-120) of new-onset or worse than normal   Negative: Oxygen level (e.g., pulse oximetry) 90% or lower   Negative: Wheezing can be heard across the room   Negative: Drooling or spitting out saliva (because can't swallow)   Negative: Any history of prior \"blood clot\" in leg or lungs   Negative: Illness requiring prolonged bedrest in past month (e.g., immobilization, long hospital stay)   Negative: Hip or leg fracture (broken bone) in past month (or had cast on leg or ankle in past month)   Negative: Major surgery in the past month   Negative: Long-distance travel in past month (e.g., car, bus, train, plane; with trip lasting 6 or more hours)   Negative: Cancer treatment in past six months (or has cancer now)   Negative: Extra heartbeats, irregular heart beating, or heart is beating very fast (i.e., 'palpitations')   Negative: Fever > 103 F (39.4 C)   Negative: Fever > 101 F (38.3 C) and over 60 years of age   Negative: Fever > 100.0 F (37.8 C) and bedridden (e.g., nursing home patient, stroke, chronic illness, recovering from surgery)   Negative: Fever > 100.0 F (37.8 C) and diabetes mellitus or weak immune system (e.g., HIV positive, cancer chemo, splenectomy, organ transplant, chronic steroids)   " Negative: Periods where breathing stops and then resumes normally and bedridden (e.g., nursing home patient, CVA)   Negative: Pregnant or postpartum (from 0 to 6 weeks after delivery)   Negative: Patient sounds very sick or weak to the triager    Protocols used: Breathing Difficulty-A-[place]

## 2023-10-31 LAB — PSA SERPL DL<=0.01 NG/ML-MCNC: 0.43 NG/ML

## 2023-10-31 NOTE — TELEPHONE ENCOUNTER
Patient calls back for recommendations from Dr. Vasquez.     Informed him no response yet.     Patient states there is slight improvement overnight, throat is not quite as raw, but still difficult to swallow. Also has runny nose. Feels weak, like body is tired. No change in breathing, sob when he exerts himself. Able to walk to bathroom without difficulty or sob.   He took a dose of Robitussin at 1130 this morning. Unsure if helping yet. Let him know that message was routed again this morning.     KIZZY ALEXIS RN on 10/31/2023 at 1:06 PM   [facility]

## 2023-11-01 ENCOUNTER — OFFICE VISIT (OUTPATIENT)
Dept: URGENT CARE | Facility: URGENT CARE | Age: 88
End: 2023-11-01
Payer: COMMERCIAL

## 2023-11-01 VITALS
TEMPERATURE: 98.5 F | WEIGHT: 240 LBS | BODY MASS INDEX: 35.55 KG/M2 | SYSTOLIC BLOOD PRESSURE: 127 MMHG | HEIGHT: 69 IN | HEART RATE: 66 BPM | OXYGEN SATURATION: 98 % | DIASTOLIC BLOOD PRESSURE: 86 MMHG

## 2023-11-01 DIAGNOSIS — J02.9 ACUTE SORE THROAT: Primary | ICD-10-CM

## 2023-11-01 DIAGNOSIS — J06.9 VIRAL URI WITH COUGH: ICD-10-CM

## 2023-11-01 PROBLEM — E66.812 CLASS 2 SEVERE OBESITY DUE TO EXCESS CALORIES WITH SERIOUS COMORBIDITY IN ADULT (H): Status: ACTIVE | Noted: 2023-11-01

## 2023-11-01 PROBLEM — E66.01 CLASS 2 SEVERE OBESITY DUE TO EXCESS CALORIES WITH SERIOUS COMORBIDITY IN ADULT (H): Status: ACTIVE | Noted: 2023-11-01

## 2023-11-01 LAB
DEPRECATED S PYO AG THROAT QL EIA: NEGATIVE
FLUAV AG SPEC QL IA: NEGATIVE
FLUBV AG SPEC QL IA: NEGATIVE
GROUP A STREP BY PCR: NOT DETECTED

## 2023-11-01 PROCEDURE — 87804 INFLUENZA ASSAY W/OPTIC: CPT | Performed by: PHYSICIAN ASSISTANT

## 2023-11-01 PROCEDURE — 99214 OFFICE O/P EST MOD 30 MIN: CPT | Performed by: PHYSICIAN ASSISTANT

## 2023-11-01 PROCEDURE — 87651 STREP A DNA AMP PROBE: CPT | Performed by: PHYSICIAN ASSISTANT

## 2023-11-01 PROCEDURE — 87635 SARS-COV-2 COVID-19 AMP PRB: CPT | Performed by: PHYSICIAN ASSISTANT

## 2023-11-01 RX ORDER — BENZONATATE 200 MG/1
200 CAPSULE ORAL 3 TIMES DAILY PRN
Qty: 30 CAPSULE | Refills: 0 | Status: SHIPPED | OUTPATIENT
Start: 2023-11-01

## 2023-11-01 ASSESSMENT — ENCOUNTER SYMPTOMS
DIARRHEA: 0
RHINORRHEA: 1
SORE THROAT: 1
VOMITING: 0
COUGH: 1
FEVER: 0
CHILLS: 1

## 2023-11-01 NOTE — PROGRESS NOTES
Assessment & Plan:        ICD-10-CM    1. Acute sore throat  J02.9 Streptococcus A Rapid Screen w/Reflex to PCR - Clinic Collect     Group A Streptococcus PCR Throat Swab      2. Viral URI with cough  J06.9 Influenza A/B antigen     Symptomatic COVID-19 Virus (Coronavirus) by PCR Nose     benzonatate (TESSALON) 200 MG capsule            Plan/Clinical Decision Making:    Patient with acute ST with URI symptoms and cough.   Normal ear, throat and lung exam.   Negative flu and strep testing. Covid test pending.   Rest, fluids, Tylenol as needed. Can try Mucinex OTC and tessalon for cough.       Return if symptoms worsen or fail to improve, for in 5-7 days.     At the end of the encounter, I discussed results, diagnosis, medications. Discussed red flags for immediate return to clinic/ER, as well as indications for follow up if no improvement. Patient understood and agreed to plan. Patient was stable for discharge.        Giovana Garcia PA-C on 11/1/2023 at 10:40 AM          Subjective:     HPI:    Raul is a 88 year old male who presents to clinic today for the following health issues:  Chief Complaint   Patient presents with    Urgent Care     Cough, runny nose and weakness x 3 days. Pt took Robitussin yesterday with improvement of cough and worsening of runny nose. Neg COVID test 10/29/23.     HPI    Sunday developed cold symptoms. Woken up with bad cough, Having runny nose, sinus/head congestion and pressure. Having a bit of ST.   Having chills, no fever. Looser stool this morning.     Review of Systems   Constitutional:  Positive for chills. Negative for fever.   HENT:  Positive for congestion, rhinorrhea and sore throat.    Respiratory:  Positive for cough.    Gastrointestinal:  Negative for diarrhea and vomiting.         Patient Active Problem List   Diagnosis    Essential hypertension, benign    Chronic rhinitis    Prostate cancer (H)    HYPERLIPIDEMIA LDL GOAL <130    Advanced directives,  "counseling/discussion    RA (rheumatoid arthritis) (H)    History of colonic polyps    MIGUEL (obstructive sleep apnea)    Class 2 severe obesity due to excess calories with serious comorbidity in adult (H)        Past Medical History:   Diagnosis Date    Arthritis     HTN     Malignant neoplasm (H) 2011    Prostate cancer    MI, old     vs. asthma per pt.'s wife    Migraine, unspecified, without mention of intractable migraine without mention of status migrainosus     Abstracted 5/15/02.    MIGUEL (obstructive sleep apnea)     Other and unspecified hyperlipidemia     Abstracted 5/15/02.    Other and unspecified hyperlipidemia     Sleep apnea        Social History     Tobacco Use    Smoking status: Former     Packs/day: 1.00     Years: 10.00     Additional pack years: 0.00     Total pack years: 10.00     Types: Cigarettes, Pipe     Quit date: 1972     Years since quittin.8    Smokeless tobacco: Never   Substance Use Topics    Alcohol use: Yes     Comment: Infrequent wine/beer             Objective:     Vitals:    23 1017   BP: 127/86   Pulse: 66   Temp: 98.5  F (36.9  C)   TempSrc: Oral   SpO2: 98%   Weight: 108.9 kg (240 lb)   Height: 1.753 m (5' 9\")         Physical Exam   EXAM:   Pleasant, alert, appropriate appearance. NAD.  Head Exam: Normocephalic, atraumatic.  Eye Exam: non icteric/injection.    Ear Exam: TMs grey without bulging. Normal canals.  Normal pinna.  Nose Exam: Normal external nose.    OroPharynx Exam:  Moist mucous membranes. Mild erythema, pharynx without exudate or hypertrophy.  Neck/Thyroid Exam:  No LAD.    Chest/Respiratory Exam: CTAB.  Cardiovascular Exam: RRR. No murmur or rubs.        Results:  Results for orders placed or performed in visit on 23   Influenza A/B antigen     Status: Normal    Specimen: Nose; Swab   Result Value Ref Range    Influenza A antigen Negative Negative    Influenza B antigen Negative Negative    Narrative    Test results must be correlated with " clinical data. If necessary, results should be confirmed by a molecular assay or viral culture.   Streptococcus A Rapid Screen w/Reflex to PCR - Clinic Collect     Status: Normal    Specimen: Throat; Swab   Result Value Ref Range    Group A Strep antigen Negative Negative

## 2023-11-01 NOTE — TELEPHONE ENCOUNTER
Patient tested negative for Covid at home.  He is still feeling poorly, offered a same day appt here in clinic but patient declines stating he would prefer to just go to  in [place]. BLADIMIR Tapia R.N.

## 2023-11-01 NOTE — TELEPHONE ENCOUNTER
Check back with patient:    Has he tested at home for Covid?  If not feeling better, likely should see urgent care or same day care.

## 2023-11-02 LAB — SARS-COV-2 RNA RESP QL NAA+PROBE: POSITIVE

## 2023-11-03 ENCOUNTER — VIRTUAL VISIT (OUTPATIENT)
Dept: INTERNAL MEDICINE | Facility: CLINIC | Age: 88
End: 2023-11-03
Payer: COMMERCIAL

## 2023-11-03 DIAGNOSIS — E66.01 MORBID OBESITY (H): ICD-10-CM

## 2023-11-03 DIAGNOSIS — E78.5 HYPERLIPIDEMIA LDL GOAL <130: ICD-10-CM

## 2023-11-03 DIAGNOSIS — U07.1 INFECTION DUE TO 2019 NOVEL CORONAVIRUS: Primary | ICD-10-CM

## 2023-11-03 DIAGNOSIS — M05.9 RHEUMATOID ARTHRITIS WITH POSITIVE RHEUMATOID FACTOR, INVOLVING UNSPECIFIED SITE (H): ICD-10-CM

## 2023-11-03 DIAGNOSIS — C61 PROSTATE CANCER (H): ICD-10-CM

## 2023-11-03 PROCEDURE — 99214 OFFICE O/P EST MOD 30 MIN: CPT | Mod: VID | Performed by: INTERNAL MEDICINE

## 2023-11-03 RX ORDER — GUAIFENESIN AND DEXTROMETHORPHAN HYDROBROMIDE 600; 30 MG/1; MG/1
2 TABLET, EXTENDED RELEASE ORAL EVERY 12 HOURS
COMMUNITY

## 2023-11-03 RX ORDER — SIMVASTATIN 20 MG
20 TABLET ORAL AT BEDTIME
Qty: 30 TABLET | Refills: 0
Start: 2023-11-03 | End: 2024-03-22

## 2023-11-03 NOTE — PROGRESS NOTES
"Face to face time with patient: 22 minutes ([id number]---[phone])     Raul is a 88 year old who is being evaluated via a billable video visit.      How would you like to obtain your AVS? MyChart  If the video visit is dropped, the invitation should be resent by: Text to cell phone: [phone]  Will anyone else be joining your video visit? Yes: Wife - Ester. How would they like to receive their invitation? Text to cell phone: [phone]          Assessment & Plan   (U07.1) Infection due to 2019 novel coronavirus  (primary encounter diagnosis)  Comment: Reviewed current meds and adjusted them for Paxlovid therapy.   Plan: nirmatrelvir and ritonavir (PAXLOVID) 300         mg/100 mg therapy pack          (M05.9) Rheumatoid arthritis with positive rheumatoid factor, involving unspecified site (H)  Comment: Hold next dose of methotrexate.     (C61) Prostate cancer (H)  Comment: Follow up with Urology.     (E78.5) Hyperlipidemia LDL goal <130  Comment: Refilled simvastatin.   Plan: simvastatin (ZOCOR) 20 MG tablet             BMI:   Estimated body mass index is 35.44 kg/m  as calculated from the following:    Height as of 11/1/23: 1.753 m (5' 9\").    Weight as of 11/1/23: 108.9 kg (240 lb).   Weight management plan: Discussed healthy diet and exercise guidelines    Follow up with Dr Finn as scheduled.     Jose D Vasquez MD,   [facility]    Subjective   Raul is a 88 year old, presenting for the following health issues:  RECHECK ([facility] follow up.)    [facility]     ED/UC Followup:    Facility:  [facility]  Date of visit: 11/1/23  Reason for visit: Acute sore throat, viral URI with cough, covid 19  Current Status: He is slightly feeling better and he is seeking treatment for his covid    The patient first recalls feeling ill last Sunday, with \"head cold\" symptoms. Home test for Covid negative at that time.   He had taken his wife to the ED on Saturday.     By " Wednesday 11/1 he presented to Urgent Care. A rapid Covid test was negative, however, a PCR test was obtained. He learned yesterday that this had returned positive.     No ongoing fevers. He notes a cough, sore throat and myalgias. Initial fatigue now somewhat improved. No ear ache or sinus pain. No dyspnea.     Past medical, family and social histories as well as medications reviewed and updated as needed.    Review of Systems   REVIEW OF SYSTEMS: The following systems have been completely reviewed and are negative except as noted above:   Constitutional, HEENT, respiratory, gastrointestinal, musculoskeletal, dermatologic, and neurologic systems.        Objective           Vitals:  No vitals were obtained today due to virtual visit.    Physical Exam   GENERAL: alert, no distress, and fatigued  EYES: Eyes grossly normal to inspection.  No discharge or erythema, or obvious scleral/conjunctival abnormalities.  RESP: No audible wheeze, cough, or visible cyanosis.  No visible retractions or increased work of breathing.    SKIN: Visible skin clear. No significant rash, abnormal pigmentation or lesions.  NEURO: Cranial nerves grossly intact.  Mentation and speech appropriate for age.  PSYCH: Mentation appears normal, affect normal/bright, judgement and insight intact, normal speech and appearance well-groomed.           Video-Visit Details    Type of service:  Video Visit   Video Start Time: 1647  Video End Time: 1709    Originating Location (pt. Location): Home    Distant Location (provider location):  On-site  Platform used for Video Visit: Verbling

## 2023-11-07 ENCOUNTER — VIRTUAL VISIT (OUTPATIENT)
Dept: UROLOGY | Facility: CLINIC | Age: 88
End: 2023-11-07
Payer: COMMERCIAL

## 2023-11-07 VITALS — BODY MASS INDEX: 34.07 KG/M2 | HEIGHT: 69 IN | WEIGHT: 230 LBS

## 2023-11-07 DIAGNOSIS — Z12.5 SCREENING PSA (PROSTATE SPECIFIC ANTIGEN): Primary | ICD-10-CM

## 2023-11-07 PROCEDURE — 99214 OFFICE O/P EST MOD 30 MIN: CPT | Mod: VID | Performed by: NURSE PRACTITIONER

## 2023-11-07 ASSESSMENT — PAIN SCALES - GENERAL: PAINLEVEL: NO PAIN (0)

## 2023-11-07 NOTE — NURSING NOTE
Is the patient currently in the state of [place]? YES    Visit mode:VIDEO    If the visit is dropped, the patient can be reconnected by: VIDEO VISIT: Send to e-mail at: [email or web address]    Will anyone else be joining the visit? NO  (If patient encounters technical issues they should call [phone]    How would you like to obtain your AVS? MyChart    Are changes needed to the allergy or medication list? No    Pt tested positive for Covid 11/2/23.    Reason for visit: BIBI MORAN

## 2023-11-07 NOTE — LETTER
11/7/2023       RE: Raul Younger  [address]  [place] [address]     Dear Colleague,    Thank you for referring your patient, Raul Younger, to the [facility] [place] at [facility]. Please see a copy of my visit note below.    Consult conducted via real-time audio/video technology by CHELY Izaguirre CNP from the Physician's Building at [facility] to the patient in their home. Patient consented to this billed visit that was started at 1:30 and completed at 1:48.      Urology Virtual Visit    Name: Raul Younger    MRN: [id number]   YOB: 1935                 Chief Complaint:   Urinary urgency & frequency, incontinence         Impression and Plan:   Impression / Plan:   Raul Younger is a 88 year old male with hx prostate Ca and symptomology consistent with OAB.       -The patients PSA remained stable at 0.43.   -follow-up PSA in 2 years    -Discussed the role of bladder irritants. Recommended avoiding carbonated beverages, fruit juices. He is aware coffee is a bladder irritant.     -Will initiate oxybutynin. Potential side effects including constipation, drowsiness retention & dry mouth discussed. Discussed with patient this medication increases bladder capacity, decreases uninhibited contractions, and delays desire to void, therefore, decreases urgency and frequency. Discussed this medication typically takes 8 weeks to reach max effect.  -Continue bowel regimen w docusate. Instructed to add Miralax half capful daily if he develops constipation with this medicine.     -Follow up in 3 months for PVR and symptom recheck     Thank you for the opportunity to participate in the care of Raul Younger.     CHELY Nelson, CNP   Physicians - Department of Urology  [phone]          History of Present Illness:   Raul Younger is a 88 year old male with follow-up regarding  prostate cancer. He was originally diagnosed with [place] 4+4 = 8 adenocarcinoma the prostate. He was treated with cryoablation of the prostate in 2010 by Dr. Nicolas. He tells me he was left with only 15% on his prostate after the procedure. His PSA was undetectable until July 2016. Most recently in 2021, patient's PSA was 0.56. Last PVR 60 mL (12/17/21).    He complains today of 3 months of ongoing occasional urge incontinence. Has been having chronic urgency and frequency during the daytime. He reports he goes to the bathroom on average every 45 minutes to an hour. He drinks about 32 oz coffee and a diet coke daily. Experiences nocturia 1x/night. He previously has been on Flomax and finasteride did not feel that it helped his urinary symptoms.     History is obtained from patient & EMR          Past Medical History:     Past Medical History:   Diagnosis Date    Arthritis     HTN     Malignant neoplasm (H) 01/2011    Prostate cancer    MI, old     vs. asthma per pt.'s wife    Migraine, unspecified, without mention of intractable migraine without mention of status migrainosus     Abstracted 5/15/02.    MIGUEL (obstructive sleep apnea)     Other and unspecified hyperlipidemia     Abstracted 5/15/02.    Other and unspecified hyperlipidemia     Sleep apnea             Past Surgical History:     Past Surgical History:   Procedure Laterality Date    ABDOMEN SURGERY  1980    diaphram repair    COLONOSCOPY  12/29/2011    Diverticuli, 3 mm tissue biopsied, path showed no hyperplastic or adenomatous elements    CYSTOSCOPY      ENT SURGERY      GENITOURINARY SURGERY      HEAD & NECK SURGERY      HERNIORRHAPHY UMBILICAL N/A 9/21/2017    Procedure: HERNIORRHAPHY UMBILICAL;  open incarcerated ventral hernia repair with mesh;  Surgeon: Cristina Robbins MD;  Location: [facility] OR    IRRIGATION AND DEBRIDEMENT HIP, COMBINED Left 11/7/2015    Procedure: COMBINED IRRIGATION AND DEBRIDEMENT HIP;  Surgeon: Stanislav Maharaj MD;   Location: [facility] OR    left pelvis hip repaired (after 8 foot fall)      ORTHOPEDIC SURGERY      PHACOEMULSIFICATION CLEAR CORNEA WITH TORIC INTRAOCULAR LENS IMPLANT  2012    Procedure:PHACOEMULSIFICATION CLEAR CORNEA WITH TORIC INTRAOCULAR LENS IMPLANT; RIGHT PHACOEMULSIFICATION CLEAR CORNEA WITH  DELUXE TORIC INTRAOCULAR LENS IMPLANT; Surgeon:ANDRA LAGUNAS; Location:[facility]    PHACOEMULSIFICATION CLEAR CORNEA WITH TORIC INTRAOCULAR LENS IMPLANT  2012    Procedure:PHACOEMULSIFICATION CLEAR CORNEA WITH TORIC INTRAOCULAR LENS IMPLANT; LEFT PHACOEMULSIFICATION CLEAR CORNEA WITH TORIC INTRAOCULAR LENS IMPLANT ; Surgeon:ANDRA LAGUNAS; Location:[facility]    PROSTATE SURGERY  2010    adwoa placed for left arm fracture      VITRECTOMY PARSPLANA WITH 23 GAUGE SYSTEM  2013    Procedure: VITRECTOMY PARSPLANA WITH 23 GAUGE SYSTEM;  LEFT 23 GAUGE VITRECTOMY, EPIRETINAL MEMBRANE REMOVAL, AIR FLUID EXCHANGE ;  Surgeon: Lawson Celeste MD;  Location: [facility]    VITRECTOMY PARSPLANA WITH 23 GAUGE SYSTEM  2014    Procedure: VITRECTOMY PARSPLANA WITH 23 GAUGE SYSTEM;  RIGHT VITRECTOMY PARSPLANA WITH 23 GAUGE SYSTEM, EPIRETINAL MEMBRANE REMOVAL, AIR/FLUID EXCHANGE ;  Surgeon: Lawson Celeste MD;  Location: [facility] NONSPECIFIC PROCEDURE      Paraesophageal Hernia Repair. Abstracted 5/15/02.    [facility] NONSPECIFIC PROCEDURE      Tonsillectomy. Abstracted 5/15/02.            Social History:     Social History     Tobacco Use    Smoking status: Former     Packs/day: 1.00     Years: 10.00     Additional pack years: 0.00     Total pack years: 10.00     Types: Cigarettes, Pipe     Quit date: 1972     Years since quittin.8    Smokeless tobacco: Never   Substance Use Topics    Alcohol use: Yes     Comment: Infrequent wine/beer            Family History:     Family History   Problem Relation Age of Onset    Family History Negative Mother          age 99 after hip fx, was almost 100    Hypertension Mother      Hyperlipidemia Mother     Neurologic Disorder Father          age 70, ALS, laryngeal CA    C.A.D. Father     Heart Disease Father     Hyperlipidemia Father     Hypertension Father     Heart Disease Sister         Born 1931    Coronary Artery Disease Sister     Hypertension Sister     Arthritis Sister         Born 1933    Vision Loss Sister     Hypertension Daughter             Allergies:     Allergies   Allergen Reactions    Latex      Area turns red and peels off    Nifedipine      Low grade headache    Procardia [Nifedipine] Other (See Comments)     headache            Medications:     Current Outpatient Medications   Medication Sig    acetaminophen (TYLENOL) 325 MG tablet Take 2 tablets (650 mg) by mouth every 4 hours as needed for other (mild pain)    amLODIPine (NORVASC) 2.5 MG tablet Take 2 tablets (5 mg) by mouth daily    aspirin 81 MG tablet Take 1 tablet by mouth daily.    benzonatate (TESSALON) 200 MG capsule Take 1 capsule (200 mg) by mouth 3 times daily as needed for cough    dextromethorphan-guaiFENesin (MUCINEX DM)  MG 12 hr tablet Take 2 tablets by mouth every 12 hours    docusate calcium (SURFAK) 240 MG capsule Take 240 mg by mouth daily as needed     fexofenadine (ALLEGRA) 60 MG tablet Take 60 mg by mouth 2 times daily    folic acid (FOLVITE) 1 MG tablet Take 1 mg by mouth daily    InFLIXimab (REMICADE IV) Infusion every 7 weeks    methotrexate 2.5 MG tablet Take 25 mg by mouth once a week On . Hold on Tuesday, 2023. Resume taking on 2023.    Multiple Vitamins-Minerals (PRESERVISION AREDS PO) Take 1 tablet by mouth every morning     naproxen sodium (ANAPROX) 220 MG tablet Take by mouth as needed for moderate pain Reported on 3/22/2017    nirmatrelvir and ritonavir (PAXLOVID) 300 mg/100 mg therapy pack Take 3 tablets by mouth 2 times daily for 5 days Hold simvastatin while taking Paxlovid, then resume as usual. Hold methotrexate on , then resume usual  schedule.    NONFORMULARY 1 capsule 2 times daily Prostate and Urinary Health by [employer]    omega 3 1000 MG CAPS Take 1,000 mg by mouth daily    simvastatin (ZOCOR) 20 MG tablet Take 1 tablet (20 mg) by mouth at bedtime Hold while taking Paxlovid, then resume as previous.    triamterene-HCTZ (MAXZIDE-25) 37.5-25 MG tablet Take 1 tablet by mouth daily    Vitamin D3 (CHOLECALCIFEROL) 125 MCG (5000 UT) tablet Take 1 tablet by mouth daily     No current facility-administered medications for this visit.             Review of Systems:    ROS: See HPI for pertinent details.  Remainder of 10-point ROS negative.         Physical Exam:   VS:  T: Data Unavailable    HR: Data Unavailable    BP: [Unable to obtain from patient.[    RR: Data Unavailable     GEN:  Alert.  NAD.    HEENT:  Sclerae anicteric.    CV:  No obvious jugular venous distension  LUNGS: No respiratory distress, breathing comfortably wo accessory muscle use.  SKIN:  No visible rash  NEURO:  CN grossly intact.             Data:   All laboratory data reviewed:    Lab Results   Component Value Date    PSA 0.43 10/30/2023    PSA 0.56 11/05/2021    PSA 0.56 11/07/2019    PSA 0.52 10/18/2018    PSA 0.56 05/31/2018    PSA 0.57 04/19/2018    PSA 0.56 01/19/2018     Lab Results   Component Value Date    CR 1.09 10/26/2022    CR 1.11 08/12/2021    CR 1.12 08/17/2020    CR 1.04 07/05/2019    CR 0.88 10/08/2018    CR 1.13 05/31/2018    CR 0.88 09/16/2017     Lab Results   Component Value Date    GFRESTIMATED 67 10/26/2022    GFRESTIMATED 61 08/12/2021    GFRESTIMATED 61 08/17/2020    GFRESTIMATED 67 07/05/2019    GFRESTIMATED 83 10/08/2018    GFRESTIMATED 62 05/31/2018    GFRESTIMATED 84 09/16/2017     Color Urine (no units)   Date Value   12/17/2021 Yellow   09/16/2017 Straw     Appearance Urine (no units)   Date Value   12/17/2021 Clear   09/16/2017 Clear     Glucose Urine (mg/dL)   Date Value   12/17/2021 Negative   09/16/2017 Negative     Bilirubin Urine (no units)    Date Value   12/17/2021 Negative   09/16/2017 Negative     Ketones Urine (mg/dL)   Date Value   12/17/2021 Negative   09/16/2017 Negative     Specific Gravity Urine (no units)   Date Value   12/17/2021 1.015   09/16/2017 1.014     pH Urine   Date Value   12/17/2021 7.0   09/16/2017 6.0 pH     Protein Albumin Urine (mg/dL)   Date Value   12/17/2021 Negative   09/16/2017 Negative     Urobilinogen Urine   Date Value   12/17/2021 0.2 E.U./dL   12/08/2016 1.0 EU/dL     Nitrite Urine (no units)   Date Value   12/17/2021 Negative   09/16/2017 Negative     Leukocyte Esterase Urine (no units)   Date Value   12/17/2021 Negative   09/16/2017 Negative

## 2023-11-07 NOTE — PROGRESS NOTES
Consult conducted via real-time audio/video technology by CHELY Izaguirre CNP from the Physician's Building at [facility] to the patient in their home. Patient consented to this billed visit that was started at 1:30 and completed at 1:48.      Urology Virtual Visit    Name: Raul Younger    MRN: [id number]   YOB: 1935                 Chief Complaint:   Urinary urgency & frequency, incontinence         Impression and Plan:   Impression / Plan:   Raul Younger is a 88 year old male with hx prostate Ca and symptomology consistent with OAB.       -The patients PSA remained stable at 0.43.   -follow-up PSA in 2 years    -Discussed the role of bladder irritants. Recommended avoiding carbonated beverages, fruit juices. He is aware coffee is a bladder irritant.     -Will initiate oxybutynin. Potential side effects including constipation, drowsiness retention & dry mouth discussed. Discussed with patient this medication increases bladder capacity, decreases uninhibited contractions, and delays desire to void, therefore, decreases urgency and frequency. Discussed this medication typically takes 8 weeks to reach max effect.  -Continue bowel regimen w docusate. Instructed to add Miralax half capful daily if he develops constipation with this medicine.     -Follow up in 3 months for PVR and symptom recheck     Thank you for the opportunity to participate in the care of Raul Younger.     CHELY Nelson, CNP   Physicians - Department of Urology  [phone]          History of Present Illness:   Raul Younger is a 88 year old male with follow-up regarding prostate cancer. 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 He was treated with cryoablation of the prostate in 2010 by Dr. Nicolas. He tells me he was left with only 15% on his prostate after the procedure. His PSA was undetectable until July 2016. Most recently in 2021, patient's PSA  was 0.56. Last PVR 60 mL (12/17/21).    He complains today of 3 months of ongoing occasional urge incontinence. Has been having chronic urgency and frequency during the daytime. He reports he goes to the bathroom on average every 45 minutes to an hour. He drinks about 32 oz coffee and a diet coke daily. Experiences nocturia 1x/night. He previously has been on Flomax and finasteride did not feel that it helped his urinary symptoms.     History is obtained from patient & EMR          Past Medical History:     Past Medical History:   Diagnosis Date    Arthritis     HTN     Malignant neoplasm (H) 01/2011    Prostate cancer    MI, old     vs. asthma per pt.'s wife    Migraine, unspecified, without mention of intractable migraine without mention of status migrainosus     Abstracted 5/15/02.    MIGUEL (obstructive sleep apnea)     Other and unspecified hyperlipidemia     Abstracted 5/15/02.    Other and unspecified hyperlipidemia     Sleep apnea             Past Surgical History:     Past Surgical History:   Procedure Laterality Date    ABDOMEN SURGERY  1980    diaphram repair    COLONOSCOPY  12/29/2011    Diverticuli, 3 mm tissue biopsied, path showed no hyperplastic or adenomatous elements    CYSTOSCOPY      ENT SURGERY      GENITOURINARY SURGERY      HEAD & NECK SURGERY      HERNIORRHAPHY UMBILICAL N/A 9/21/2017    Procedure: HERNIORRHAPHY UMBILICAL;  open incarcerated ventral hernia repair with mesh;  Surgeon: Cristina Robbins MD;  Location: [facility] OR    IRRIGATION AND DEBRIDEMENT HIP, COMBINED Left 11/7/2015    Procedure: COMBINED IRRIGATION AND DEBRIDEMENT HIP;  Surgeon: Stanislav Maharaj MD;  Location: [facility] OR    left pelvis hip repaired (after 8 foot fall)      ORTHOPEDIC SURGERY      PHACOEMULSIFICATION CLEAR CORNEA WITH TORIC INTRAOCULAR LENS IMPLANT  5/23/2012    Procedure:PHACOEMULSIFICATION CLEAR CORNEA WITH TORIC INTRAOCULAR LENS IMPLANT; RIGHT PHACOEMULSIFICATION CLEAR CORNEA WITH  DELUXE TORIC INTRAOCULAR  LENS IMPLANT; Surgeon:ANDRA LAGUNAS; Location:[facility]    PHACOEMULSIFICATION CLEAR CORNEA WITH TORIC INTRAOCULAR LENS IMPLANT  2012    Procedure:PHACOEMULSIFICATION CLEAR CORNEA WITH TORIC INTRAOCULAR LENS IMPLANT; LEFT PHACOEMULSIFICATION CLEAR CORNEA WITH TORIC INTRAOCULAR LENS IMPLANT ; Surgeon:ANDRA LAGUNAS; Location:[facility]    PROSTATE SURGERY  2010    adwoa placed for left arm fracture      VITRECTOMY PARSPLANA WITH 23 GAUGE SYSTEM  2013    Procedure: VITRECTOMY PARSPLANA WITH 23 GAUGE SYSTEM;  LEFT 23 GAUGE VITRECTOMY, EPIRETINAL MEMBRANE REMOVAL, AIR FLUID EXCHANGE ;  Surgeon: Lawson Celeste MD;  Location: [facility]    VITRECTOMY PARSPLANA WITH 23 GAUGE SYSTEM  2014    Procedure: VITRECTOMY PARSPLANA WITH 23 GAUGE SYSTEM;  RIGHT VITRECTOMY PARSPLANA WITH 23 GAUGE SYSTEM, EPIRETINAL MEMBRANE REMOVAL, AIR/FLUID EXCHANGE ;  Surgeon: Lawson Celeste MD;  Location: [facility] NONSPECIFIC PROCEDURE      Paraesophageal Hernia Repair. Abstracted 5/15/02.    [facility] NONSPECIFIC PROCEDURE      Tonsillectomy. Abstracted 5/15/02.            Social History:     Social History     Tobacco Use    Smoking status: Former     Packs/day: 1.00     Years: 10.00     Additional pack years: 0.00     Total pack years: 10.00     Types: Cigarettes, Pipe     Quit date: 1972     Years since quittin.8    Smokeless tobacco: Never   Substance Use Topics    Alcohol use: Yes     Comment: Infrequent wine/beer            Family History:     Family History   Problem Relation Age of Onset    Family History Negative Mother          age 99 after hip fx, was almost 100    Hypertension Mother     Hyperlipidemia Mother     Neurologic Disorder Father          age 70, ALS, laryngeal CA    C.A.D. Father     Heart Disease Father     Hyperlipidemia Father     Hypertension Father     Heart Disease Sister         Born 193    Coronary Artery Disease Sister     Hypertension Sister     Arthritis Sister         Born 1933     Vision Loss Sister     Hypertension Daughter             Allergies:     Allergies   Allergen Reactions    Latex      Area turns red and peels off    Nifedipine      Low grade headache    Procardia [Nifedipine] Other (See Comments)     headache            Medications:     Current Outpatient Medications   Medication Sig    acetaminophen (TYLENOL) 325 MG tablet Take 2 tablets (650 mg) by mouth every 4 hours as needed for other (mild pain)    amLODIPine (NORVASC) 2.5 MG tablet Take 2 tablets (5 mg) by mouth daily    aspirin 81 MG tablet Take 1 tablet by mouth daily.    benzonatate (TESSALON) 200 MG capsule Take 1 capsule (200 mg) by mouth 3 times daily as needed for cough    dextromethorphan-guaiFENesin (MUCINEX DM)  MG 12 hr tablet Take 2 tablets by mouth every 12 hours    docusate calcium (SURFAK) 240 MG capsule Take 240 mg by mouth daily as needed     fexofenadine (ALLEGRA) 60 MG tablet Take 60 mg by mouth 2 times daily    folic acid (FOLVITE) 1 MG tablet Take 1 mg by mouth daily    InFLIXimab (REMICADE IV) Infusion every 7 weeks    methotrexate 2.5 MG tablet Take 25 mg by mouth once a week On Tuesdays. Hold on Tuesday, 11/7/2023. Resume taking on 11/14/2023.    Multiple Vitamins-Minerals (PRESERVISION AREDS PO) Take 1 tablet by mouth every morning     naproxen sodium (ANAPROX) 220 MG tablet Take by mouth as needed for moderate pain Reported on 3/22/2017    nirmatrelvir and ritonavir (PAXLOVID) 300 mg/100 mg therapy pack Take 3 tablets by mouth 2 times daily for 5 days Hold simvastatin while taking Paxlovid, then resume as usual. Hold methotrexate on Tuesday, 11/7, then resume usual schedule.    NONFORMULARY 1 capsule 2 times daily Prostate and Urinary [facility] by [employer]    omega 3 1000 MG CAPS Take 1,000 mg by mouth daily    simvastatin (ZOCOR) 20 MG tablet Take 1 tablet (20 mg) by mouth at bedtime Hold while taking Paxlovid, then resume as previous.    triamterene-HCTZ (MAXZIDE-25) 37.5-25 MG tablet Take 1  tablet by mouth daily    Vitamin D3 (CHOLECALCIFEROL) 125 MCG (5000 UT) tablet Take 1 tablet by mouth daily     No current facility-administered medications for this visit.             Review of Systems:    ROS: See HPI for pertinent details.  Remainder of 10-point ROS negative.         Physical Exam:   VS:  T: Data Unavailable    HR: Data Unavailable    BP: [Unable to obtain from patient.[    RR: Data Unavailable     GEN:  Alert.  NAD.    HEENT:  Sclerae anicteric.    CV:  No obvious jugular venous distension  LUNGS: No respiratory distress, breathing comfortably wo accessory muscle use.  SKIN:  No visible rash  NEURO:  CN grossly intact.             Data:   All laboratory data reviewed:    Lab Results   Component Value Date    PSA 0.43 10/30/2023    PSA 0.56 11/05/2021    PSA 0.56 11/07/2019    PSA 0.52 10/18/2018    PSA 0.56 05/31/2018    PSA 0.57 04/19/2018    PSA 0.56 01/19/2018     Lab Results   Component Value Date    CR 1.09 10/26/2022    CR 1.11 08/12/2021    CR 1.12 08/17/2020    CR 1.04 07/05/2019    CR 0.88 10/08/2018    CR 1.13 05/31/2018    CR 0.88 09/16/2017     Lab Results   Component Value Date    GFRESTIMATED 67 10/26/2022    GFRESTIMATED 61 08/12/2021    GFRESTIMATED 61 08/17/2020    GFRESTIMATED 67 07/05/2019    GFRESTIMATED 83 10/08/2018    GFRESTIMATED 62 05/31/2018    GFRESTIMATED 84 09/16/2017     Color Urine (no units)   Date Value   12/17/2021 Yellow   09/16/2017 Straw     Appearance Urine (no units)   Date Value   12/17/2021 Clear   09/16/2017 Clear     Glucose Urine (mg/dL)   Date Value   12/17/2021 Negative   09/16/2017 Negative     Bilirubin Urine (no units)   Date Value   12/17/2021 Negative   09/16/2017 Negative     Ketones Urine (mg/dL)   Date Value   12/17/2021 Negative   09/16/2017 Negative     Specific Gravity Urine (no units)   Date Value   12/17/2021 1.015   09/16/2017 1.014     pH Urine   Date Value   12/17/2021 7.0   09/16/2017 6.0 pH     Protein Albumin Urine (mg/dL)    Date Value   12/17/2021 Negative   09/16/2017 Negative     Urobilinogen Urine   Date Value   12/17/2021 0.2 E.U./dL   12/08/2016 1.0 EU/dL     Nitrite Urine (no units)   Date Value   12/17/2021 Negative   09/16/2017 Negative     Leukocyte Esterase Urine (no units)   Date Value   12/17/2021 Negative   09/16/2017 Negative

## 2023-11-09 ENCOUNTER — TELEPHONE (OUTPATIENT)
Dept: UROLOGY | Facility: CLINIC | Age: 88
End: 2023-11-09
Payer: COMMERCIAL

## 2023-11-09 DIAGNOSIS — N32.81 OAB (OVERACTIVE BLADDER): Primary | ICD-10-CM

## 2023-11-09 RX ORDER — OXYBUTYNIN CHLORIDE 5 MG/1
5 TABLET ORAL 3 TIMES DAILY
Qty: 90 TABLET | Refills: 1 | Status: SHIPPED | OUTPATIENT
Start: 2023-11-09

## 2023-11-09 NOTE — TELEPHONE ENCOUNTER
[facility] Call Center    Phone Message    May a detailed message be left on voicemail: yes     Reason for Call: Other: Patient was told at his recent VV apt that medication Oxybutynin will be sent to [id number] - [place], [place] - [address] [facility] [address] patient went to pharmacy and they state no medication was sent     Action Taken: Message routed to:  Other: uro    Travel Screening: Not Applicable

## 2023-11-09 NOTE — TELEPHONE ENCOUNTER
Returned phone call to patient and informed that NP sent in the prescription to Patient's requested pharmacy.     Yamila Torrez LPN

## 2023-11-12 DIAGNOSIS — I10 ESSENTIAL HYPERTENSION, BENIGN: ICD-10-CM

## 2023-11-12 DIAGNOSIS — E78.5 HYPERLIPIDEMIA LDL GOAL <130: ICD-10-CM

## 2023-11-13 RX ORDER — SIMVASTATIN 20 MG
20 TABLET ORAL AT BEDTIME
Qty: 100 TABLET | Refills: 2 | OUTPATIENT
Start: 2023-11-13

## 2023-11-13 RX ORDER — AMLODIPINE BESYLATE 2.5 MG/1
5 TABLET ORAL DAILY
Qty: 200 TABLET | Refills: 2 | OUTPATIENT
Start: 2023-11-13

## 2023-11-13 RX ORDER — TRIAMTERENE/HYDROCHLOROTHIAZID 37.5-25 MG
1 TABLET ORAL DAILY
Qty: 100 TABLET | Refills: 2 | OUTPATIENT
Start: 2023-11-13

## 2023-12-12 ENCOUNTER — OFFICE VISIT (OUTPATIENT)
Dept: INTERNAL MEDICINE | Facility: CLINIC | Age: 88
End: 2023-12-12
Payer: COMMERCIAL

## 2023-12-12 ENCOUNTER — ANCILLARY PROCEDURE (OUTPATIENT)
Dept: GENERAL RADIOLOGY | Facility: CLINIC | Age: 88
End: 2023-12-12
Attending: INTERNAL MEDICINE
Payer: COMMERCIAL

## 2023-12-12 VITALS
OXYGEN SATURATION: 98 % | WEIGHT: 232.5 LBS | HEART RATE: 59 BPM | TEMPERATURE: 97.9 F | HEIGHT: 69 IN | DIASTOLIC BLOOD PRESSURE: 84 MMHG | BODY MASS INDEX: 34.44 KG/M2 | RESPIRATION RATE: 16 BRPM | SYSTOLIC BLOOD PRESSURE: 143 MMHG

## 2023-12-12 DIAGNOSIS — E78.5 HYPERLIPIDEMIA LDL GOAL <130: ICD-10-CM

## 2023-12-12 DIAGNOSIS — Z29.11 NEED FOR VACCINATION AGAINST RESPIRATORY SYNCYTIAL VIRUS: ICD-10-CM

## 2023-12-12 DIAGNOSIS — R06.02 SOB (SHORTNESS OF BREATH): ICD-10-CM

## 2023-12-12 DIAGNOSIS — Z00.00 ENCOUNTER FOR PREVENTATIVE ADULT HEALTH CARE EXAMINATION: ICD-10-CM

## 2023-12-12 DIAGNOSIS — Z00.00 ENCOUNTER FOR PREVENTATIVE ADULT HEALTH CARE EXAMINATION: Primary | ICD-10-CM

## 2023-12-12 DIAGNOSIS — I10 ESSENTIAL HYPERTENSION, BENIGN: ICD-10-CM

## 2023-12-12 DIAGNOSIS — M06.9 RHEUMATOID ARTHRITIS, INVOLVING UNSPECIFIED SITE, UNSPECIFIED WHETHER RHEUMATOID FACTOR PRESENT (H): ICD-10-CM

## 2023-12-12 DIAGNOSIS — J02.9 SORE THROAT: ICD-10-CM

## 2023-12-12 LAB
ALBUMIN UR-MCNC: NEGATIVE MG/DL
APPEARANCE UR: CLEAR
BACTERIA #/AREA URNS HPF: ABNORMAL /HPF
BILIRUB UR QL STRIP: NEGATIVE
COLOR UR AUTO: YELLOW
ERYTHROCYTE [DISTWIDTH] IN BLOOD BY AUTOMATED COUNT: 14.3 % (ref 10–15)
GLUCOSE UR STRIP-MCNC: NEGATIVE MG/DL
HCT VFR BLD AUTO: 36.8 % (ref 40–53)
HGB BLD-MCNC: 12.1 G/DL (ref 13.3–17.7)
HGB UR QL STRIP: ABNORMAL
KETONES UR STRIP-MCNC: NEGATIVE MG/DL
LEUKOCYTE ESTERASE UR QL STRIP: NEGATIVE
MCH RBC QN AUTO: 30.7 PG (ref 26.5–33)
MCHC RBC AUTO-ENTMCNC: 32.9 G/DL (ref 31.5–36.5)
MCV RBC AUTO: 93 FL (ref 78–100)
NITRATE UR QL: NEGATIVE
PH UR STRIP: 6 [PH] (ref 5–7)
PLATELET # BLD AUTO: 252 10E3/UL (ref 150–450)
RBC # BLD AUTO: 3.94 10E6/UL (ref 4.4–5.9)
RBC #/AREA URNS AUTO: ABNORMAL /HPF
SP GR UR STRIP: 1.02 (ref 1–1.03)
UROBILINOGEN UR STRIP-ACNC: 1 E.U./DL
WBC # BLD AUTO: 7.7 10E3/UL (ref 4–11)
WBC #/AREA URNS AUTO: ABNORMAL /HPF

## 2023-12-12 PROCEDURE — 81001 URINALYSIS AUTO W/SCOPE: CPT | Performed by: INTERNAL MEDICINE

## 2023-12-12 PROCEDURE — 80053 COMPREHEN METABOLIC PANEL: CPT | Performed by: INTERNAL MEDICINE

## 2023-12-12 PROCEDURE — 36415 COLL VENOUS BLD VENIPUNCTURE: CPT | Performed by: INTERNAL MEDICINE

## 2023-12-12 PROCEDURE — 71046 X-RAY EXAM CHEST 2 VIEWS: CPT | Mod: TC | Performed by: RADIOLOGY

## 2023-12-12 PROCEDURE — 85027 COMPLETE CBC AUTOMATED: CPT | Performed by: INTERNAL MEDICINE

## 2023-12-12 PROCEDURE — 84443 ASSAY THYROID STIM HORMONE: CPT | Performed by: INTERNAL MEDICINE

## 2023-12-12 PROCEDURE — G0439 PPPS, SUBSEQ VISIT: HCPCS | Performed by: INTERNAL MEDICINE

## 2023-12-12 PROCEDURE — 80061 LIPID PANEL: CPT | Performed by: INTERNAL MEDICINE

## 2023-12-12 PROCEDURE — 99214 OFFICE O/P EST MOD 30 MIN: CPT | Mod: 25 | Performed by: INTERNAL MEDICINE

## 2023-12-12 RX ORDER — RESPIRATORY SYNCYTIAL VIRUS VACCINE 120MCG/0.5
0.5 KIT INTRAMUSCULAR ONCE
Qty: 1 EACH | Refills: 0 | Status: CANCELLED | OUTPATIENT
Start: 2023-12-12 | End: 2023-12-12

## 2023-12-12 ASSESSMENT — ENCOUNTER SYMPTOMS
HEADACHES: 0
HEMATURIA: 0
ABDOMINAL PAIN: 0
COUGH: 1
MYALGIAS: 0
DIARRHEA: 0
JOINT SWELLING: 0
DIZZINESS: 0
DYSURIA: 0
SHORTNESS OF BREATH: 1
WEAKNESS: 1
CONSTIPATION: 1
NERVOUS/ANXIOUS: 0
HEMATOCHEZIA: 0
CHILLS: 0
SORE THROAT: 1
PARESTHESIAS: 0
HEARTBURN: 0
FREQUENCY: 1
ARTHRALGIAS: 0
NAUSEA: 0
EYE PAIN: 0
FEVER: 0
PALPITATIONS: 0

## 2023-12-12 ASSESSMENT — PAIN SCALES - GENERAL: PAINLEVEL: NO PAIN (0)

## 2023-12-12 ASSESSMENT — ACTIVITIES OF DAILY LIVING (ADL): CURRENT_FUNCTION: NO ASSISTANCE NEEDED

## 2023-12-12 NOTE — LETTER
December 19, 2023      Raul Solorzanovikki  [address]  [place] [address]        Dear ,    We are writing to inform you of your test results.      Your recent labs are all normal except for Mild anemia, follow up lab in 6 months.     Resulted Orders   CBC with platelets   Result Value Ref Range    WBC Count 7.7 4.0 - 11.0 10e3/uL    RBC Count 3.94 (L) 4.40 - 5.90 10e6/uL    Hemoglobin 12.1 (L) 13.3 - 17.7 g/dL    Hematocrit 36.8 (L) 40.0 - 53.0 %    MCV 93 78 - 100 fL    MCH 30.7 26.5 - 33.0 pg    MCHC 32.9 31.5 - 36.5 g/dL    RDW 14.3 10.0 - 15.0 %    Platelet Count 252 150 - 450 10e3/uL   Comprehensive metabolic panel (BMP + Alb, Alk Phos, ALT, AST, Total. Bili, TP)   Result Value Ref Range    Sodium 138 135 - 145 mmol/L      Comment:      Reference intervals for this test were updated on 09/26/2023 to more accurately reflect our healthy population. There may be differences in the flagging of prior results with similar values performed with this method. Interpretation of those prior results can be made in the context of the updated reference intervals.     Potassium 4.5 3.4 - 5.3 mmol/L    Carbon Dioxide (CO2) 22 22 - 29 mmol/L    Anion Gap 13 7 - 15 mmol/L    Urea Nitrogen 22.9 8.0 - 23.0 mg/dL    Creatinine 0.97 0.67 - 1.17 mg/dL    GFR Estimate 76 >60 mL/min/1.73m2    Calcium 10.0 8.8 - 10.2 mg/dL    Chloride 103 98 - 107 mmol/L    Glucose 96 70 - 99 mg/dL    Alkaline Phosphatase 102 40 - 150 U/L      Comment:      Reference intervals for this test were updated on 11/14/2023 to more accurately reflect our healthy population. There may be differences in the flagging of prior results with similar values performed with this method. Interpretation of those prior results can be made in the context of the updated reference intervals.    AST 27 0 - 45 U/L      Comment:      Reference intervals for this test were updated on 6/12/2023 to more accurately reflect our healthy population. There  may be differences in the flagging of prior results with similar values performed with this method. Interpretation of those prior results can be made in the context of the updated reference intervals.    ALT 17 0 - 70 U/L      Comment:      Reference intervals for this test were updated on 6/12/2023 to more accurately reflect our healthy population. There may be differences in the flagging of prior results with similar values performed with this method. Interpretation of those prior results can be made in the context of the updated reference intervals.      Protein Total 7.0 6.4 - 8.3 g/dL    Albumin 4.0 3.5 - 5.2 g/dL    Bilirubin Total 0.4 <=1.2 mg/dL   TSH with free T4 reflex   Result Value Ref Range    TSH 1.02 0.30 - 4.20 uIU/mL   UA with Microscopic reflex to Culture - lab collect   Result Value Ref Range    Color Urine Yellow Colorless, Straw, Light Yellow, Yellow    Appearance Urine Clear Clear    Glucose Urine Negative Negative mg/dL    Bilirubin Urine Negative Negative    Ketones Urine Negative Negative mg/dL    Specific Gravity Urine 1.020 1.003 - 1.035    Blood Urine Trace (A) Negative    pH Urine 6.0 5.0 - 7.0    Protein Albumin Urine Negative Negative mg/dL    Urobilinogen Urine 1.0 0.2, 1.0 E.U./dL    Nitrite Urine Negative Negative    Leukocyte Esterase Urine Negative Negative   UA Microscopic with Reflex to Culture   Result Value Ref Range    Bacteria Urine Few (A) None Seen /HPF    RBC Urine 0-2 0-2 /HPF /HPF    WBC Urine 0-5 0-5 /HPF /HPF    Narrative    Urine Culture not indicated       If you have any questions or concerns, please call the clinic at the number listed above.       Sincerely,      Jimmie Finn MD

## 2023-12-12 NOTE — LETTER
December 12, 2023      Raul Solorzanovikki  [address]  [place] [address]        Dear ,    We are writing to inform you of your test results.    Your chest xray is normal.    Resulted Orders   XR Chest 2 Views    Narrative    CHEST TWO VIEWS 12/12/2023 3:57 PM     HISTORY: Encounter for preventative adult health care examination; SOB  (shortness of breath).    COMPARISON: November 5, 2013       Impression    IMPRESSION: There are no acute infiltrates. The cardiac silhouette is  not enlarged. Pulmonary vasculature is unremarkable.    JOSE EDUARDO BURRIS MD         SYSTEM ID:  [id number]       If you have any questions or concerns, please call the clinic at the number listed above.       Sincerely,      Jimmie Finn MD

## 2023-12-12 NOTE — PROGRESS NOTES
"SUBJECTIVE:   Raul is a 88 year old, presenting for the following:  Wellness Visit        12/12/2023     2:51 PM   Additional Questions   Roomed by Masha REMY   Accompanied by n/a       Are you in the first 12 months of your Medicare coverage?  No    Healthy Habits:     In general, how would you rate your overall health?  Good    Frequency of exercise:  1 day/week    Duration of exercise:  15-30 minutes    Do you usually eat at least 4 servings of fruit and vegetables a day, include whole grains    & fiber and avoid regularly eating high fat or \"junk\" foods?  Yes    Taking medications regularly:  Yes    Medication side effects:  None    Ability to successfully perform activities of daily living:  No assistance needed    Home Safety:  Lighting is poor    Hearing Impairment:  Difficulty following a conversation in a noisy restaurant or crowded room, difficult to understand a speaker at a public meeting or Orthodox service and find that men's voices are easier to understand than woman's    In the past 6 months, have you been bothered by leaking of urine? Yes    In general, how would you rate your overall mental or emotional health?  Good    Additional concerns today:  No      Today's PHQ-2 Score:       12/12/2023     2:37 PM   PHQ-2 ( 1999 [employer])   Q1: Little interest or pleasure in doing things 0   Q2: Feeling down, depressed or hopeless 0   PHQ-2 Score 0   Q1: Little interest or pleasure in doing things Not at all   Q2: Feeling down, depressed or hopeless Not at all   PHQ-2 Score 0           Have you ever done Advance Care Planning? (For example, a Health Directive, POLST, or a discussion with a medical provider or your loved ones about your wishes): Yes, patient states has an Advance Care Planning document and will bring a copy to the clinic.       Fall risk  Fallen 2 or more times in the past year?: No  Any fall with injury in the past year?: No  click delete button to remove this line now  Cognitive Screening "   1) Repeat 3 items (Leader, Season, Table)    2) Clock draw: NORMAL  3) 3 item recall: Recalls 3 objects  Results: 3 items recalled: COGNITIVE IMPAIRMENT LESS LIKELY    Mini-CogTM Copyright S Trever. Licensed by the author for use in [facility]; reprinted with permission (sarah beth@[employer]). All rights reserved.      Do you have sleep apnea, excessive snoring or daytime drowsiness? : yes    Reviewed and updated as needed this visit by clinical staff   Tobacco  Allergies  Meds  Problems  Med Hx  Surg Hx  Fam Hx          Reviewed and updated as needed this visit by Provider                 Social History     Tobacco Use     Smoking status: Former     Packs/day: 1.00     Years: 10.00     Additional pack years: 0.00     Total pack years: 10.00     Types: Cigarettes, Pipe     Quit date: 1972     Years since quittin.9     Smokeless tobacco: Never   Substance Use Topics     Alcohol use: Yes     Comment: Infrequent wine/beer             2023     2:37 PM   Alcohol Use   Prescreen: >3 drinks/day or >7 drinks/week? No     Do you have a current opioid prescription? No  Do you use any other controlled substances or medications that are not prescribed by a provider? None          PROBLEMS TO ADD ON...  Has h/o HTN. on medical treatment. BP has been controlled. No side effects from medications. No CP, HA, dizziness. good compliance with medications and low salt diet.  Has H/O hyperlipidemia. On medical treatment and diet. No side effects. No muscle weakness, myalgias or upset stomach.   Has h/o RA. On treatment, controlled symptoms.   Has had COVID in October. Continues to have sore throat, feels tired and SOB on exertion. No fever, cough, chest pain.     Current providers sharing in care for this patient include:   Patient Care Team:  Jose D Vasquez MD as PCP - General (Internal Medicine)  Jose D Vasquez MD as Assigned PCP  Lashell Armstrong PA-C as Physician Assistant (Urology)    The  "following health maintenance items are reviewed in Epic and correct as of today:  Health Maintenance   Topic Date Due     RSV VACCINE (Pregnancy & 60+) (1 - 1-dose 60+ series) Never done     MEDICARE ANNUAL WELLNESS VISIT  10/26/2023     ANNUAL REVIEW OF HM ORDERS  10/26/2023     COVID-19 Vaccine (7 - 2023-24 season) 12/18/2023     FALL RISK ASSESSMENT  12/12/2024     ADVANCE CARE PLANNING  10/31/2027     DTAP/TDAP/TD IMMUNIZATION (2 - Td or Tdap) 10/09/2028     PHQ-2 (once per calendar year)  Completed     INFLUENZA VACCINE  Completed     Pneumococcal Vaccine: 65+ Years  Completed     ZOSTER IMMUNIZATION  Completed     IPV IMMUNIZATION  Aged Out     HPV IMMUNIZATION  Aged Out     MENINGITIS IMMUNIZATION  Aged Out     RSV MONOCLONAL ANTIBODY  Aged Out     Lab work is in process  Labs reviewed in EPIC          Review of Systems   Constitutional:  Negative for chills and fever.   HENT:  Positive for congestion, hearing loss and sore throat. Negative for ear pain.    Eyes:  Positive for visual disturbance. Negative for pain.   Respiratory:  Positive for cough and shortness of breath.    Cardiovascular:  Negative for chest pain, palpitations and peripheral edema.   Gastrointestinal:  Positive for constipation. Negative for abdominal pain, diarrhea, heartburn, hematochezia and nausea.   Genitourinary:  Positive for frequency and impotence. Negative for dysuria, genital sores, hematuria, penile discharge and urgency.   Musculoskeletal:  Negative for arthralgias, joint swelling and myalgias.   Skin:  Negative for rash.   Neurological:  Positive for weakness. Negative for dizziness, headaches and paresthesias.   Psychiatric/Behavioral:  Negative for mood changes. The patient is not nervous/anxious.          OBJECTIVE:   BP (!) 153/84 (BP Location: Left arm, Cuff Size: Adult Large)   Pulse 59   Temp 97.9  F (36.6  C) (Tympanic)   Resp 16   Ht 1.753 m (5' 9\")   Wt 105.5 kg (232 lb 8 oz)   SpO2 98%   BMI 34.33 kg/m   " "Estimated body mass index is 34.33 kg/m  as calculated from the following:    Height as of this encounter: 1.753 m (5' 9\").    Weight as of this encounter: 105.5 kg (232 lb 8 oz).  Physical Exam  GENERAL: elderly, weak appearing, alert and no distress  EYES: Eyes grossly normal to inspection, PERRL and conjunctivae and sclerae normal  HENT: ear canals and TM's normal, nose and mouth without ulcers or lesions  NECK: no adenopathy, no asymmetry, masses, or scars and thyroid normal to palpation  RESP: lungs clear to auscultation - no rales, rhonchi or wheezes, basilar fine crackles   CV: regular rate and rhythm, normal S1 S2, no S3 or S4, no murmur, click or rub, no peripheral edema and peripheral pulses strong  ABDOMEN: soft, obese, nontender, no hepatosplenomegaly, no masses and bowel sounds normal  MS: no gross musculoskeletal defects noted, no edema  SKIN: no suspicious lesions or rashes  NEURO: Normal strength and tone, mentation intact and speech normal  PSYCH: mentation appears normal, affect normal/bright    Diagnostic Test Results:  Labs reviewed in Epic    ASSESSMENT / PLAN:       ICD-10-CM    1. Encounter for preventative adult health care examination  Z00.00 Lipid panel reflex to direct LDL Non-fasting     CBC with platelets     Comprehensive metabolic panel (BMP + Alb, Alk Phos, ALT, AST, Total. Bili, TP)     TSH with free T4 reflex     UA with Microscopic reflex to Culture - lab collect     XR Chest 2 Views      2. Need for vaccination against respiratory syncytial virus  Z29.11       3. SOB (shortness of breath)  R06.02 XR Chest 2 Views     OFFICE/OUTPT VISIT,EST,LEVL IV      4. Essential hypertension, benign  I10 CBC with platelets     Comprehensive metabolic panel (BMP + Alb, Alk Phos, ALT, AST, Total. Bili, TP)     TSH with free T4 reflex     UA with Microscopic reflex to Culture - lab collect     OFFICE/OUTPT VISIT,EST,LEVL IV      5. HYPERLIPIDEMIA LDL GOAL <130  E78.5 Lipid panel reflex to direct " LDL Non-fasting     OFFICE/OUTPT VISIT,EST,LEVL IV      6. Rheumatoid arthritis, involving unspecified site, unspecified whether rheumatoid factor present (H)  M06.9       7. Sore throat  J02.9 Adult ENT  Referral     OFFICE/OUTPT VISIT,EST,LEVL IV        Assess lab work   Assess CXR  ENT assessment   Continue treatment   Monitor BP, low salt diet   Reassess if BP elevated in 1 month     Patient has been advised of split billing requirements and indicates understanding: Yes      COUNSELING:  Reviewed preventive health counseling, as reflected in patient instructions       Regular exercise       Healthy diet/nutrition       Vision screening       Hearing screening       Prostate cancer screening        He reports that he quit smoking about 51 years ago. His smoking use included cigarettes and pipe. He has a 10.00 pack-year smoking history. He has never used smokeless tobacco.      Appropriate preventive services were discussed with this patient, including applicable screening as appropriate for fall prevention, nutrition, physical activity, Tobacco-use cessation, weight loss and cognition.  Checklist reviewing preventive services available has been given to the patient.    Reviewed patients plan of care and provided an AVS. The Intermediate Care Plan ( asthma action plan, low back pain action plan, and migraine action plan) for Raul meets the Care Plan requirement. This Care Plan has been established and reviewed with the Patient.          Jimmie Finn MD  [facility]    Identified Health Risks:  I have reviewed Opioid Use Disorder and Substance Use Disorder risk factors and made any needed referrals.

## 2023-12-13 LAB
ALBUMIN SERPL BCG-MCNC: 4 G/DL (ref 3.5–5.2)
ALP SERPL-CCNC: 102 U/L (ref 40–150)
ALT SERPL W P-5'-P-CCNC: 17 U/L (ref 0–70)
ANION GAP SERPL CALCULATED.3IONS-SCNC: 13 MMOL/L (ref 7–15)
AST SERPL W P-5'-P-CCNC: 27 U/L (ref 0–45)
BILIRUB SERPL-MCNC: 0.4 MG/DL
BUN SERPL-MCNC: 22.9 MG/DL (ref 8–23)
CALCIUM SERPL-MCNC: 10 MG/DL (ref 8.8–10.2)
CHLORIDE SERPL-SCNC: 103 MMOL/L (ref 98–107)
CHOLEST SERPL-MCNC: 138 MG/DL
CREAT SERPL-MCNC: 0.97 MG/DL (ref 0.67–1.17)
DEPRECATED HCO3 PLAS-SCNC: 22 MMOL/L (ref 22–29)
EGFRCR SERPLBLD CKD-EPI 2021: 76 ML/MIN/1.73M2
FASTING STATUS PATIENT QL REPORTED: NO
GLUCOSE SERPL-MCNC: 96 MG/DL (ref 70–99)
HDLC SERPL-MCNC: 42 MG/DL
LDLC SERPL CALC-MCNC: 56 MG/DL
NONHDLC SERPL-MCNC: 96 MG/DL
POTASSIUM SERPL-SCNC: 4.5 MMOL/L (ref 3.4–5.3)
PROT SERPL-MCNC: 7 G/DL (ref 6.4–8.3)
SODIUM SERPL-SCNC: 138 MMOL/L (ref 135–145)
TRIGL SERPL-MCNC: 198 MG/DL
TSH SERPL DL<=0.005 MIU/L-ACNC: 1.02 UIU/ML (ref 0.3–4.2)

## 2023-12-31 ENCOUNTER — DOCUMENTATION ONLY (OUTPATIENT)
Dept: INTERNAL MEDICINE | Facility: CLINIC | Age: 88
End: 2023-12-31

## 2024-01-02 ENCOUNTER — TELEPHONE (OUTPATIENT)
Dept: INTERNAL MEDICINE | Facility: CLINIC | Age: 89
End: 2024-01-02
Payer: COMMERCIAL

## 2024-01-02 VITALS — SYSTOLIC BLOOD PRESSURE: 138 MMHG | DIASTOLIC BLOOD PRESSURE: 84 MMHG

## 2024-01-02 NOTE — TELEPHONE ENCOUNTER
Patient is calling to confirm his blood pressure readings were sent to Dr. Vasquez to review. Routing to triage and PCP.

## 2024-01-02 NOTE — TELEPHONE ENCOUNTER
FYI - Status Update    Who is Calling: patient    Update: pt calling to update bp reading. Was told bp was lianet at last annual wellness and  recommend to send report of reading to get medication readjusted .     Random times. Readings done with sleeve on.     12//82  12//89  12//90  12//95 /143/84  12//84  1/1-162/97 143/93  Does caller want a call/response back: Yes     Could we send this information to you in [facility] or would you prefer to receive a phone call?:   Patient would prefer a phone call   Okay to leave a detailed message?: Yes at Home number on file [phone] (home)

## 2024-02-06 ENCOUNTER — OFFICE VISIT (OUTPATIENT)
Dept: UROLOGY | Facility: CLINIC | Age: 89
End: 2024-02-06
Payer: COMMERCIAL

## 2024-02-06 VITALS
HEIGHT: 69 IN | WEIGHT: 232 LBS | BODY MASS INDEX: 34.36 KG/M2 | DIASTOLIC BLOOD PRESSURE: 78 MMHG | SYSTOLIC BLOOD PRESSURE: 122 MMHG

## 2024-02-06 DIAGNOSIS — N32.81 OAB (OVERACTIVE BLADDER): Primary | ICD-10-CM

## 2024-02-06 LAB — RESIDUAL VOLUME (RV) (EXTERNAL): 89

## 2024-02-06 PROCEDURE — 51798 US URINE CAPACITY MEASURE: CPT | Performed by: NURSE PRACTITIONER

## 2024-02-06 PROCEDURE — 99213 OFFICE O/P EST LOW 20 MIN: CPT | Mod: 25 | Performed by: NURSE PRACTITIONER

## 2024-02-06 RX ORDER — TOLTERODINE 2 MG/1
2 CAPSULE, EXTENDED RELEASE ORAL DAILY
Qty: 90 CAPSULE | Refills: 0 | Status: SHIPPED | OUTPATIENT
Start: 2024-02-06

## 2024-02-06 RX ORDER — MIRABEGRON 25 MG/1
25 TABLET, FILM COATED, EXTENDED RELEASE ORAL DAILY
Qty: 90 TABLET | Refills: 4 | Status: CANCELLED | OUTPATIENT
Start: 2024-02-06

## 2024-02-06 ASSESSMENT — PAIN SCALES - GENERAL: PAINLEVEL: NO PAIN (0)

## 2024-02-06 NOTE — PROGRESS NOTES
Urology Outpatient Visit    Name: Raul Younger    MRN: [id number]   YOB: 1935               Chief Complaint:   Urinary urgency & frequency, incontinence          Impression and Plan:   Impression / Plan:   Raul Younger is a 88 year old male with Hx Prostate cancer and OAB symptoms. Previously trialed on oxybutynin, which gave him excellent symptom control, unfortunately he had to stop this due to side effects, namely dry mouth.       -Unfortunately Myrbetriq is not covered by his insurance.  Will trial tolterodine in hopes of more favorable/tolerable side effects.  -Follow up in ~3mo for Sx recheck & PVR.    Thank you for the opportunity to participate in the care of Raul Younger.     CHELY Nelson, CNP  [facility] - Department of Urology  [phone]          History of Present Illness:   Raul Younger is a 88 year old male here for follow-up on overactive bladder symptomology which has been occurring for about 7 months at this point.  He was started on oxybutynin at the time of last visit, 11-7-2023.  He had excellent control of his urinary symptomology with this medication, he however developed undesirable and intolerable side effects including chapped lips, dry mouth, dry eyes.    History is obtained from patient & EMR          Past Medical History:     Past Medical History:   Diagnosis Date    Arthritis     HTN     Malignant neoplasm (H) 01/2011    Prostate cancer    MI, old     vs. asthma per pt.'s wife    Migraine, unspecified, without mention of intractable migraine without mention of status migrainosus     Abstracted 5/15/02.    MIGUEL (obstructive sleep apnea)     Other and unspecified hyperlipidemia     Abstracted 5/15/02.    Other and unspecified hyperlipidemia     Sleep apnea           Past Surgical History:     Past Surgical History:   Procedure Laterality Date    ABDOMEN SURGERY  1980    diaphram repair    COLONOSCOPY  12/29/2011    Diverticuli, 3 mm  tissue biopsied, path showed no hyperplastic or adenomatous elements    CYSTOSCOPY      ENT SURGERY      GENITOURINARY SURGERY      HEAD & NECK SURGERY      HERNIORRHAPHY UMBILICAL N/A 9/21/2017    Procedure: HERNIORRHAPHY UMBILICAL;  open incarcerated ventral hernia repair with mesh;  Surgeon: Cristina Robbins MD;  Location: [facility] OR    IRRIGATION AND DEBRIDEMENT HIP, COMBINED Left 11/7/2015    Procedure: COMBINED IRRIGATION AND DEBRIDEMENT HIP;  Surgeon: Stanislav Maharaj MD;  Location: [facility] OR    left pelvis hip repaired (after 8 foot fall)      ORTHOPEDIC SURGERY      PHACOEMULSIFICATION CLEAR CORNEA WITH TORIC INTRAOCULAR LENS IMPLANT  5/23/2012    Procedure:PHACOEMULSIFICATION CLEAR CORNEA WITH TORIC INTRAOCULAR LENS IMPLANT; RIGHT PHACOEMULSIFICATION CLEAR CORNEA WITH  DELUXE TORIC INTRAOCULAR LENS IMPLANT; Surgeon:ANDRA LAGUNAS; Location:[facility]    PHACOEMULSIFICATION CLEAR CORNEA WITH TORIC INTRAOCULAR LENS IMPLANT  5/30/2012    Procedure:PHACOEMULSIFICATION CLEAR CORNEA WITH TORIC INTRAOCULAR LENS IMPLANT; LEFT PHACOEMULSIFICATION CLEAR CORNEA WITH TORIC INTRAOCULAR LENS IMPLANT ; Surgeon:ANDRA LAGUNAS; Location:[facility]    PROSTATE SURGERY  1/2010    adwoa placed for left arm fracture      VITRECTOMY PARSPLANA WITH 23 GAUGE SYSTEM  1/11/2013    Procedure: VITRECTOMY PARSPLANA WITH 23 GAUGE SYSTEM;  LEFT 23 GAUGE VITRECTOMY, EPIRETINAL MEMBRANE REMOVAL, AIR FLUID EXCHANGE ;  Surgeon: Lawson Celeste MD;  Location: [facility]    VITRECTOMY PARSPLANA WITH 23 GAUGE SYSTEM  2/4/2014    Procedure: VITRECTOMY PARSPLANA WITH 23 GAUGE SYSTEM;  RIGHT VITRECTOMY PARSPLANA WITH 23 GAUGE SYSTEM, EPIRETINAL MEMBRANE REMOVAL, AIR/FLUID EXCHANGE ;  Surgeon: Lawson Celeste MD;  Location: [facility] NONSPECIFIC PROCEDURE  1996    Paraesophageal Hernia Repair. Abstracted 5/15/02.    [facility] NONSPECIFIC PROCEDURE  1943    Tonsillectomy. Abstracted 5/15/02.          Social History:     Social History     Tobacco Use    Smoking  status: Former     Packs/day: 1.00     Years: 10.00     Additional pack years: 0.00     Total pack years: 10.00     Types: Cigarettes, Pipe     Quit date: 1972     Years since quittin.1    Smokeless tobacco: Never   Substance Use Topics    Alcohol use: Yes     Comment: Infrequent wine/beer          Family History:     Family History   Problem Relation Age of Onset    Family History Negative Mother          age 99 after hip fx, was almost 100    Hypertension Mother     Hyperlipidemia Mother     Neurologic Disorder Father          age 70, ALS, laryngeal CA    C.A.D. Father     Heart Disease Father     Hyperlipidemia Father     Hypertension Father     Heart Disease Sister         Born 193    Coronary Artery Disease Sister     Hypertension Sister     Arthritis Sister         Born 1933    Vision Loss Sister     Hypertension Daughter           Allergies:     Allergies   Allergen Reactions    Latex      Area turns red and peels off    Nifedipine      Low grade headache    Procardia [Nifedipine] Other (See Comments)     headache          Medications:     Current Outpatient Medications   Medication Sig    acetaminophen (TYLENOL) 325 MG tablet Take 2 tablets (650 mg) by mouth every 4 hours as needed for other (mild pain)    amLODIPine (NORVASC) 2.5 MG tablet Take 2 tablets (5 mg) by mouth daily Please call nurse or send [employer] message with home BP reading and date. BP was elevated in the office in 2023.    aspirin 81 MG tablet Take 1 tablet by mouth daily.    dextromethorphan-guaiFENesin (MUCINEX DM)  MG 12 hr tablet Take 2 tablets by mouth every 12 hours    docusate calcium (SURFAK) 240 MG capsule Take 240 mg by mouth daily as needed     fexofenadine (ALLEGRA) 60 MG tablet Take 60 mg by mouth 2 times daily    folic acid (FOLVITE) 1 MG tablet Take 1 mg by mouth daily    InFLIXimab (REMICADE IV) Infusion every 7 weeks    methotrexate 2.5 MG tablet Take 25 mg by mouth once a week On .  Hold on Tuesday, 11/7/2023. Resume taking on 11/14/2023.    Multiple Vitamins-Minerals (PRESERVISION AREDS PO) Take 1 tablet by mouth every morning     omega 3 1000 MG CAPS Take 1,000 mg by mouth daily    simvastatin (ZOCOR) 20 MG tablet Take 1 tablet (20 mg) by mouth at bedtime Hold while taking Paxlovid, then resume as previous.    triamterene-HCTZ (MAXZIDE-25) 37.5-25 MG tablet Take 1 tablet by mouth daily Please call nurse or send [employer] message with home BP reading and date. BP was elevated in the office in December 2023.    Vitamin D3 (CHOLECALCIFEROL) 125 MCG (5000 UT) tablet Take 1 tablet by mouth daily    benzonatate (TESSALON) 200 MG capsule Take 1 capsule (200 mg) by mouth 3 times daily as needed for cough (Patient not taking: Reported on 12/12/2023)    naproxen sodium (ANAPROX) 220 MG tablet Take by mouth as needed for moderate pain Reported on 3/22/2017 (Patient not taking: Reported on 2/6/2024)    NONFORMULARY 1 capsule 2 times daily [facility] Ur[facility] by [facility] (Patient not taking: Reported on 2/6/2024)    oxyBUTYnin (DITROPAN) 5 MG tablet Take 1 tablet (5 mg) by mouth 3 times daily (Patient not taking: Reported on 2/6/2024)     No current facility-administered medications for this visit.          Review of Systems:    ROS: See HPI for pertinent details.  Remainder of 10-point ROS negative.         Physical Exam:   VS:  T: Data Unavailable    HR: Data Unavailable    BP: 122/78    RR: Data Unavailable     GEN:  Alert.  NAD.   HEENT:  Sclerae anicteric.    CV:  No obvious jugular venous distension.  LUNGS: No respiratory distress, breathing comfortably wo accessory muscle use.  ABD:  ND.     SKIN:  Dry. No visible rashes.   NEURO:  CN grossly intact.           Data:   All laboratory data reviewed:    Lab Results   Component Value Date    PSA 0.43 10/30/2023    PSA 0.56 11/05/2021    PSA 0.56 11/07/2019    PSA 0.52 10/18/2018    PSA 0.56 05/31/2018    PSA 0.57 04/19/2018    PSA 0.56  01/19/2018    PSA 0.21 07/10/2017    PSA 0.04 01/09/2017    PSA 0.05 07/11/2016    PSA 0.04 07/16/2015    PSA 0.05 02/19/2015     Lab Results   Component Value Date    CR 0.97 12/12/2023    CR 1.09 10/26/2022    CR 1.11 08/12/2021    CR 1.12 08/17/2020    CR 1.04 07/05/2019    CR 0.88 10/08/2018    CR 1.13 05/31/2018    CR 0.88 09/16/2017    CR 0.89 03/22/2017    CR 1.04 11/30/2016    CR 1.02 03/08/2016    CR 0.75 11/12/2015    CR 0.86 11/11/2015     Lab Results   Component Value Date    GFRESTIMATED 76 12/12/2023    GFRESTIMATED 67 10/26/2022    GFRESTIMATED 61 08/12/2021    GFRESTIMATED 61 08/17/2020    GFRESTIMATED 67 07/05/2019    GFRESTIMATED 83 10/08/2018    GFRESTIMATED 62 05/31/2018    GFRESTIMATED 84 09/16/2017    GFRESTIMATED 82 03/22/2017    GFRESTIMATED 69 11/30/2016    GFRESTIMATED 71 03/08/2016    GFRESTIMATED >90  Non  GFR Calc   11/12/2015    GFRESTIMATED 86 11/11/2015     Color Urine (no units)   Date Value   12/12/2023 Yellow   09/16/2017 Straw     Appearance Urine (no units)   Date Value   12/12/2023 Clear   09/16/2017 Clear     Glucose Urine (mg/dL)   Date Value   12/12/2023 Negative   09/16/2017 Negative     Bilirubin Urine (no units)   Date Value   12/12/2023 Negative   09/16/2017 Negative     Ketones Urine (mg/dL)   Date Value   12/12/2023 Negative   09/16/2017 Negative     Specific Gravity Urine (no units)   Date Value   12/12/2023 1.020   09/16/2017 1.014     pH Urine   Date Value   12/12/2023 6.0   09/16/2017 6.0 pH     Protein Albumin Urine (mg/dL)   Date Value   12/12/2023 Negative   09/16/2017 Negative     Urobilinogen Urine   Date Value   12/12/2023 1.0 E.U./dL   12/08/2016 1.0 EU/dL     Nitrite Urine (no units)   Date Value   12/12/2023 Negative   09/16/2017 Negative     Leukocyte Esterase Urine (no units)   Date Value   12/12/2023 Negative   09/16/2017 Negative

## 2024-02-06 NOTE — NURSING NOTE
Chief Complaint   Patient presents with    Urinary Frequency     3 month symptom check      Pt states urinary symptoms were well controlled when taking oxybutynin, pt states he had to stop med after about 1.5 months due to side effects, dry mouth and dry skin    PVR: 89 mL    Belinda King, CMA

## 2024-02-06 NOTE — LETTER
2/6/2024       RE: Raul Younger  [address]  [facility] [address]     Dear Colleague,    Thank you for referring your patient, Raul Younger, to the [facility] UROLOGY CLINIC [place] at [facility]. Please see a copy of my visit note below.      Urology Outpatient Visit    Name: Raul Younger    MRN: [id number]   YOB: 1935               Chief Complaint:   Urinary urgency & frequency, incontinence          Impression and Plan:   Impression / Plan:   Raul Younger is a 88 year old male with Hx Prostate cancer and OAB symptoms. Previously trialed on oxybutynin, which gave him excellent symptom control, unfortunately he had to stop this due to side effects, namely dry mouth.       -Unfortunately Myrbetriq is not covered by his insurance.  Will trial tolterodine in hopes of more favorable/tolerable side effects.  -Follow up in ~3mo for Sx recheck & PVR.    Thank you for the opportunity to participate in the care of Raul Younger.     CHELY Nelson, CNP   [facility] - Department of Urology  [phone]          History of Present Illness:   Raul Younger is a 88 year old male here for follow-up on overactive bladder symptomology which has been occurring for about 7 months at this point.  He was started on oxybutynin at the time of last visit, 11-7-2023.  He had excellent control of his urinary symptomology with this medication, he however developed undesirable and intolerable side effects including chapped lips, dry mouth, dry eyes.    History is obtained from patient & EMR          Past Medical History:     Past Medical History:   Diagnosis Date    Arthritis     HTN     Malignant neoplasm (H) 01/2011    Prostate cancer    MI, old     vs. asthma per pt.'s wife    Migraine, unspecified, without mention of intractable migraine without mention of status migrainosus     Abstracted 5/15/02.    MIGUEL  (obstructive sleep apnea)     Other and unspecified hyperlipidemia     Abstracted 5/15/02.    Other and unspecified hyperlipidemia     Sleep apnea           Past Surgical History:     Past Surgical History:   Procedure Laterality Date    ABDOMEN SURGERY  1980    diaphram repair    COLONOSCOPY  12/29/2011    Diverticuli, 3 mm tissue biopsied, path showed no hyperplastic or adenomatous elements    CYSTOSCOPY      ENT SURGERY      GENITOURINARY SURGERY      HEAD & NECK SURGERY      HERNIORRHAPHY UMBILICAL N/A 9/21/2017    Procedure: HERNIORRHAPHY UMBILICAL;  open incarcerated ventral hernia repair with mesh;  Surgeon: Cristina Robbins MD;  Location: [facility] OR    IRRIGATION AND DEBRIDEMENT HIP, COMBINED Left 11/7/2015    Procedure: COMBINED IRRIGATION AND DEBRIDEMENT HIP;  Surgeon: Stanislav Maharaj MD;  Location: [facility] OR    left pelvis hip repaired (after 8 foot fall)      ORTHOPEDIC SURGERY      PHACOEMULSIFICATION CLEAR CORNEA WITH TORIC INTRAOCULAR LENS IMPLANT  5/23/2012    Procedure:PHACOEMULSIFICATION CLEAR CORNEA WITH TORIC INTRAOCULAR LENS IMPLANT; RIGHT PHACOEMULSIFICATION CLEAR CORNEA WITH  DELUXE TORIC INTRAOCULAR LENS IMPLANT; Surgeon:ANDRA LAGUNAS; Location:[facility]    PHACOEMULSIFICATION CLEAR CORNEA WITH TORIC INTRAOCULAR LENS IMPLANT  5/30/2012    Procedure:PHACOEMULSIFICATION CLEAR CORNEA WITH TORIC INTRAOCULAR LENS IMPLANT; LEFT PHACOEMULSIFICATION CLEAR CORNEA WITH TORIC INTRAOCULAR LENS IMPLANT ; Surgeon:ANDRA LAGUNAS; Location:[facility]    PROSTATE SURGERY  1/2010    adwoa placed for left arm fracture      VITRECTOMY PARSPLANA WITH 23 GAUGE SYSTEM  1/11/2013    Procedure: VITRECTOMY PARSPLANA WITH 23 GAUGE SYSTEM;  LEFT 23 GAUGE VITRECTOMY, EPIRETINAL MEMBRANE REMOVAL, AIR FLUID EXCHANGE ;  Surgeon: Lawson Celeste MD;  Location: [facility]    VITRECTOMY PARSPLANA WITH 23 GAUGE SYSTEM  2/4/2014    Procedure: VITRECTOMY PARSPLANA WITH 23 GAUGE SYSTEM;  RIGHT VITRECTOMY PARSPLANA WITH 23 GAUGE SYSTEM,  EPIRETINAL MEMBRANE REMOVAL, AIR/FLUID EXCHANGE ;  Surgeon: Lawson Celeste MD;  Location: [facility] NONSPECIFIC PROCEDURE      Paraesophageal Hernia Repair. Abstracted 5/15/02.    [facility] NONSPECIFIC PROCEDURE      Tonsillectomy. Abstracted 5/15/02.          Social History:     Social History     Tobacco Use    Smoking status: Former     Packs/day: 1.00     Years: 10.00     Additional pack years: 0.00     Total pack years: 10.00     Types: Cigarettes, Pipe     Quit date: 1972     Years since quittin.1    Smokeless tobacco: Never   Substance Use Topics    Alcohol use: Yes     Comment: Infrequent wine/beer          Family History:     Family History   Problem Relation Age of Onset    Family History Negative Mother          age 99 after hip fx, was almost 100    Hypertension Mother     Hyperlipidemia Mother     Neurologic Disorder Father          age 70, ALS, laryngeal CA    C.A.D. Father     Heart Disease Father     Hyperlipidemia Father     Hypertension Father     Heart Disease Sister         Born 193    Coronary Artery Disease Sister     Hypertension Sister     Arthritis Sister         Born 1933    Vision Loss Sister     Hypertension Daughter           Allergies:     Allergies   Allergen Reactions    Latex      Area turns red and peels off    Nifedipine      Low grade headache    Procardia [Nifedipine] Other (See Comments)     headache          Medications:     Current Outpatient Medications   Medication Sig    acetaminophen (TYLENOL) 325 MG tablet Take 2 tablets (650 mg) by mouth every 4 hours as needed for other (mild pain)    amLODIPine (NORVASC) 2.5 MG tablet Take 2 tablets (5 mg) by mouth daily Please call nurse or send [employer] message with home BP reading and date. BP was elevated in the office in 2023.    aspirin 81 MG tablet Take 1 tablet by mouth daily.    dextromethorphan-guaiFENesin (MUCINEX DM)  MG 12 hr tablet Take 2 tablets by mouth every 12 hours    docusate  calcium (SURFAK) 240 MG capsule Take 240 mg by mouth daily as needed     fexofenadine (ALLEGRA) 60 MG tablet Take 60 mg by mouth 2 times daily    folic acid (FOLVITE) 1 MG tablet Take 1 mg by mouth daily    InFLIXimab (REMICADE IV) Infusion every 7 weeks    methotrexate 2.5 MG tablet Take 25 mg by mouth once a week On Tuesdays. Hold on Tuesday, 11/7/2023. Resume taking on 11/14/2023.    Multiple Vitamins-Minerals (PRESERVISION AREDS PO) Take 1 tablet by mouth every morning     omega 3 1000 MG CAPS Take 1,000 mg by mouth daily    simvastatin (ZOCOR) 20 MG tablet Take 1 tablet (20 mg) by mouth at bedtime Hold while taking Paxlovid, then resume as previous.    triamterene-HCTZ (MAXZIDE-25) 37.5-25 MG tablet Take 1 tablet by mouth daily Please call nurse or send [employer] message with home BP reading and date. BP was elevated in the office in December 2023.    Vitamin D3 (CHOLECALCIFEROL) 125 MCG (5000 UT) tablet Take 1 tablet by mouth daily    benzonatate (TESSALON) 200 MG capsule Take 1 capsule (200 mg) by mouth 3 times daily as needed for cough (Patient not taking: Reported on 12/12/2023)    naproxen sodium (ANAPROX) 220 MG tablet Take by mouth as needed for moderate pain Reported on 3/22/2017 (Patient not taking: Reported on 2/6/2024)    NONFORMULARY 1 capsule 2 times daily Prostate and Urinary Health by [employer] (Patient not taking: Reported on 2/6/2024)    oxyBUTYnin (DITROPAN) 5 MG tablet Take 1 tablet (5 mg) by mouth 3 times daily (Patient not taking: Reported on 2/6/2024)     No current facility-administered medications for this visit.          Review of Systems:    ROS: See HPI for pertinent details.  Remainder of 10-point ROS negative.         Physical Exam:   VS:  T: Data Unavailable    HR: Data Unavailable    BP: 122/78    RR: Data Unavailable     GEN:  Alert.  NAD.   HEENT:  Sclerae anicteric.    CV:  No obvious jugular venous distension.  LUNGS: No respiratory distress, breathing comfortably wo accessory  muscle use.  ABD:  ND.     SKIN:  Dry. No visible rashes.   NEURO:  CN grossly intact.           Data:   All laboratory data reviewed:    Lab Results   Component Value Date    PSA 0.43 10/30/2023    PSA 0.56 11/05/2021    PSA 0.56 11/07/2019    PSA 0.52 10/18/2018    PSA 0.56 05/31/2018    PSA 0.57 04/19/2018    PSA 0.56 01/19/2018    PSA 0.21 07/10/2017    PSA 0.04 01/09/2017    PSA 0.05 07/11/2016    PSA 0.04 07/16/2015    PSA 0.05 02/19/2015     Lab Results   Component Value Date    CR 0.97 12/12/2023    CR 1.09 10/26/2022    CR 1.11 08/12/2021    CR 1.12 08/17/2020    CR 1.04 07/05/2019    CR 0.88 10/08/2018    CR 1.13 05/31/2018    CR 0.88 09/16/2017    CR 0.89 03/22/2017    CR 1.04 11/30/2016    CR 1.02 03/08/2016    CR 0.75 11/12/2015    CR 0.86 11/11/2015     Lab Results   Component Value Date    GFRESTIMATED 76 12/12/2023    GFRESTIMATED 67 10/26/2022    GFRESTIMATED 61 08/12/2021    GFRESTIMATED 61 08/17/2020    GFRESTIMATED 67 07/05/2019    GFRESTIMATED 83 10/08/2018    GFRESTIMATED 62 05/31/2018    GFRESTIMATED 84 09/16/2017    GFRESTIMATED 82 03/22/2017    GFRESTIMATED 69 11/30/2016    GFRESTIMATED 71 03/08/2016    GFRESTIMATED >90  Non  GFR Calc   11/12/2015    GFRESTIMATED 86 11/11/2015     Color Urine (no units)   Date Value   12/12/2023 Yellow   09/16/2017 Straw     Appearance Urine (no units)   Date Value   12/12/2023 Clear   09/16/2017 Clear     Glucose Urine (mg/dL)   Date Value   12/12/2023 Negative   09/16/2017 Negative     Bilirubin Urine (no units)   Date Value   12/12/2023 Negative   09/16/2017 Negative     Ketones Urine (mg/dL)   Date Value   12/12/2023 Negative   09/16/2017 Negative     Specific Gravity Urine (no units)   Date Value   12/12/2023 1.020   09/16/2017 1.014     pH Urine   Date Value   12/12/2023 6.0   09/16/2017 6.0 pH     Protein Albumin Urine (mg/dL)   Date Value   12/12/2023 Negative   09/16/2017 Negative     Urobilinogen Urine   Date Value   12/12/2023 1.0  E.U./dL   12/08/2016 1.0 EU/dL     Nitrite Urine (no units)   Date Value   12/12/2023 Negative   09/16/2017 Negative     Leukocyte Esterase Urine (no units)   Date Value   12/12/2023 Negative   09/16/2017 Negative

## 2024-02-12 ENCOUNTER — TRANSFERRED RECORDS (OUTPATIENT)
Dept: HEALTH INFORMATION MANAGEMENT | Facility: CLINIC | Age: 89
End: 2024-02-12
Payer: COMMERCIAL

## 2024-03-10 DIAGNOSIS — I10 ESSENTIAL HYPERTENSION, BENIGN: ICD-10-CM

## 2024-03-11 RX ORDER — AMLODIPINE BESYLATE 2.5 MG/1
TABLET ORAL
Qty: 200 TABLET | Refills: 2 | Status: SHIPPED | OUTPATIENT
Start: 2024-03-11

## 2024-03-11 RX ORDER — TRIAMTERENE/HYDROCHLOROTHIAZID 37.5-25 MG
1 TABLET ORAL DAILY
Qty: 100 TABLET | Refills: 2 | Status: SHIPPED | OUTPATIENT
Start: 2024-03-11

## 2024-03-22 DIAGNOSIS — E78.5 HYPERLIPIDEMIA LDL GOAL <130: ICD-10-CM

## 2024-03-22 RX ORDER — SIMVASTATIN 20 MG
20 TABLET ORAL AT BEDTIME
Qty: 90 TABLET | Refills: 1 | Status: SHIPPED | OUTPATIENT
Start: 2024-03-22 | End: 2024-07-02

## 2024-05-07 ENCOUNTER — OFFICE VISIT (OUTPATIENT)
Dept: UROLOGY | Facility: CLINIC | Age: 89
End: 2024-05-07
Payer: COMMERCIAL

## 2024-05-07 VITALS
HEART RATE: 61 BPM | DIASTOLIC BLOOD PRESSURE: 74 MMHG | HEIGHT: 69 IN | SYSTOLIC BLOOD PRESSURE: 143 MMHG | BODY MASS INDEX: 34.07 KG/M2 | WEIGHT: 230 LBS

## 2024-05-07 DIAGNOSIS — N32.81 OAB (OVERACTIVE BLADDER): Primary | ICD-10-CM

## 2024-05-07 LAB — RESIDUAL VOLUME (RV) (EXTERNAL): 25

## 2024-05-07 PROCEDURE — 99214 OFFICE O/P EST MOD 30 MIN: CPT | Mod: 25 | Performed by: NURSE PRACTITIONER

## 2024-05-07 PROCEDURE — 51798 US URINE CAPACITY MEASURE: CPT | Performed by: NURSE PRACTITIONER

## 2024-05-07 RX ORDER — SENNOSIDES 8.6 MG
1 TABLET ORAL DAILY
COMMUNITY

## 2024-05-07 RX ORDER — TOLTERODINE 2 MG/1
2 CAPSULE, EXTENDED RELEASE ORAL DAILY
Qty: 30 CAPSULE | Refills: 0 | Status: SHIPPED | OUTPATIENT
Start: 2024-05-07

## 2024-05-07 RX ORDER — TAMSULOSIN HYDROCHLORIDE 0.4 MG/1
0.4 CAPSULE ORAL DAILY
Qty: 45 CAPSULE | Refills: 0 | Status: SHIPPED | OUTPATIENT
Start: 2024-05-07 | End: 2024-09-18

## 2024-05-07 ASSESSMENT — PAIN SCALES - GENERAL: PAINLEVEL: NO PAIN (0)

## 2024-05-07 NOTE — LETTER
5/7/2024       RE: Raul Younger  [address]  [facility] [address]     Dear Colleague,    Thank you for referring your patient, Raul Younger, to the [facility] UROLOGY CLINIC [place] at [facility]. Please see a copy of my visit note below.      Urology Outpatient Visit    Name: Raul Younger    MRN: [id number]   YOB: 1935               Chief Complaint:   Urinary Symptom or Sign         Impression and Plan:   Impression / Plan:   Raul Younger is a 88 year old male with Hx of Melia 4+4 = 8 adenocarcinoma the prostate. He is treated with cryoablation of the prostate in 2010, OAB.     He would like to trial Flomax given he had not taken this since 2017. We will continue on 2 mg tolterodine. Plan or follow-up in 1 mo. If Sx not considerably improved we will consider increasing dose of Detrol or initiating Gemtesa.     Thank you for the opportunity to participate in the care of Raul Younger.     CHELY Nelson, CNP   [facility] - Department of Urology  [phone]          History of Present Illness:   Raul Younger is a 88 year old male with Hx of Melia 4+4 = 8 adenocarcinoma the prostate. He is treated with cryoablation of the prostate in 2010 by Dr. Nicolas. Here for follow-up on overactive bladder symptomology. He previously has been on Flomax and finasteride did not feel that it helped his urinary symptoms. He was started on oxybutynin 11-7-2023.  He had excellent control of his urinary symptomology with this medication, he however developed undesirable and intolerable side effects including chapped lips, dry mouth, dry eyes. Most recently he has trialed Detrol, he reports his urinary Sx are not under satisfactory control on this medication. He has constipation w this med wich he is managing with Senna. He drinks 30 oz coffee a day.     History is obtained from patient & EMR          Past  Medical History:     Past Medical History:   Diagnosis Date    Arthritis     HTN     Malignant neoplasm (H) 01/2011    Prostate cancer    MI, old     vs. asthma per pt.'s wife    Migraine, unspecified, without mention of intractable migraine without mention of status migrainosus     Abstracted 5/15/02.    MIGUEL (obstructive sleep apnea)     Other and unspecified hyperlipidemia     Abstracted 5/15/02.    Other and unspecified hyperlipidemia     Sleep apnea           Past Surgical History:     Past Surgical History:   Procedure Laterality Date    ABDOMEN SURGERY  1980    diaphram repair    COLONOSCOPY  12/29/2011    Diverticuli, 3 mm tissue biopsied, path showed no hyperplastic or adenomatous elements    CYSTOSCOPY      ENT SURGERY      GENITOURINARY SURGERY      HEAD & NECK SURGERY      HERNIORRHAPHY UMBILICAL N/A 9/21/2017    Procedure: HERNIORRHAPHY UMBILICAL;  open incarcerated ventral hernia repair with mesh;  Surgeon: Cristina Robbins MD;  Location: [facility] OR    IRRIGATION AND DEBRIDEMENT HIP, COMBINED Left 11/7/2015    Procedure: COMBINED IRRIGATION AND DEBRIDEMENT HIP;  Surgeon: Stanislav Maharaj MD;  Location: [facility] OR    left pelvis hip repaired (after 8 foot fall)      ORTHOPEDIC SURGERY      PHACOEMULSIFICATION CLEAR CORNEA WITH TORIC INTRAOCULAR LENS IMPLANT  5/23/2012    Procedure:PHACOEMULSIFICATION CLEAR CORNEA WITH TORIC INTRAOCULAR LENS IMPLANT; RIGHT PHACOEMULSIFICATION CLEAR CORNEA WITH  DELUXE TORIC INTRAOCULAR LENS IMPLANT; Surgeon:ANDRA LAGUNAS; Location:[facility]    PHACOEMULSIFICATION CLEAR CORNEA WITH TORIC INTRAOCULAR LENS IMPLANT  5/30/2012    Procedure:PHACOEMULSIFICATION CLEAR CORNEA WITH TORIC INTRAOCULAR LENS IMPLANT; LEFT PHACOEMULSIFICATION CLEAR CORNEA WITH TORIC INTRAOCULAR LENS IMPLANT ; Surgeon:ANDRA LAGUNAS; Location:[facility]    PROSTATE SURGERY  1/2010    adwoa placed for left arm fracture      VITRECTOMY PARSPLANA WITH 23 GAUGE SYSTEM  1/11/2013    Procedure: VITRECTOMY PARSPLANA  WITH 23 GAUGE SYSTEM;  LEFT 23 GAUGE VITRECTOMY, EPIRETINAL MEMBRANE REMOVAL, AIR FLUID EXCHANGE ;  Surgeon: Lawson Celeste MD;  Location: [facility]    VITRECTOMY PARSPLANA WITH 23 GAUGE SYSTEM  2014    Procedure: VITRECTOMY PARSPLANA WITH 23 GAUGE SYSTEM;  RIGHT VITRECTOMY PARSPLANA WITH 23 GAUGE SYSTEM, EPIRETINAL MEMBRANE REMOVAL, AIR/FLUID EXCHANGE ;  Surgeon: Lawson Celeste MD;  Location: [facility] NONSPECIFIC PROCEDURE      Paraesophageal Hernia Repair. Abstracted 5/15/02.    [facility] NONSPECIFIC PROCEDURE      Tonsillectomy. Abstracted 5/15/02.          Social History:     Social History     Tobacco Use    Smoking status: Former     Current packs/day: 0.00     Average packs/day: 1 pack/day for 10.0 years (10.0 ttl pk-yrs)     Types: Cigarettes, Pipe     Start date: 1962     Quit date: 1972     Years since quittin.3    Smokeless tobacco: Never   Substance Use Topics    Alcohol use: Yes     Comment: Infrequent wine/beer          Family History:     Family History   Problem Relation Age of Onset    Family History Negative Mother          age 99 after hip fx, was almost 100    Hypertension Mother     Hyperlipidemia Mother     Neurologic Disorder Father          age 70, ALS, laryngeal CA    C.A.D. Father     Heart Disease Father     Hyperlipidemia Father     Hypertension Father     Heart Disease Sister         Born 1931    Coronary Artery Disease Sister     Hypertension Sister     Arthritis Sister         Born 1933    Vision Loss Sister     Hypertension Daughter           Allergies:     Allergies   Allergen Reactions    Latex      Area turns red and peels off    Nifedipine      Low grade headache    Procardia [Nifedipine] Other (See Comments)     headache          Medications:     Current Outpatient Medications   Medication Sig Dispense Refill    acetaminophen (TYLENOL) 325 MG tablet Take 2 tablets (650 mg) by mouth every 4 hours as needed for other (mild pain) 100 tablet 0     amLODIPine (NORVASC) 2.5 MG tablet TAKE 2 TABLETS BY MOUTH DAILY  PLEASE CALL NURSE / SEND [employer] MESSAGE WITH HOME BLOOD PRESSURE READING AND DATE 200 tablet 2    aspirin 81 MG tablet Take 1 tablet by mouth daily.      dextromethorphan-guaiFENesin (MUCINEX DM)  MG 12 hr tablet Take 2 tablets by mouth every 12 hours      docusate calcium (SURFAK) 240 MG capsule Take 240 mg by mouth daily as needed       fexofenadine (ALLEGRA) 60 MG tablet Take 60 mg by mouth 2 times daily      folic acid (FOLVITE) 1 MG tablet Take 1 mg by mouth daily      InFLIXimab (REMICADE IV) Infusion every 7 weeks      methotrexate 2.5 MG tablet Take 25 mg by mouth once a week On Tuesdays. Hold on Tuesday, 11/7/2023. Resume taking on 11/14/2023.      Multiple Vitamins-Minerals (PRESERVISION AREDS PO) Take 1 tablet by mouth every morning       naproxen sodium (ANAPROX) 220 MG tablet Take by mouth as needed for moderate pain Reported on 3/22/2017      NONFORMULARY 1 capsule 2 times daily Prostate and Urinary Health by [employer]      omega 3 1000 MG CAPS Take 1,000 mg by mouth daily      sennosides (SENOKOT) 8.6 MG tablet Take 1 tablet by mouth daily      simvastatin (ZOCOR) 20 MG tablet TAKE 1 TABLET BY MOUTH AT  BEDTIME 90 tablet 1    tolterodine ER (DETROL LA) 2 MG 24 hr capsule Take 1 capsule (2 mg) by mouth daily 90 capsule 0    triamterene-HCTZ (MAXZIDE-25) 37.5-25 MG tablet TAKE 1 TABLET BY MOUTH DAILY PLEASE CALL NURSE OR SEND [employer] MESSAGE WITH HOME BP READING AND DATE. BP WAS ELEVATED IN THE OFFICE IN DECEMBER 2023 100 tablet 2    Vitamin D3 (CHOLECALCIFEROL) 125 MCG (5000 UT) tablet Take 1 tablet by mouth daily      benzonatate (TESSALON) 200 MG capsule Take 1 capsule (200 mg) by mouth 3 times daily as needed for cough (Patient not taking: Reported on 12/12/2023) 30 capsule 0    oxyBUTYnin (DITROPAN) 5 MG tablet Take 1 tablet (5 mg) by mouth 3 times daily (Patient not taking: Reported on 2/6/2024) 90 tablet 1     No current  facility-administered medications for this visit.          Review of Systems:    ROS: See HPI for pertinent details.  Remainder of 10-point ROS negative.         Physical Exam:   VS:  T: Data Unavailable    HR: 61    BP: 143/74    RR: Data Unavailable     GEN:  Alert.  NAD.   HEENT:  Sclerae anicteric.    CV:  No obvious jugular venous distension.  LUNGS: No respiratory distress, breathing comfortably wo accessory muscle use.  ABD:  ND.     SKIN:  Dry. No visible rashes.   NEURO:  CN grossly intact.          Laboratory Data:     Lab Results   Component Value Date    PSA 0.43 10/30/2023    PSA 0.56 11/05/2021    PSA 0.56 11/07/2019    PSA 0.52 10/18/2018    PSA 0.56 05/31/2018    PSA 0.57 04/19/2018    PSA 0.56 01/19/2018    PSA 0.21 07/10/2017    PSA 0.04 01/09/2017    PSA 0.05 07/11/2016    PSA 0.04 07/16/2015    PSA 0.05 02/19/2015     Lab Results   Component Value Date    CR 0.97 12/12/2023    CR 1.09 10/26/2022    CR 1.11 08/12/2021    CR 1.12 08/17/2020    CR 1.04 07/05/2019    CR 0.88 10/08/2018    CR 1.13 05/31/2018    CR 0.88 09/16/2017    CR 0.89 03/22/2017    CR 1.04 11/30/2016    CR 1.02 03/08/2016    CR 0.75 11/12/2015    CR 0.86 11/11/2015     Lab Results   Component Value Date    GFRESTIMATED 76 12/12/2023    GFRESTIMATED 67 10/26/2022    GFRESTIMATED 61 08/12/2021    GFRESTIMATED 61 08/17/2020    GFRESTIMATED 67 07/05/2019    GFRESTIMATED 83 10/08/2018    GFRESTIMATED 62 05/31/2018    GFRESTIMATED 84 09/16/2017    GFRESTIMATED 82 03/22/2017    GFRESTIMATED 69 11/30/2016    GFRESTIMATED 71 03/08/2016    GFRESTIMATED >90  Non  GFR Calc   11/12/2015    GFRESTIMATED 86 11/11/2015

## 2024-05-07 NOTE — NURSING NOTE
Chief Complaint   Patient presents with    OAB     Pt here for 3 month follow up      Pt has noticed an increase in urgency with detrol, pt states this has also increased constipation.    PVR: 25 mL    Belinda King, CMA

## 2024-05-07 NOTE — PROGRESS NOTES
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 He is treated with cryoablation of the prostate in 2010, OAB.     He would like to trial Flomax given he had not taken this since 2017. We will continue on 2 mg tolterodine. Plan or follow-up in 1 mo. If Sx not considerably improved we will consider increasing dose of Detrol or initiating Gemtesa.     Thank you for the opportunity to participate in the care of Raul Younger.     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 He is treated with cryoablation of the prostate in 2010 by Dr. Nicolas. Here for follow-up on overactive bladder symptomology. He previously has been on Flomax and finasteride did not feel that it helped his urinary symptoms. He was started on oxybutynin 11-7-2023.  He had excellent control of his urinary symptomology with this medication, he however developed undesirable and intolerable side effects including chapped lips, dry mouth, dry eyes. Most recently he has trialed Detrol, he reports his urinary Sx are not under satisfactory control on this medication. He has constipation w this med wich he is managing with Senna. He drinks 30 oz coffee a day.     History is obtained from patient & EMR          Past Medical History:     Past Medical History:   Diagnosis Date    Arthritis     HTN     Malignant neoplasm (H) 01/2011    Prostate cancer    MI, old     vs. asthma per pt.'s wife    Migraine, unspecified, without mention of intractable migraine without mention of status migrainosus     Abstracted 5/15/02.    MIGUEL (obstructive  sleep apnea)     Other and unspecified hyperlipidemia     Abstracted 5/15/02.    Other and unspecified hyperlipidemia     Sleep apnea           Past Surgical History:     Past Surgical History:   Procedure Laterality Date    ABDOMEN SURGERY  1980    diaphram repair    COLONOSCOPY  12/29/2011    Diverticuli, 3 mm tissue biopsied, path showed no hyperplastic or adenomatous elements    CYSTOSCOPY      ENT SURGERY      GENITOURINARY SURGERY      HEAD & NECK SURGERY      HERNIORRHAPHY UMBILICAL N/A 9/21/2017    Procedure: HERNIORRHAPHY UMBILICAL;  open incarcerated ventral hernia repair with mesh;  Surgeon: Cristina Robbins MD;  Location: [facility] OR    IRRIGATION AND DEBRIDEMENT HIP, COMBINED Left 11/7/2015    Procedure: COMBINED IRRIGATION AND DEBRIDEMENT HIP;  Surgeon: Stanislav Maharaj MD;  Location: [facility] OR    left pelvis hip repaired (after 8 foot fall)      ORTHOPEDIC SURGERY      PHACOEMULSIFICATION CLEAR CORNEA WITH TORIC INTRAOCULAR LENS IMPLANT  5/23/2012    Procedure:PHACOEMULSIFICATION CLEAR CORNEA WITH TORIC INTRAOCULAR LENS IMPLANT; RIGHT PHACOEMULSIFICATION CLEAR CORNEA WITH  DELUXE TORIC INTRAOCULAR LENS IMPLANT; Surgeon:ANDRA LAGUNAS; Location:[facility]    PHACOEMULSIFICATION CLEAR CORNEA WITH TORIC INTRAOCULAR LENS IMPLANT  5/30/2012    Procedure:PHACOEMULSIFICATION CLEAR CORNEA WITH TORIC INTRAOCULAR LENS IMPLANT; LEFT PHACOEMULSIFICATION CLEAR CORNEA WITH TORIC INTRAOCULAR LENS IMPLANT ; Surgeon:ANDRA LAGUNAS; Location:[facility]    PROSTATE SURGERY  1/2010    adwoa placed for left arm fracture      VITRECTOMY PARSPLANA WITH 23 GAUGE SYSTEM  1/11/2013    Procedure: VITRECTOMY PARSPLANA WITH 23 GAUGE SYSTEM;  LEFT 23 GAUGE VITRECTOMY, EPIRETINAL MEMBRANE REMOVAL, AIR FLUID EXCHANGE ;  Surgeon: Lawson Celeste MD;  Location: [facility]    VITRECTOMY PARSPLANA WITH 23 GAUGE SYSTEM  2/4/2014    Procedure: VITRECTOMY PARSPLANA WITH 23 GAUGE SYSTEM;  RIGHT VITRECTOMY PARSPLANA WITH 23 GAUGE SYSTEM, EPIRETINAL  MEMBRANE REMOVAL, AIR/FLUID EXCHANGE ;  Surgeon: Lawson Celeste MD;  Location: [facility] NONSPECIFIC PROCEDURE      Paraesophageal Hernia Repair. Abstracted 5/15/02.    [facility] NONSPECIFIC PROCEDURE      Tonsillectomy. Abstracted 5/15/02.          Social History:     Social History     Tobacco Use    Smoking status: Former     Current packs/day: 0.00     Average packs/day: 1 pack/day for 10.0 years (10.0 ttl pk-yrs)     Types: Cigarettes, Pipe     Start date: 1962     Quit date: 1972     Years since quittin.3    Smokeless tobacco: Never   Substance Use Topics    Alcohol use: Yes     Comment: Infrequent wine/beer          Family History:     Family History   Problem Relation Age of Onset    Family History Negative Mother          age 99 after hip fx, was almost 100    Hypertension Mother     Hyperlipidemia Mother     Neurologic Disorder Father          age 70, ALS, laryngeal CA    C.A.D. Father     Heart Disease Father     Hyperlipidemia Father     Hypertension Father     Heart Disease Sister         Born 193    Coronary Artery Disease Sister     Hypertension Sister     Arthritis Sister         Born 1933    Vision Loss Sister     Hypertension Daughter           Allergies:     Allergies   Allergen Reactions    Latex      Area turns red and peels off    Nifedipine      Low grade headache    Procardia [Nifedipine] Other (See Comments)     headache          Medications:     Current Outpatient Medications   Medication Sig Dispense Refill    acetaminophen (TYLENOL) 325 MG tablet Take 2 tablets (650 mg) by mouth every 4 hours as needed for other (mild pain) 100 tablet 0    amLODIPine (NORVASC) 2.5 MG tablet TAKE 2 TABLETS BY MOUTH DAILY  PLEASE CALL NURSE / SEND [employer] MESSAGE WITH HOME BLOOD PRESSURE READING AND DATE 200 tablet 2    aspirin 81 MG tablet Take 1 tablet by mouth daily.      dextromethorphan-guaiFENesin (MUCINEX DM)  MG 12 hr tablet Take 2 tablets by mouth every 12 hours       docusate calcium (SURFAK) 240 MG capsule Take 240 mg by mouth daily as needed       fexofenadine (ALLEGRA) 60 MG tablet Take 60 mg by mouth 2 times daily      folic acid (FOLVITE) 1 MG tablet Take 1 mg by mouth daily      InFLIXimab (REMICADE IV) Infusion every 7 weeks      methotrexate 2.5 MG tablet Take 25 mg by mouth once a week On Tuesdays. Hold on Tuesday, 11/7/2023. Resume taking on 11/14/2023.      Multiple Vitamins-Minerals (PRESERVISION AREDS PO) Take 1 tablet by mouth every morning       naproxen sodium (ANAPROX) 220 MG tablet Take by mouth as needed for moderate pain Reported on 3/22/2017      NONFORMULARY 1 capsule 2 times daily Prostate and Urinary Health by [employer]      omega 3 1000 MG CAPS Take 1,000 mg by mouth daily      sennosides (SENOKOT) 8.6 MG tablet Take 1 tablet by mouth daily      simvastatin (ZOCOR) 20 MG tablet TAKE 1 TABLET BY MOUTH AT  BEDTIME 90 tablet 1    tolterodine ER (DETROL LA) 2 MG 24 hr capsule Take 1 capsule (2 mg) by mouth daily 90 capsule 0    triamterene-HCTZ (MAXZIDE-25) 37.5-25 MG tablet TAKE 1 TABLET BY MOUTH DAILY PLEASE CALL NURSE OR SEND [employer] MESSAGE WITH HOME BP READING AND DATE. BP WAS ELEVATED IN THE OFFICE IN DECEMBER 2023 100 tablet 2    Vitamin D3 (CHOLECALCIFEROL) 125 MCG (5000 UT) tablet Take 1 tablet by mouth daily      benzonatate (TESSALON) 200 MG capsule Take 1 capsule (200 mg) by mouth 3 times daily as needed for cough (Patient not taking: Reported on 12/12/2023) 30 capsule 0    oxyBUTYnin (DITROPAN) 5 MG tablet Take 1 tablet (5 mg) by mouth 3 times daily (Patient not taking: Reported on 2/6/2024) 90 tablet 1     No current facility-administered medications for this visit.          Review of Systems:    ROS: See HPI for pertinent details.  Remainder of 10-point ROS negative.         Physical Exam:   VS:  T: Data Unavailable    HR: 61    BP: 143/74    RR: Data Unavailable     GEN:  Alert.  NAD.   HEENT:  Sclerae anicteric.    CV:  No obvious  jugular venous distension.  LUNGS: No respiratory distress, breathing comfortably wo accessory muscle use.  ABD:  ND.     SKIN:  Dry. No visible rashes.   NEURO:  CN grossly intact.          Laboratory Data:     Lab Results   Component Value Date    PSA 0.43 10/30/2023    PSA 0.56 11/05/2021    PSA 0.56 11/07/2019    PSA 0.52 10/18/2018    PSA 0.56 05/31/2018    PSA 0.57 04/19/2018    PSA 0.56 01/19/2018    PSA 0.21 07/10/2017    PSA 0.04 01/09/2017    PSA 0.05 07/11/2016    PSA 0.04 07/16/2015    PSA 0.05 02/19/2015     Lab Results   Component Value Date    CR 0.97 12/12/2023    CR 1.09 10/26/2022    CR 1.11 08/12/2021    CR 1.12 08/17/2020    CR 1.04 07/05/2019    CR 0.88 10/08/2018    CR 1.13 05/31/2018    CR 0.88 09/16/2017    CR 0.89 03/22/2017    CR 1.04 11/30/2016    CR 1.02 03/08/2016    CR 0.75 11/12/2015    CR 0.86 11/11/2015     Lab Results   Component Value Date    GFRESTIMATED 76 12/12/2023    GFRESTIMATED 67 10/26/2022    GFRESTIMATED 61 08/12/2021    GFRESTIMATED 61 08/17/2020    GFRESTIMATED 67 07/05/2019    GFRESTIMATED 83 10/08/2018    GFRESTIMATED 62 05/31/2018    GFRESTIMATED 84 09/16/2017    GFRESTIMATED 82 03/22/2017    GFRESTIMATED 69 11/30/2016    GFRESTIMATED 71 03/08/2016    GFRESTIMATED >90  Non  GFR Calc   11/12/2015    GFRESTIMATED 86 11/11/2015

## 2024-05-21 ENCOUNTER — TELEPHONE (OUTPATIENT)
Dept: UROLOGY | Facility: CLINIC | Age: 89
End: 2024-05-21
Payer: COMMERCIAL

## 2024-05-21 DIAGNOSIS — R39.9 URINARY SYMPTOM OR SIGN: Primary | ICD-10-CM

## 2024-05-21 NOTE — TELEPHONE ENCOUNTER
[facility] Call Center    Phone Message    May a detailed message be left on voicemail: yes     Reason for Call: Patient is calling asking what medication Jayda Garcia APRN CNP recommended patient to take. Please call patient back to discuss.    Action Taken: Other: [place] -Urology    Travel Screening: Not Applicable

## 2024-05-30 DIAGNOSIS — E78.5 HYPERLIPIDEMIA LDL GOAL <130: ICD-10-CM

## 2024-05-30 RX ORDER — SIMVASTATIN 20 MG
20 TABLET ORAL AT BEDTIME
Qty: 100 TABLET | Refills: 2 | OUTPATIENT
Start: 2024-05-30

## 2024-06-11 ENCOUNTER — VIRTUAL VISIT (OUTPATIENT)
Dept: UROLOGY | Facility: CLINIC | Age: 89
End: 2024-06-11
Payer: COMMERCIAL

## 2024-06-11 DIAGNOSIS — R39.9 LOWER URINARY TRACT SYMPTOMS (LUTS): Primary | ICD-10-CM

## 2024-06-11 PROCEDURE — 99213 OFFICE O/P EST LOW 20 MIN: CPT | Mod: 95 | Performed by: NURSE PRACTITIONER

## 2024-06-11 ASSESSMENT — PAIN SCALES - GENERAL: PAINLEVEL: NO PAIN (0)

## 2024-06-11 NOTE — PROGRESS NOTES
Virtual Visit Details  Type of service:  Video Visit   Video Start Time: 11:07 AM  Video End Time:11:18 AM  Originating Location (pt. 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 He is treated with cryoablation of the prostate in 2010. He has chronic OAB-like Sx.       -Will trial Gemtesa in place with tolterodine to see if this improves his constipation.  - Continue daily Flomax 0.4 mg.  - Follow-up in 3 months for PVR, symptom recheck, med refill    Thank you for the opportunity to participate in the care of Raul Younger.     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 He is treated with cryoablation of the prostate in 2010 by Dr. Nicolas. Here for follow-up on overactive bladder symptomology. He previously has been on Flomax and finasteride did not feel that it helped his urinary symptoms. He was started on oxybutynin 11-7-2023.  He had excellent control of his urinary symptomology with this medication, he however developed undesirable and intolerable side effects including chapped lips, dry mouth, dry eyes. Most recently he has trialed Detrol, his urinary Sx were not under satisfactory control on this medication so he was again trialed on Flomax. He has constipation w Detrol which he is managing with Senna. He has had good improvement in his urinary symptoms with this combination. He would like to trial Gemtesa in place of Detrol for a few months to see if his constipation improves  with this.     History is obtained from patient & EMR          Past Medical History:     Past Medical History:   Diagnosis Date    Arthritis     HTN     Malignant neoplasm (H) 01/2011    Prostate cancer    MI, old     vs. asthma per pt.'s wife    Migraine, unspecified, without mention of intractable migraine without mention of status migrainosus     Abstracted 5/15/02.    MIGUEL (obstructive sleep apnea)     Other and unspecified hyperlipidemia     Abstracted 5/15/02.    Other and unspecified hyperlipidemia     Sleep apnea           Past Surgical History:     Past Surgical History:   Procedure Laterality Date    ABDOMEN SURGERY  1980    diaphram repair    COLONOSCOPY  12/29/2011    Diverticuli, 3 mm tissue biopsied, path showed no hyperplastic or adenomatous elements    CYSTOSCOPY      ENT SURGERY      GENITOURINARY SURGERY      HEAD & NECK SURGERY      HERNIORRHAPHY UMBILICAL N/A 9/21/2017    Procedure: HERNIORRHAPHY UMBILICAL;  open incarcerated ventral hernia repair with mesh;  Surgeon: Cristina Robbins MD;  Location: [facility] OR    IRRIGATION AND DEBRIDEMENT HIP, COMBINED Left 11/7/2015    Procedure: COMBINED IRRIGATION AND DEBRIDEMENT HIP;  Surgeon: Stanislav Maharaj MD;  Location: [facility] OR    left pelvis hip repaired (after 8 foot fall)      ORTHOPEDIC SURGERY      PHACOEMULSIFICATION CLEAR CORNEA WITH TORIC INTRAOCULAR LENS IMPLANT  5/23/2012    Procedure:PHACOEMULSIFICATION CLEAR CORNEA WITH TORIC INTRAOCULAR LENS IMPLANT; RIGHT PHACOEMULSIFICATION CLEAR CORNEA WITH  DELUXE TORIC INTRAOCULAR LENS IMPLANT; Surgeon:ANDRA LAGUNAS; Location:[facility]    PHACOEMULSIFICATION CLEAR CORNEA WITH TORIC INTRAOCULAR LENS IMPLANT  5/30/2012    Procedure:PHACOEMULSIFICATION CLEAR CORNEA WITH TORIC INTRAOCULAR LENS IMPLANT; LEFT PHACOEMULSIFICATION CLEAR CORNEA WITH TORIC INTRAOCULAR LENS IMPLANT ; Surgeon:ANDRA LAGUNAS; Location:[facility]    PROSTATE SURGERY  1/2010    adwoa placed for left arm fracture      VITRECTOMY PARSPLANA  WITH 23 GAUGE SYSTEM  2013    Procedure: VITRECTOMY PARSPLANA WITH 23 GAUGE SYSTEM;  LEFT 23 GAUGE VITRECTOMY, EPIRETINAL MEMBRANE REMOVAL, AIR FLUID EXCHANGE ;  Surgeon: Lawson Celeste MD;  Location: [facility]    VITRECTOMY PARSPLANA WITH 23 GAUGE SYSTEM  2014    Procedure: VITRECTOMY PARSPLANA WITH 23 GAUGE SYSTEM;  RIGHT VITRECTOMY PARSPLANA WITH 23 GAUGE SYSTEM, EPIRETINAL MEMBRANE REMOVAL, AIR/FLUID EXCHANGE ;  Surgeon: Lawson Celeste MD;  Location: [facility] NONSPECIFIC PROCEDURE      Paraesophageal Hernia Repair. Abstracted 5/15/02.    [facility] NONSPECIFIC PROCEDURE      Tonsillectomy. Abstracted 5/15/02.          Social History:     Social History     Tobacco Use    Smoking status: Former     Current packs/day: 0.00     Average packs/day: 1 pack/day for 10.0 years (10.0 ttl pk-yrs)     Types: Cigarettes, Pipe     Start date: 1962     Quit date: 1972     Years since quittin.4    Smokeless tobacco: Never   Substance Use Topics    Alcohol use: Yes     Comment: Infrequent wine/beer          Family History:     Family History   Problem Relation Age of Onset    Family History Negative Mother          age 99 after hip fx, was almost 100    Hypertension Mother     Hyperlipidemia Mother     Neurologic Disorder Father          age 70, ALS, laryngeal CA    C.A.D. Father     Heart Disease Father     Hyperlipidemia Father     Hypertension Father     Heart Disease Sister         Born 1931    Coronary Artery Disease Sister     Hypertension Sister     Arthritis Sister         Born 1933    Vision Loss Sister     Hypertension Daughter           Allergies:     Allergies   Allergen Reactions    Latex      Area turns red and peels off    Nifedipine      Low grade headache    Procardia [Nifedipine] Other (See Comments)     headache          Medications:     Current Outpatient Medications   Medication Sig Dispense Refill    acetaminophen (TYLENOL) 325 MG tablet Take 2 tablets (650 mg) by mouth  every 4 hours as needed for other (mild pain) 100 tablet 0    amLODIPine (NORVASC) 2.5 MG tablet TAKE 2 TABLETS BY MOUTH DAILY  PLEASE CALL NURSE / SEND [employer] MESSAGE WITH HOME BLOOD PRESSURE READING AND DATE 200 tablet 2    aspirin 81 MG tablet Take 1 tablet by mouth daily.      benzonatate (TESSALON) 200 MG capsule Take 1 capsule (200 mg) by mouth 3 times daily as needed for cough (Patient not taking: Reported on 12/12/2023) 30 capsule 0    dextromethorphan-guaiFENesin (MUCINEX DM)  MG 12 hr tablet Take 2 tablets by mouth every 12 hours      docusate calcium (SURFAK) 240 MG capsule Take 240 mg by mouth daily as needed       fexofenadine (ALLEGRA) 60 MG tablet Take 60 mg by mouth 2 times daily      folic acid (FOLVITE) 1 MG tablet Take 1 mg by mouth daily      InFLIXimab (REMICADE IV) Infusion every 7 weeks      methotrexate 2.5 MG tablet Take 25 mg by mouth once a week On Tuesdays. Hold on Tuesday, 11/7/2023. Resume taking on 11/14/2023.      Multiple Vitamins-Minerals (PRESERVISION AREDS PO) Take 1 tablet by mouth every morning       naproxen sodium (ANAPROX) 220 MG tablet Take by mouth as needed for moderate pain Reported on 3/22/2017      NONFORMULARY 1 capsule 2 times daily Prostate and Urinary Health by [employer]      omega 3 1000 MG CAPS Take 1,000 mg by mouth daily      oxyBUTYnin (DITROPAN) 5 MG tablet Take 1 tablet (5 mg) by mouth 3 times daily (Patient not taking: Reported on 2/6/2024) 90 tablet 1    sennosides (SENOKOT) 8.6 MG tablet Take 1 tablet by mouth daily      simvastatin (ZOCOR) 20 MG tablet TAKE 1 TABLET BY MOUTH AT  BEDTIME 90 tablet 1    tamsulosin (FLOMAX) 0.4 MG capsule Take 1 capsule (0.4 mg) by mouth daily 45 capsule 0    tolterodine ER (DETROL LA) 2 MG 24 hr capsule Take 1 capsule (2 mg) by mouth daily 30 capsule 0    tolterodine ER (DETROL LA) 2 MG 24 hr capsule Take 1 capsule (2 mg) by mouth daily 90 capsule 0    triamterene-HCTZ (MAXZIDE-25) 37.5-25 MG tablet TAKE 1 TABLET BY  MOUTH DAILY PLEASE CALL NURSE OR SEND [employer] MESSAGE WITH HOME BP READING AND DATE. BP WAS ELEVATED IN THE OFFICE IN DECEMBER 2023 100 tablet 2    vibegron (GEMTESA) 75 MG TABS tablet Take 1 tablet (75 mg) by mouth daily 90 tablet 4    Vitamin D3 (CHOLECALCIFEROL) 125 MCG (5000 UT) tablet Take 1 tablet by mouth daily       No current facility-administered medications for this visit.          Review of Systems:    ROS: See HPI for pertinent details.  Remainder of 10-point ROS negative.         Physical Exam:   VS:  T: Data Unavailable    HR: Data Unavailable    BP: Data Unavailable    RR: Data Unavailable     GEN:  Alert.  NAD.    HEENT:  Sclerae anicteric.    CV:  No obvious jugular venous distension  LUNGS: No respiratory distress, breathing comfortably wo accessory muscle use.  SKIN:  No visible rash  NEURO:  CN grossly intact.          Laboratory Data:     Lab Results   Component Value Date    PSA 0.43 10/30/2023    PSA 0.56 11/05/2021    PSA 0.56 11/07/2019    PSA 0.52 10/18/2018    PSA 0.56 05/31/2018    PSA 0.57 04/19/2018    PSA 0.56 01/19/2018     Lab Results   Component Value Date    CR 0.97 12/12/2023    CR 1.09 10/26/2022    CR 1.11 08/12/2021    CR 1.12 08/17/2020    CR 1.04 07/05/2019    CR 0.88 10/08/2018    CR 1.13 05/31/2018    CR 0.88 09/16/2017     Lab Results   Component Value Date    GFRESTIMATED 76 12/12/2023    GFRESTIMATED 67 10/26/2022    GFRESTIMATED 61 08/12/2021    GFRESTIMATED 61 08/17/2020    GFRESTIMATED 67 07/05/2019    GFRESTIMATED 83 10/08/2018    GFRESTIMATED 62 05/31/2018    GFRESTIMATED 84 09/16/2017

## 2024-06-11 NOTE — NURSING NOTE
Is the patient currently in the state of [place]? YES    Visit mode:VIDEO    If the visit is dropped, the patient can be reconnected by: VIDEO VISIT: Text to cell phone:   Telephone Information:   Mobile [phone]       Will anyone else be joining the visit? NO  (If patient encounters technical issues they should call [phone]    How would you like to obtain your AVS? MyChart    Are changes needed to the allergy or medication list? No, pt declined to go over individually, stated no changes    Are refills needed on medications prescribed by this physician? YES    Reason for visit: RECHECK (1 month follow-up )    Ayanna MORAN

## 2024-06-11 NOTE — LETTER
6/11/2024       RE: Raul Younger  [address]  [facility] [address]     Dear Colleague,    Thank you for referring your patient, Raul Younger, to the [facility] [place] [facility]. Please see a copy of my visit note below.    Virtual Visit Details  Type of service:  Video Visit   Video Start Time: 11:07 AM  Video End Time:11:18 AM  Originating Location (pt. 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 He is treated with cryoablation of the prostate in 2010. He has chronic OAB-like Sx.       -Will trial Gemtesa in place with tolterodine to see if this improves his constipation.  - Continue daily Flomax 0.4 mg.  - Follow-up in 3 months for PVR, symptom recheck, med refill    Thank you for the opportunity to participate in the care of Raul Younger.     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 He is treated with cryoablation of the prostate in 2010 by Dr. Nicolas. Here for follow-up on overactive bladder symptomology. He previously has been on Flomax and finasteride did not feel that it helped his urinary symptoms. He was started on oxybutynin 11-7-2023.  He had excellent control of his urinary symptomology with this medication, he however developed undesirable and intolerable side effects including chapped lips, dry mouth, dry eyes. Most recently he has trialed Detrol, his urinary Sx were  not under satisfactory control on this medication so he was again trialed on Flomax. He has constipation w Detrol which he is managing with Senna. He has had good improvement in his urinary symptoms with this combination. He would like to trial Gemtesa in place of Detrol for a few months to see if his constipation improves with this.     History is obtained from patient & EMR          Past Medical History:     Past Medical History:   Diagnosis Date    Arthritis     HTN     Malignant neoplasm (H) 01/2011    Prostate cancer    MI, old     vs. asthma per pt.'s wife    Migraine, unspecified, without mention of intractable migraine without mention of status migrainosus     Abstracted 5/15/02.    MIGUEL (obstructive sleep apnea)     Other and unspecified hyperlipidemia     Abstracted 5/15/02.    Other and unspecified hyperlipidemia     Sleep apnea           Past Surgical History:     Past Surgical History:   Procedure Laterality Date    ABDOMEN SURGERY  1980    diaphram repair    COLONOSCOPY  12/29/2011    Diverticuli, 3 mm tissue biopsied, path showed no hyperplastic or adenomatous elements    CYSTOSCOPY      ENT SURGERY      GENITOURINARY SURGERY      HEAD & NECK SURGERY      HERNIORRHAPHY UMBILICAL N/A 9/21/2017    Procedure: HERNIORRHAPHY UMBILICAL;  open incarcerated ventral hernia repair with mesh;  Surgeon: Cristina Robbins MD;  Location: [facility] OR    IRRIGATION AND DEBRIDEMENT HIP, COMBINED Left 11/7/2015    Procedure: COMBINED IRRIGATION AND DEBRIDEMENT HIP;  Surgeon: Stanislav Maharaj MD;  Location: [facility] OR    left pelvis hip repaired (after 8 foot fall)      ORTHOPEDIC SURGERY      PHACOEMULSIFICATION CLEAR CORNEA WITH TORIC INTRAOCULAR LENS IMPLANT  5/23/2012    Procedure:PHACOEMULSIFICATION CLEAR CORNEA WITH TORIC INTRAOCULAR LENS IMPLANT; RIGHT PHACOEMULSIFICATION CLEAR CORNEA WITH  DELUXE TORIC INTRAOCULAR LENS IMPLANT; Surgeon:ANDRA LAGUNAS; Location:[facility]    PHACOEMULSIFICATION CLEAR CORNEA WITH TORIC  INTRAOCULAR LENS IMPLANT  2012    Procedure:PHACOEMULSIFICATION CLEAR CORNEA WITH TORIC INTRAOCULAR LENS IMPLANT; LEFT PHACOEMULSIFICATION CLEAR CORNEA WITH TORIC INTRAOCULAR LENS IMPLANT ; Surgeon:ANDRA LAGUNAS; Location:[facility]    PROSTATE SURGERY  2010    adwoa placed for left arm fracture      VITRECTOMY PARSPLANA WITH 23 GAUGE SYSTEM  2013    Procedure: VITRECTOMY PARSPLANA WITH 23 GAUGE SYSTEM;  LEFT 23 GAUGE VITRECTOMY, EPIRETINAL MEMBRANE REMOVAL, AIR FLUID EXCHANGE ;  Surgeon: Lawson Celeste MD;  Location: [facility]    VITRECTOMY PARSPLANA WITH 23 GAUGE SYSTEM  2014    Procedure: VITRECTOMY PARSPLANA WITH 23 GAUGE SYSTEM;  RIGHT VITRECTOMY PARSPLANA WITH 23 GAUGE SYSTEM, EPIRETINAL MEMBRANE REMOVAL, AIR/FLUID EXCHANGE ;  Surgeon: Lawson Celeste MD;  Location: [facility] NONSPECIFIC PROCEDURE      Paraesophageal Hernia Repair. Abstracted 5/15/02.    [facility] NONSPECIFIC PROCEDURE      Tonsillectomy. Abstracted 5/15/02.          Social History:     Social History     Tobacco Use    Smoking status: Former     Current packs/day: 0.00     Average packs/day: 1 pack/day for 10.0 years (10.0 ttl pk-yrs)     Types: Cigarettes, Pipe     Start date: 1962     Quit date: 1972     Years since quittin.4    Smokeless tobacco: Never   Substance Use Topics    Alcohol use: Yes     Comment: Infrequent wine/beer          Family History:     Family History   Problem Relation Age of Onset    Family History Negative Mother          age 99 after hip fx, was almost 100    Hypertension Mother     Hyperlipidemia Mother     Neurologic Disorder Father          age 70, ALS, laryngeal CA    C.A.D. Father     Heart Disease Father     Hyperlipidemia Father     Hypertension Father     Heart Disease Sister         Born 1931    Coronary Artery Disease Sister     Hypertension Sister     Arthritis Sister         Born 1933    Vision Loss Sister     Hypertension Daughter           Allergies:     Allergies    Allergen Reactions    Latex      Area turns red and peels off    Nifedipine      Low grade headache    Procardia [Nifedipine] Other (See Comments)     headache          Medications:     Current Outpatient Medications   Medication Sig Dispense Refill    acetaminophen (TYLENOL) 325 MG tablet Take 2 tablets (650 mg) by mouth every 4 hours as needed for other (mild pain) 100 tablet 0    amLODIPine (NORVASC) 2.5 MG tablet TAKE 2 TABLETS BY MOUTH DAILY  PLEASE CALL NURSE / SEND [employer] MESSAGE WITH HOME BLOOD PRESSURE READING AND DATE 200 tablet 2    aspirin 81 MG tablet Take 1 tablet by mouth daily.      benzonatate (TESSALON) 200 MG capsule Take 1 capsule (200 mg) by mouth 3 times daily as needed for cough (Patient not taking: Reported on 12/12/2023) 30 capsule 0    dextromethorphan-guaiFENesin (MUCINEX DM)  MG 12 hr tablet Take 2 tablets by mouth every 12 hours      docusate calcium (SURFAK) 240 MG capsule Take 240 mg by mouth daily as needed       fexofenadine (ALLEGRA) 60 MG tablet Take 60 mg by mouth 2 times daily      folic acid (FOLVITE) 1 MG tablet Take 1 mg by mouth daily      InFLIXimab (REMICADE IV) Infusion every 7 weeks      methotrexate 2.5 MG tablet Take 25 mg by mouth once a week On Tuesdays. Hold on Tuesday, 11/7/2023. Resume taking on 11/14/2023.      Multiple Vitamins-Minerals (PRESERVISION AREDS PO) Take 1 tablet by mouth every morning       naproxen sodium (ANAPROX) 220 MG tablet Take by mouth as needed for moderate pain Reported on 3/22/2017      NONFORMULARY 1 capsule 2 times daily Prostate and Ur[facility] by [employer]      omega 3 1000 MG CAPS Take 1,000 mg by mouth daily      oxyBUTYnin (DITROPAN) 5 MG tablet Take 1 tablet (5 mg) by mouth 3 times daily (Patient not taking: Reported on 2/6/2024) 90 tablet 1    sennosides (SENOKOT) 8.6 MG tablet Take 1 tablet by mouth daily      simvastatin (ZOCOR) 20 MG tablet TAKE 1 TABLET BY MOUTH AT  BEDTIME 90 tablet 1    tamsulosin (FLOMAX) 0.4 MG  capsule Take 1 capsule (0.4 mg) by mouth daily 45 capsule 0    tolterodine ER (DETROL LA) 2 MG 24 hr capsule Take 1 capsule (2 mg) by mouth daily 30 capsule 0    tolterodine ER (DETROL LA) 2 MG 24 hr capsule Take 1 capsule (2 mg) by mouth daily 90 capsule 0    triamterene-HCTZ (MAXZIDE-25) 37.5-25 MG tablet TAKE 1 TABLET BY MOUTH DAILY PLEASE CALL NURSE OR SEND [employer] MESSAGE WITH HOME BP READING AND DATE. BP WAS ELEVATED IN THE OFFICE IN DECEMBER 2023 100 tablet 2    vibegron (GEMTESA) 75 MG TABS tablet Take 1 tablet (75 mg) by mouth daily 90 tablet 4    Vitamin D3 (CHOLECALCIFEROL) 125 MCG (5000 UT) tablet Take 1 tablet by mouth daily       No current facility-administered medications for this visit.          Review of Systems:    ROS: See HPI for pertinent details.  Remainder of 10-point ROS negative.         Physical Exam:   VS:  T: Data Unavailable    HR: Data Unavailable    BP: Data Unavailable    RR: Data Unavailable     GEN:  Alert.  NAD.    HEENT:  Sclerae anicteric.    CV:  No obvious jugular venous distension  LUNGS: No respiratory distress, breathing comfortably wo accessory muscle use.  SKIN:  No visible rash  NEURO:  CN grossly intact.          Laboratory Data:     Lab Results   Component Value Date    PSA 0.43 10/30/2023    PSA 0.56 11/05/2021    PSA 0.56 11/07/2019    PSA 0.52 10/18/2018    PSA 0.56 05/31/2018    PSA 0.57 04/19/2018    PSA 0.56 01/19/2018     Lab Results   Component Value Date    CR 0.97 12/12/2023    CR 1.09 10/26/2022    CR 1.11 08/12/2021    CR 1.12 08/17/2020    CR 1.04 07/05/2019    CR 0.88 10/08/2018    CR 1.13 05/31/2018    CR 0.88 09/16/2017     Lab Results   Component Value Date    GFRESTIMATED 76 12/12/2023    GFRESTIMATED 67 10/26/2022    GFRESTIMATED 61 08/12/2021    GFRESTIMATED 61 08/17/2020    GFRESTIMATED 67 07/05/2019    GFRESTIMATED 83 10/08/2018    GFRESTIMATED 62 05/31/2018    GFRESTIMATED 84 09/16/2017

## 2024-07-02 DIAGNOSIS — E78.5 HYPERLIPIDEMIA LDL GOAL <130: ICD-10-CM

## 2024-07-02 RX ORDER — SIMVASTATIN 20 MG
20 TABLET ORAL AT BEDTIME
Qty: 100 TABLET | Refills: 0 | Status: SHIPPED | OUTPATIENT
Start: 2024-07-02

## 2024-09-09 DIAGNOSIS — E78.5 HYPERLIPIDEMIA LDL GOAL <130: ICD-10-CM

## 2024-09-09 RX ORDER — SIMVASTATIN 20 MG
20 TABLET ORAL AT BEDTIME
Qty: 100 TABLET | Refills: 2 | OUTPATIENT
Start: 2024-09-09

## 2024-09-18 ENCOUNTER — TELEPHONE (OUTPATIENT)
Dept: UROLOGY | Facility: CLINIC | Age: 89
End: 2024-09-18
Payer: COMMERCIAL

## 2024-09-18 DIAGNOSIS — N32.81 OAB (OVERACTIVE BLADDER): ICD-10-CM

## 2024-09-18 RX ORDER — TAMSULOSIN HYDROCHLORIDE 0.4 MG/1
0.4 CAPSULE ORAL DAILY
Qty: 45 CAPSULE | Refills: 0 | Status: SHIPPED | OUTPATIENT
Start: 2024-09-18

## 2024-09-18 NOTE — TELEPHONE ENCOUNTER
[facility] Call Center    Phone Message    May a detailed message be left on voicemail: yes     Reason for Call: Medication Refill Request    Has the patient contacted the pharmacy for the refill? Yes   Name of medication being requested: tamsulosin (FLOMAX) 0.4 MG capsule  Provider who prescribed the medication: Jayda Garcia APRN CNP   Pharmacy: [id number] - [address] AT [facility] [address]  Date medication is needed: within 6 days, per pt.     Patient is asking if he should continue this medication.    Action Taken: Other: [facility] - Urology    Travel Screening: Not Applicable     Date of Service:

## 2024-09-18 NOTE — TELEPHONE ENCOUNTER
Spoke with patient, informed him to continue on Flomax until his follow up with Jayda Garcia CNP on 10/8/24.  Refilled Flomax rx.    Danuta Nicholas RN, BSN  Care Coordinator  [facility]

## 2024-10-08 ENCOUNTER — TELEPHONE (OUTPATIENT)
Dept: UROLOGY | Facility: CLINIC | Age: 89
End: 2024-10-08

## 2024-10-08 NOTE — TELEPHONE ENCOUNTER
Per provider - sent doximity link but patient didn't join. Patient needs to be rescheduled.   Bobbilynn Grossaint

## 2024-11-08 DIAGNOSIS — N32.81 OAB (OVERACTIVE BLADDER): ICD-10-CM

## 2024-11-08 RX ORDER — TAMSULOSIN HYDROCHLORIDE 0.4 MG/1
0.4 CAPSULE ORAL DAILY
Qty: 90 CAPSULE | Refills: 2 | Status: SHIPPED | OUTPATIENT
Start: 2024-11-08

## 2024-12-10 DIAGNOSIS — I10 ESSENTIAL HYPERTENSION, BENIGN: ICD-10-CM

## 2024-12-11 RX ORDER — AMLODIPINE BESYLATE 2.5 MG/1
5 TABLET ORAL DAILY
Qty: 90 TABLET | Refills: 7 | OUTPATIENT
Start: 2024-12-11

## 2024-12-13 ENCOUNTER — OFFICE VISIT (OUTPATIENT)
Dept: INTERNAL MEDICINE | Facility: CLINIC | Age: 89
End: 2024-12-13
Payer: COMMERCIAL

## 2024-12-13 VITALS
HEIGHT: 69 IN | BODY MASS INDEX: 35.6 KG/M2 | RESPIRATION RATE: 18 BRPM | HEART RATE: 68 BPM | DIASTOLIC BLOOD PRESSURE: 78 MMHG | TEMPERATURE: 97.1 F | SYSTOLIC BLOOD PRESSURE: 124 MMHG | OXYGEN SATURATION: 98 % | WEIGHT: 240.4 LBS

## 2024-12-13 DIAGNOSIS — Z00.00 ENCOUNTER FOR MEDICARE ANNUAL WELLNESS EXAM: Primary | ICD-10-CM

## 2024-12-13 DIAGNOSIS — E78.5 HYPERLIPIDEMIA LDL GOAL <130: ICD-10-CM

## 2024-12-13 DIAGNOSIS — Z79.899 MEDICATION MANAGEMENT: ICD-10-CM

## 2024-12-13 DIAGNOSIS — E66.01 CLASS 2 SEVERE OBESITY DUE TO EXCESS CALORIES WITH SERIOUS COMORBIDITY AND BODY MASS INDEX (BMI) OF 35.0 TO 35.9 IN ADULT (H): ICD-10-CM

## 2024-12-13 DIAGNOSIS — E66.812 CLASS 2 SEVERE OBESITY DUE TO EXCESS CALORIES WITH SERIOUS COMORBIDITY AND BODY MASS INDEX (BMI) OF 35.0 TO 35.9 IN ADULT (H): ICD-10-CM

## 2024-12-13 DIAGNOSIS — C61 PROSTATE CANCER (H): ICD-10-CM

## 2024-12-13 DIAGNOSIS — I10 ESSENTIAL HYPERTENSION, BENIGN: ICD-10-CM

## 2024-12-13 DIAGNOSIS — M05.9 RHEUMATOID ARTHRITIS WITH POSITIVE RHEUMATOID FACTOR, INVOLVING UNSPECIFIED SITE (H): ICD-10-CM

## 2024-12-13 LAB
ALBUMIN SERPL BCG-MCNC: 4.2 G/DL (ref 3.5–5.2)
ALP SERPL-CCNC: 115 U/L (ref 40–150)
ALT SERPL W P-5'-P-CCNC: 16 U/L (ref 0–70)
ANION GAP SERPL CALCULATED.3IONS-SCNC: 11 MMOL/L (ref 7–15)
AST SERPL W P-5'-P-CCNC: 20 U/L (ref 0–45)
BILIRUB SERPL-MCNC: 0.6 MG/DL
BUN SERPL-MCNC: 20.7 MG/DL (ref 8–23)
CALCIUM SERPL-MCNC: 10 MG/DL (ref 8.8–10.4)
CHLORIDE SERPL-SCNC: 103 MMOL/L (ref 98–107)
CREAT SERPL-MCNC: 1.05 MG/DL (ref 0.67–1.17)
EGFRCR SERPLBLD CKD-EPI 2021: 69 ML/MIN/1.73M2
FASTING STATUS PATIENT QL REPORTED: YES
GLUCOSE SERPL-MCNC: 95 MG/DL (ref 70–99)
HCO3 SERPL-SCNC: 24 MMOL/L (ref 22–29)
POTASSIUM SERPL-SCNC: 4.2 MMOL/L (ref 3.4–5.3)
PROT SERPL-MCNC: 6.8 G/DL (ref 6.4–8.3)
SODIUM SERPL-SCNC: 138 MMOL/L (ref 135–145)
TSH SERPL DL<=0.005 MIU/L-ACNC: 2.08 UIU/ML (ref 0.3–4.2)

## 2024-12-13 PROCEDURE — 80061 LIPID PANEL: CPT | Performed by: INTERNAL MEDICINE

## 2024-12-13 PROCEDURE — 84443 ASSAY THYROID STIM HORMONE: CPT | Performed by: INTERNAL MEDICINE

## 2024-12-13 PROCEDURE — 99214 OFFICE O/P EST MOD 30 MIN: CPT | Mod: 25 | Performed by: INTERNAL MEDICINE

## 2024-12-13 PROCEDURE — G0439 PPPS, SUBSEQ VISIT: HCPCS | Performed by: INTERNAL MEDICINE

## 2024-12-13 PROCEDURE — 80053 COMPREHEN METABOLIC PANEL: CPT | Performed by: INTERNAL MEDICINE

## 2024-12-13 PROCEDURE — 36415 COLL VENOUS BLD VENIPUNCTURE: CPT | Performed by: INTERNAL MEDICINE

## 2024-12-13 RX ORDER — SIMVASTATIN 20 MG
20 TABLET ORAL AT BEDTIME
Qty: 90 TABLET | Refills: 3 | Status: SHIPPED | OUTPATIENT
Start: 2024-12-13

## 2024-12-13 RX ORDER — AMLODIPINE BESYLATE 2.5 MG/1
2.5 TABLET ORAL DAILY
Qty: 90 TABLET | Refills: 3 | Status: SHIPPED | OUTPATIENT
Start: 2024-12-13

## 2024-12-13 RX ORDER — TRIAMTERENE AND HYDROCHLOROTHIAZIDE 37.5; 25 MG/1; MG/1
1 TABLET ORAL DAILY
Qty: 90 TABLET | Refills: 3 | Status: SHIPPED | OUTPATIENT
Start: 2024-12-13

## 2024-12-13 SDOH — HEALTH STABILITY: PHYSICAL HEALTH: ON AVERAGE, HOW MANY DAYS PER WEEK DO YOU ENGAGE IN MODERATE TO STRENUOUS EXERCISE (LIKE A BRISK WALK)?: 1 DAY

## 2024-12-13 ASSESSMENT — LIFESTYLE VARIABLES
SKIP TO QUESTIONS 9-10: 1
HOW MANY STANDARD DRINKS CONTAINING ALCOHOL DO YOU HAVE ON A TYPICAL DAY: 1 OR 2
HOW OFTEN DO YOU HAVE SIX OR MORE DRINKS ON ONE OCCASION: NEVER
HOW OFTEN DO YOU HAVE A DRINK CONTAINING ALCOHOL: MONTHLY OR LESS
AUDIT-C TOTAL SCORE: 1

## 2024-12-13 ASSESSMENT — SOCIAL DETERMINANTS OF HEALTH (SDOH): HOW OFTEN DO YOU GET TOGETHER WITH FRIENDS OR RELATIVES?: TWICE A WEEK

## 2024-12-13 NOTE — PROGRESS NOTES
"Preventive Care Visit  [facility]  Jose D Vasquez MD, Internal Medicine  Dec 13, 2024  {Provider  Link to [facility] :248999}    Assessment & Plan     {Diag Picklist:085254}    Patient has been advised of split billing requirements and indicates understanding: Yes        BMI  Estimated body mass index is 35.5 kg/m  as calculated from the following:    Height as of this encounter: 1.753 m (5' 9\").    Weight as of this encounter: 109 kg (240 lb 6.4 oz).   Weight management plan: Discussed healthy diet and exercise guidelines    Counseling  Appropriate preventive services were addressed with this patient via screening, questionnaire, or discussion as appropriate for fall prevention, nutrition, physical activity, Tobacco-use cessation, social engagement, weight loss and cognition.  Checklist reviewing preventive services available has been given to the patient.  Reviewed patient's diet, addressing concerns and/or questions.   He is at risk for lack of exercise and has been provided with information to increase physical activity for the benefit of his well-being.   Discussed possible causes of fatigue. The patient was provided with written information regarding signs of hearing loss.   Information on urinary incontinence and treatment options given to patient.       {FOLLOW UP PLANS (Optional) Includes COVID19 Treatment Plan:960487}    Subjective   Raul is a 89 year old, presenting for the following:  Medicare Visit        12/13/2024     8:33 AM   Additional Questions   Roomed by Yeti   Accompanied by Wife    {ROOMER positive Fall Risk- Gait Speed Test is required click here to document the Gait Speed Test and then refresh the note to pull in results  :731483}  {ROOMER if patient is in their first year of Medicare a vision screen is required click here to document the Vison screen and then refresh the note to pull in results  :685284}      HPI  In addition to an Annual Wellness Exam, we " addressed rheumatoid arthritis, hypertension, hyperlipidemia, prostate cancer, morbid obesity, obstructive sleep apnea       Hyperlipidemia Follow-Up    Are you regularly taking any medication or supplement to lower your cholesterol?   Yes- Zocor   Are you having muscle aches or other side effects that you think could be caused by your cholesterol lowering medication?  No    Hypertension Follow-up    Do you check your blood pressure regularly outside of the clinic? Yes   Are you following a low salt diet? Yes  Are your blood pressures ever more than 140 on the top number (systolic) OR more   than 90 on the bottom number (diastolic), for example 140/90? Yes 2 weeks ago     Health Care Directive  Patient does not have a Health Care Directive: Discussed advance care planning with patient; however, patient declined at this time.      12/13/2024   General Health   How would you rate your overall physical health? Good   Feel stress (tense, anxious, or unable to sleep) Not at all            12/13/2024   Nutrition   Diet: Regular (no restrictions)            12/13/2024   Exercise   Days per week of moderate/strenous exercise 1 day            12/13/2024   Social Factors   Frequency of gathering with friends or relatives Twice a week            12/12/2023   Activities of Daily Living- Home Safety   Needs help with the following daily activites NO assistance is needed   Safety concerns in the home Poor lighting            12/13/2024   Dental   Dentist two times every year? Yes            12/12/2023   Hearing Screening   Hearing concerns? Difficulty following a conversation in a noisy restaurant or crowded room    Difficult to understand a speaker at a public meeting or [employer] service    Find that men's voices are easier to understand than woman's       Multiple values from one day are sorted in reverse-chronological order            No data to display                   {Rooming Staff Patient needs a PHQ as part of the AWV.   Use this link to complete and then refresh the note to pull results Link to PHQ2 Assessment :701273}    Today's PHQ-2 Score:       2024     8:43 AM   PHQ-2 (  [employer])   Q1: Little interest or pleasure in doing things 0    Q2: Feeling down, depressed or hopeless 0    PHQ-2 Score 0    Q1: Little interest or pleasure in doing things Not at all   Q2: Feeling down, depressed or hopeless Not at all   PHQ-2 Score 0       Patient-reported         2024   Substance Use   Alcohol more than 3/day or more than 7/wk No        Social History     Tobacco Use    Smoking status: Former     Current packs/day: 0.00     Average packs/day: 1 pack/day for 10.0 years (10.0 ttl pk-yrs)     Types: Cigarettes, Pipe     Start date: 1962     Quit date: 1972     Years since quittin.9    Smokeless tobacco: Never   Vaping Use    Vaping status: Never Used   Substance Use Topics    Alcohol use: Yes     Comment: Infrequent wine/beer    Drug use: No     {Provider  If there are gaps in the social history shown above, please follow the link to update and then refresh the note Link to Social and Substance History :387036}    {Provider  REQUIRED FOR AWV Use the storyboard to review patient history, after sections have been marked as reviewed, refresh note to capture documentation:791263}  {Provider   REQUIRED AWV use this link to review and update sexual activity history  after section has been marked as reviewed, refresh note to capture documentation:469764}  Reviewed and updated as needed this visit by Provider                    Past medical, family and social histories as well as medications reviewed and updated as needed.    Current providers sharing in care for this patient include:  Patient Care Team:  Jose D Vasquez MD as PCP - General (Internal Medicine)  Lashell Armstrong PA-C as Physician Assistant (Urology)  Jayda Garcia APRN CNP as Assigned Surgical Provider  Jimmie Finn MD as Assigned  "PCP  Jayda Garcia APRN CNP as Nurse Practitioner (Urology)    The following health maintenance items are reviewed in Epic and correct as of today:  Health Maintenance   Topic Date Due    RSV VACCINE (1 - 1-dose 75+ series) Never done    BMP  12/12/2024    LIPID  12/12/2024    ANNUAL REVIEW OF HM ORDERS  12/12/2024    COVID-19 Vaccine (8 - 2024-25 season) 12/12/2024    MEDICARE ANNUAL WELLNESS VISIT  12/12/2024    FALL RISK ASSESSMENT  12/13/2025    DTAP/TDAP/TD IMMUNIZATION (2 - Td or Tdap) 10/09/2028    ADVANCE CARE PLANNING  12/13/2029    PHQ-2 (once per calendar year)  Completed    INFLUENZA VACCINE  Completed    Pneumococcal Vaccine: 65+ Years  Completed    ZOSTER IMMUNIZATION  Completed    HPV IMMUNIZATION  Aged Out    MENINGITIS IMMUNIZATION  Aged Out    RSV MONOCLONAL ANTIBODY  Aged Out       REVIEW OF SYSTEMS: The following systems have been completely reviewed and are negative except as noted above:   Constitutional, HEENT, respiratory, cardiovascular, gastrointestinal, genitourinary, musculoskeletal, dermatologic, hematologic, endocrine, psychiatric, and neurologic systems.       Objective    Exam  /78 (BP Location: Left arm, Patient Position: Sitting, Cuff Size: Adult Large)   Pulse 68   Temp 97.1  F (36.2  C) (Tympanic)   Resp 18   Ht 1.753 m (5' 9\")   Wt 109 kg (240 lb 6.4 oz)   SpO2 98%   BMI 35.50 kg/m     Estimated body mass index is 35.5 kg/m  as calculated from the following:    Height as of this encounter: 1.753 m (5' 9\").    Weight as of this encounter: 109 kg (240 lb 6.4 oz).    Physical Exam  {Exam Choices (Optional):113274}         12/13/2024   Mini Cog   Clock Draw Score 2 Normal   3 Item Recall 3 objects recalled   Mini Cog Total Score 5        {A Mini-Cog total score of 0-2 suggests the possibility of dementia, score of 3-5 suggests no dementia:026366}         Signed Electronically by: Jose D Vasquez MD  {Email feedback regarding this note to " [email or web address]   :393434}

## 2024-12-13 NOTE — PATIENT INSTRUCTIONS
Patient Education   Preventive Care Advice   This is general advice given by our system to help you stay healthy. However, your care team may have specific advice just for you. Please talk to your care team about your preventive care needs.  Nutrition  Eat 5 or more servings of fruits and vegetables each day.  Try wheat bread, brown rice and whole grain pasta (instead of white bread, rice, and pasta).  Get enough calcium and vitamin D. Check the label on foods and aim for 100% of the RDA (recommended daily allowance).  Lifestyle  Exercise at least 150 minutes each week  (30 minutes a day, 5 days a week).  Do muscle strengthening activities 2 days a week. These help control your weight and prevent disease.  No smoking.  Wear sunscreen to prevent skin cancer.  Have a dental exam and cleaning every 6 months.  Yearly exams  See your health care team every year to talk about:  Any changes in your health.  Any medicines your care team has prescribed.  Preventive care, family planning, and ways to prevent chronic diseases.  Shots (vaccines)   HPV shots (up to age 26), if you've never had them before.  Hepatitis B shots (up to age 59), if you've never had them before.  COVID-19 shot: Get this shot when it's due.  Flu shot: Get a flu shot every year.  Tetanus shot: Get a tetanus shot every 10 years.  Pneumococcal, hepatitis A, and RSV shots: Ask your care team if you need these based on your risk.  Shingles shot (for age 50 and up)  General health tests  Diabetes screening:  Starting at age 35, Get screened for diabetes at least every 3 years.  If you are younger than age 35, ask your care team if you should be screened for diabetes.  Cholesterol test: At age 39, start having a cholesterol test every 5 years, or more often if advised.  Bone density scan (DEXA): At age 50, ask your care team if you should have this scan for osteoporosis (brittle bones).  Hepatitis C: Get tested at least once in your life.  STIs (sexually  transmitted infections)  Before age 24: Ask your care team if you should be screened for STIs.  After age 24: Get screened for STIs if you're at risk. You are at risk for STIs (including HIV) if:  You are sexually active with more than one person.  You don't use condoms every time.  You or a partner was diagnosed with a sexually transmitted infection.  If you are at risk for HIV, ask about PrEP medicine to prevent HIV.  Get tested for HIV at least once in your life, whether you are at risk for HIV or not.  Cancer screening tests  Cervical cancer screening: If you have a cervix, begin getting regular cervical cancer screening tests starting at age 21.  Breast cancer scan (mammogram): If you've ever had breasts, begin having regular mammograms starting at age 40. This is a scan to check for breast cancer.  Colon cancer screening: It is important to start screening for colon cancer at age 45.  Have a colonoscopy test every 10 years (or more often if you're at risk) Or, ask your provider about stool tests like a FIT test every year or Cologuard test every 3 years.  To learn more about your testing options, visit:   .  For help making a decision, visit:   https://bit.ly/rm55980.  Prostate cancer screening test: If you have a prostate, ask your care team if a prostate cancer screening test (PSA) at age 55 is right for you.  Lung cancer screening: If you are a current or former smoker ages 50 to 80, ask your care team if ongoing lung cancer screenings are right for you.  For informational purposes only. Not to replace the advice of your health care provider. Copyright   2023 [facility]. All rights reserved. Clinically reviewed by the [facility]. [employer] 472812 - REV 01/24.  Preventing Falls: Care Instructions  Injuries and health problems such as trouble walking or poor eyesight can increase your risk of falling. So can some medicines. But there are things you can do to help  "prevent falls. You can exercise to get stronger. You can also arrange your home to make it safer.    Talk to your doctor about the medicines you take. Ask if any of them increase the risk of falls and whether they can be changed or stopped.   Try to exercise regularly. It can help improve your strength and balance. This can help lower your risk of falling.         Practice fall safety and prevention.   Wear low-heeled shoes that fit well and give your feet good support. Talk to your doctor if you have foot problems that make this hard.  Carry a cellphone or wear a medical alert device that you can use to call for help.  Use stepladders instead of chairs to reach high objects. Don't climb if you're at risk for falls. Ask for help, if needed.  Wear the correct eyeglasses, if you need them.        Make your home safer.   Remove rugs, cords, clutter, and furniture from walkways.  Keep your house well lit. Use night-lights in hallways and bathrooms.  Install and use sturdy handrails on stairways.  Wear nonskid footwear, even inside. Don't walk barefoot or in socks without shoes.        Be safe outside.   Use handrails, curb cuts, and ramps whenever possible.  Keep your hands free by using a shoulder bag or backpack.  Try to walk in well-lit areas. Watch out for uneven ground, changes in pavement, and debris.  Be careful in the winter. Walk on the grass or gravel when sidewalks are slippery. Use de-icer on steps and walkways. Add non-slip devices to shoes.    Put grab bars and nonskid mats in your shower or tub and near the toilet. Try to use a shower chair or bath bench when bathing.   Get into a tub or shower by putting in your weaker leg first. Get out with your strong side first. Have a phone or medical alert device in the bathroom with you.   Where can you learn more?  Go to https://www.[employer].net/patiented  Enter G117 in the search box to learn more about \"Preventing Falls: Care Instructions.\"  Current as of: " July 17, 2023  Content Version: 14.2 2024 [facility]   Care instructions adapted under license by your healthcare professional. If you have questions about a medical condition or this instruction, always ask your healthcare professional. [employer] disclaims any warranty or liability for your use of this information.    Hearing Loss: Care Instructions  Overview     Hearing loss is a sudden or slow decrease in how well you hear. It can range from slight to profound. Permanent hearing loss can occur with aging. It also can happen when you are exposed long-term to loud noise. Examples include listening to loud music, riding motorcycles, or being around other loud machines.  Hearing loss can affect your work and home life. It can make you feel lonely or depressed. You may feel that you have lost your independence. But hearing aids and other devices can help you hear better and feel connected to others.  Follow-up care is a key part of your treatment and safety. Be sure to make and go to all appointments, and call your doctor if you are having problems. It's also a good idea to know your test results and keep a list of the medicines you take.  How can you care for yourself at home?  Avoid loud noises whenever possible. This helps keep your hearing from getting worse.  Always wear hearing protection around loud noises.  Wear a hearing aid as directed.  A professional can help you pick a hearing aid that will work best for you.  You can also get hearing aids over the counter for mild to moderate hearing loss.  Have hearing tests as your doctor suggests. They can show whether your hearing has changed. Your hearing aid may need to be adjusted.  Use other devices as needed. These may include:  Telephone amplifiers and hearing aids that can connect to a television, stereo, radio, or microphone.  Devices that use lights or vibrations. These alert you to the doorbell, a ringing telephone, or a baby  "monitor.  Television closed-captioning. This shows the words at the bottom of the screen. Most new TVs can do this.  TTY (text telephone). This lets you type messages back and forth on the telephone instead of talking or listening. These devices are also called TDD. When messages are typed on the keyboard, they are sent over the phone line to a receiving TTY. The message is shown on a monitor.  Use text messaging, social media, and email if it is hard for you to communicate by telephone.  Try to learn a listening technique called speechreading. It is not lipreading. You pay attention to people's gestures, expressions, posture, and tone of voice. These clues can help you understand what a person is saying. Face the person you are talking to, and have them face you. Make sure the lighting is good. You need to see the other person's face clearly.  Think about counseling if you need help to adjust to your hearing loss.  When should you call for help?  Watch closely for changes in your health, and be sure to contact your doctor if:    You think your hearing is getting worse.     You have new symptoms, such as dizziness or nausea.   Where can you learn more?  Go to https://www.[employer].net/patiented  Enter R798 in the search box to learn more about \"Hearing Loss: Care Instructions.\"  Current as of: September 27, 2023  Content Version: 14.2 2024 [facility].   Care instructions adapted under license by your healthcare professional. If you have questions about a medical condition or this instruction, always ask your healthcare professional. [employer] Inc[employer] disclaims any warranty or liability for your use of this information.    Learning About Sleeping Well  What does sleeping well mean?     Sleeping well means getting enough sleep to feel good and stay healthy. How much sleep is enough varies among people.  The number of hours you sleep and how you feel when you wake up are both important. If you do " not feel refreshed, you probably need more sleep. Another sign of not getting enough sleep is feeling tired during the day.  Experts recommend that adults get at least 7 or more hours of sleep per day. Children and older adults need more sleep.  Why is getting enough sleep important?  Getting enough quality sleep is a basic part of good health. When your sleep suffers, your physical health, mood, and your thoughts can suffer too. You may find yourself feeling more grumpy or stressed. Not getting enough sleep also can lead to serious problems, including injury, accidents, anxiety, and depression.  What might cause poor sleeping?  Many things can cause sleep problems, including:  Changes to your sleep schedule.  Stress. Stress can be caused by fear about a single event, such as giving a speech. Or you may have ongoing stress, such as worry about work or school.  Depression, anxiety, and other mental or emotional conditions.  Changes in your sleep habits or surroundings. This includes changes that happen where you sleep, such as noise, light, or sleeping in a different bed. It also includes changes in your sleep pattern, such as having jet lag or working a late shift.  Health problems, such as pain, breathing problems, and restless legs syndrome.  Lack of regular exercise.  Using alcohol, nicotine, or caffeine before bed.  How can you help yourself?  Here are some tips that may help you sleep more soundly and wake up feeling more refreshed.  Your sleeping area   Use your bedroom only for sleeping and sex. A bit of light reading may help you fall asleep. But if it doesn't, do your reading elsewhere in the house. Try not to use your TV, computer, smartphone, or tablet while you are in bed.  Be sure your bed is big enough to stretch out comfortably, especially if you have a sleep partner.  Keep your bedroom quiet, dark, and cool. Use curtains, blinds, or a sleep mask to block out light. To block out noise, use earplugs,  "soothing music, or a \"white noise\" machine.  Your evening and bedtime routine   Create a relaxing bedtime routine. You might want to take a warm shower or bath, or listen to soothing music.  Go to bed at the same time every night. And get up at the same time every morning, even if you feel tired.  What to avoid   Limit caffeine (coffee, tea, caffeinated sodas) during the day, and don't have any for at least 6 hours before bedtime.  Avoid drinking alcohol before bedtime. Alcohol can cause you to wake up more often during the night.  Try not to smoke or use tobacco, especially in the evening. Nicotine can keep you awake.  Limit naps during the day, especially close to bedtime.  Avoid lying in bed awake for too long. If you can't fall asleep or if you wake up in the middle of the night and can't get back to sleep within about 20 minutes, get out of bed and go to another room until you feel sleepy.  Avoid taking medicine right before bed that may keep you awake or make you feel hyper or energized. Your doctor can tell you if your medicine may do this and if you can take it earlier in the day.  If you can't sleep   Imagine yourself in a peaceful, pleasant scene. Focus on the details and feelings of being in a place that is relaxing.  Get up and do a quiet or boring activity until you feel sleepy.  Avoid drinking any liquids before going to bed to help prevent waking up often to use the bathroom.  Where can you learn more?  Go to https://www.[employer].net/patiented  Enter J942 in the search box to learn more about \"Learning About Sleeping Well.\"  Current as of: July 10, 2023  Content Version: 14.2 2024 [email or web address].   Care instructions adapted under license by your healthcare professional. If you have questions about a medical condition or this instruction, always ask your healthcare professional. [employer] disclaims any warranty or liability for your use of this information.    Bladder " Training: Care Instructions  Your Care Instructions     Bladder training is used to treat urge incontinence and stress incontinence. Urge incontinence means that the need to urinate comes on so fast that you can't get to a toilet in time. Stress incontinence means that you leak urine because of pressure on your bladder. For example, it may happen when you laugh, cough, or lift something heavy.  Bladder training can increase how long you can wait before you have to urinate. It can also help your bladder hold more urine. And it can give you better control over the urge to urinate.  It is important to remember that bladder training takes a few weeks to a few months to make a difference. You may not see results right away, but don't give up.  Follow-up care is a key part of your treatment and safety. Be sure to make and go to all appointments, and call your doctor if you are having problems. It's also a good idea to know your test results and keep a list of the medicines you take.  How can you care for yourself at home?  Work with your doctor to come up with a bladder training program that is right for you. You may use one or more of the following methods.  Delayed urination  In the beginning, try to keep from urinating for 5 minutes after you first feel the need to go.  While you wait, take deep, slow breaths to relax. Kegel exercises can also help you delay the need to go to the bathroom.  After some practice, when you can easily wait 5 minutes to urinate, try to wait 10 minutes before you urinate.  Slowly increase the waiting period until you are able to control when you have to urinate.  Scheduled urination  Empty your bladder when you first wake up in the morning.  Schedule times throughout the day when you will urinate.  Start by going to the bathroom every hour, even if you don't need to go.  Slowly increase the time between trips to the bathroom.  When you have found a schedule that works well for you, keep  "doing it.  If you wake up during the night and have to urinate, do it. Apply your schedule to waking hours only.  Kegel exercises  These tighten and strengthen pelvic muscles, which can help you control the flow of urine. (If doing these exercises causes pain, stop doing them and talk with your doctor.) To do Kegel exercises:  Squeeze your muscles as if you were trying not to pass gas. Or squeeze your muscles as if you were stopping the flow of urine. Your belly, legs, and buttocks shouldn't move.  Hold the squeeze for 3 seconds, then relax for 5 to 10 seconds.  Start with 3 seconds, then add 1 second each week until you are able to squeeze for 10 seconds.  Repeat the exercise 10 times a session. Do 3 to 8 sessions a day.  When should you call for help?  Watch closely for changes in your health, and be sure to contact your doctor if:    Your incontinence is getting worse.     You do not get better as expected.   Where can you learn more?  Go to https://www.[employer].net/patiented  Enter V684 in the search box to learn more about \"Bladder Training: Care Instructions.\"  Current as of: November 15, 2023  Content Version: 14.2 2024 [facility]   Care instructions adapted under license by your healthcare professional. If you have questions about a medical condition or this instruction, always ask your healthcare professional. [employer] Inc[employer] disclaims any warranty or liability for your use of this information.       Patient Instructions   Everything looks stable.     Refills of needed medications have been faxed to your pharmacy.     We can get labs in [address] downstairs.   Lab results will be available soon on [employer].    See me in a year, sooner if problems.     "

## 2024-12-14 LAB
CHOLEST SERPL-MCNC: 144 MG/DL
FASTING STATUS PATIENT QL REPORTED: YES
HDLC SERPL-MCNC: 50 MG/DL
LDLC SERPL CALC-MCNC: 64 MG/DL
NONHDLC SERPL-MCNC: 94 MG/DL
TRIGL SERPL-MCNC: 149 MG/DL

## 2025-01-14 ENCOUNTER — OFFICE VISIT (OUTPATIENT)
Dept: UROLOGY | Facility: CLINIC | Age: 89
End: 2025-01-14
Payer: COMMERCIAL

## 2025-01-14 VITALS
DIASTOLIC BLOOD PRESSURE: 70 MMHG | SYSTOLIC BLOOD PRESSURE: 118 MMHG | BODY MASS INDEX: 35.55 KG/M2 | WEIGHT: 240 LBS | HEIGHT: 69 IN

## 2025-01-14 DIAGNOSIS — N32.81 OAB (OVERACTIVE BLADDER): Primary | ICD-10-CM

## 2025-01-14 LAB — RESIDUAL VOLUME (RV) (EXTERNAL): 32

## 2025-01-14 RX ORDER — TAMSULOSIN HYDROCHLORIDE 0.4 MG/1
0.4 CAPSULE ORAL DAILY
Qty: 90 CAPSULE | Refills: 4 | Status: SHIPPED | OUTPATIENT
Start: 2025-01-14

## 2025-01-14 RX ORDER — VIBEGRON 75 MG/1
75 TABLET, FILM COATED ORAL DAILY
Qty: 30 TABLET | Refills: 11 | Status: SHIPPED | OUTPATIENT
Start: 2025-01-14

## 2025-01-14 ASSESSMENT — PAIN SCALES - GENERAL: PAINLEVEL_OUTOF10: MILD PAIN (2)

## 2025-01-14 NOTE — LETTER
1/14/2025       RE: Raul Younger  [address]  [facility] [address]     Dear Colleague,    Thank you for referring your patient, Raul Younger, to the [facility] [place] at [facility]. Please see a copy of my visit note below.      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 He is treated with cryoablation of the prostate in 2010. He has chronic OAB-like Sx.       - Continue Gemtesa.  - Continue daily Flomax 0.4 mg.  - Follow-up in 12 months for PVR, symptom recheck, med refill, PSA.    Thank you for the opportunity to participate in the care of Raul Younger.     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 He is treated with cryoablation of the prostate in 2010 by Dr. Nicolas. Here for follow-up on overactive bladder symptomology. He previously has been on Flomax and finasteride did not feel that it helped his urinary symptoms. He was started on oxybutynin 11-7-2023.  He had excellent control of his urinary symptomology with this medication, he however developed undesirable and intolerable side effects including chapped lips, dry mouth, dry eyes. He has trialed Detrol, but his urinary Sx were not under satisfactory control on this medication. He has been taking Gemtesa and Flomax as of late with good success. He does have constipation with Gemtesa which he manages successfully with senna.     PSA 0.43 10/30/2023     History is obtained from patient & EMR          Past Medical History:     Past Medical  History:   Diagnosis Date     Arthritis      HTN      Malignant neoplasm (H) 01/2011    Prostate cancer     MI, old     vs. asthma per pt.'s wife     Migraine, unspecified, without mention of intractable migraine without mention of status migrainosus     Abstracted 5/15/02.     MIGUEL (obstructive sleep apnea)      Other and unspecified hyperlipidemia     Abstracted 5/15/02.     Other and unspecified hyperlipidemia      Sleep apnea           Past Surgical History:     Past Surgical History:   Procedure Laterality Date     ABDOMEN SURGERY  1980    diaphram repair     COLONOSCOPY  12/29/2011    Diverticuli, 3 mm tissue biopsied, path showed no hyperplastic or adenomatous elements     CYSTOSCOPY       ENT SURGERY       GENITOURINARY SURGERY       HEAD & NECK SURGERY       HERNIORRHAPHY UMBILICAL N/A 9/21/2017    Procedure: HERNIORRHAPHY UMBILICAL;  open incarcerated ventral hernia repair with mesh;  Surgeon: Cristina Robbins MD;  Location: [facility] OR     IRRIGATION AND DEBRIDEMENT HIP, COMBINED Left 11/7/2015    Procedure: COMBINED IRRIGATION AND DEBRIDEMENT HIP;  Surgeon: Stanislav Maharaj MD;  Location: [facility] OR     left pelvis hip repaired (after 8 foot fall)       ORTHOPEDIC SURGERY       PHACOEMULSIFICATION CLEAR CORNEA WITH TORIC INTRAOCULAR LENS IMPLANT  5/23/2012    Procedure:PHACOEMULSIFICATION CLEAR CORNEA WITH TORIC INTRAOCULAR LENS IMPLANT; RIGHT PHACOEMULSIFICATION CLEAR CORNEA WITH  DELUXE TORIC INTRAOCULAR LENS IMPLANT; Surgeon:ADNRA LAGUNAS; Location:[facility]     PHACOEMULSIFICATION CLEAR CORNEA WITH TORIC INTRAOCULAR LENS IMPLANT  5/30/2012    Procedure:PHACOEMULSIFICATION CLEAR CORNEA WITH TORIC INTRAOCULAR LENS IMPLANT; LEFT PHACOEMULSIFICATION CLEAR CORNEA WITH TORIC INTRAOCULAR LENS IMPLANT ; Surgeon:ANDRA LAGUNAS; Location:[facility]     PROSTATE SURGERY  1/2010     adwoa placed for left arm fracture       VITRECTOMY PARSPLANA WITH 23 GAUGE SYSTEM  1/11/2013    Procedure: VITRECTOMY PARSPLANA WITH 23  GAUGE SYSTEM;  LEFT 23 GAUGE VITRECTOMY, EPIRETINAL MEMBRANE REMOVAL, AIR FLUID EXCHANGE ;  Surgeon: Lawson Celeste MD;  Location: [facility]     VITRECTOMY PARSPLANA WITH 23 GAUGE SYSTEM  2014    Procedure: VITRECTOMY PARSPLANA WITH 23 GAUGE SYSTEM;  RIGHT VITRECTOMY PARSPLANA WITH 23 GAUGE SYSTEM, EPIRETINAL MEMBRANE REMOVAL, AIR/FLUID EXCHANGE ;  Surgeon: Lawson Celeste MD;  Location: [facility] NONSPECIFIC PROCEDURE      Paraesophageal Hernia Repair. Abstracted 5/15/02.     [facility] NONSPECIFIC PROCEDURE      Tonsillectomy. Abstracted 5/15/02.          Social History:     Social History     Tobacco Use     Smoking status: Former     Current packs/day: 0.00     Average packs/day: 1 pack/day for 10.0 years (10.0 ttl pk-yrs)     Types: Cigarettes, Pipe     Start date: 1962     Quit date: 1972     Years since quittin.0     Smokeless tobacco: Never   Substance Use Topics     Alcohol use: Yes     Comment: Infrequent wine/beer          Family History:     Family History   Problem Relation Age of Onset     Family History Negative Mother          age 99 after hip fx, was almost 100     Hypertension Mother      Hyperlipidemia Mother      Neurologic Disorder Father          age 70, ALS, laryngeal CA     C.A.D. Father      Heart Disease Father      Hyperlipidemia Father      Hypertension Father      Heart Disease Sister         Born 1931. In hospice care as of 2024     Coronary Artery Disease Sister      Hypertension Sister      Cerebrovascular Disease Sister      Arthritis Sister         Born 1933     Vision Loss Sister      Hypertension Daughter           Allergies:     Allergies   Allergen Reactions     Latex      Area turns red and peels off     Nifedipine      Low grade headache     Procardia [Nifedipine] Other (See Comments)     headache          Medications:     Current Outpatient Medications   Medication Sig Dispense Refill     acetaminophen (TYLENOL) 325 MG tablet Take 2 tablets  (650 mg) by mouth every 4 hours as needed for other (mild pain) 100 tablet 0     amLODIPine (NORVASC) 2.5 MG tablet Take 1 tablet (2.5 mg) by mouth daily. 90 tablet 3     aspirin 81 MG tablet Take 1 tablet by mouth daily.       dextromethorphan-guaiFENesin (MUCINEX DM)  MG 12 hr tablet Take 2 tablets by mouth every 12 hours       docusate calcium (SURFAK) 240 MG capsule Take 240 mg by mouth daily as needed        fexofenadine (ALLEGRA) 60 MG tablet Take 60 mg by mouth 2 times daily       folic acid (FOLVITE) 1 MG tablet Take 1 mg by mouth daily       InFLIXimab (REMICADE IV) Infusion every 7 weeks       methotrexate 2.5 MG tablet Take 25 mg by mouth once a week On Tuesdays. Hold on Tuesday, 11/7/2023. Resume taking on 11/14/2023.       Multiple Vitamins-Minerals (PRESERVISION AREDS PO) Take 1 tablet by mouth every morning        NONFORMULARY 1 capsule 2 times daily Prostate and Urinary Health by [employer]       omega 3 1000 MG CAPS Take 1,000 mg by mouth daily       sennosides (SENOKOT) 8.6 MG tablet Take 1 tablet by mouth daily       simvastatin (ZOCOR) 20 MG tablet Take 1 tablet (20 mg) by mouth at bedtime. 90 tablet 3     tamsulosin (FLOMAX) 0.4 MG capsule Take 1 capsule (0.4 mg) by mouth daily. 90 capsule 2     tolterodine ER (DETROL LA) 2 MG 24 hr capsule Take 1 capsule (2 mg) by mouth daily 30 capsule 0     tolterodine ER (DETROL LA) 2 MG 24 hr capsule Take 1 capsule (2 mg) by mouth daily 90 capsule 0     triamterene-HCTZ (MAXZIDE-25) 37.5-25 MG tablet Take 1 tablet by mouth daily. 90 tablet 3     vibegron (GEMTESA) 75 MG TABS tablet Take 1 tablet (75 mg) by mouth daily 90 tablet 4     Vitamin D3 (CHOLECALCIFEROL) 125 MCG (5000 UT) tablet Take 1 tablet by mouth daily       naproxen sodium (ANAPROX) 220 MG tablet Take by mouth as needed for moderate pain Reported on 3/22/2017 (Patient not taking: Reported on 1/14/2025)       No current facility-administered medications for this visit.          Review of  Systems:    ROS: See HPI for pertinent details.  Remainder of 10-point ROS negative.         Physical Exam:   VS:  T: Data Unavailable    HR: Data Unavailable    BP: 118/70    RR: Data Unavailable     GEN:  Alert.  NAD.   HEENT:  Sclerae anicteric.    CV:  No obvious jugular venous distension.  LUNGS: No respiratory distress, breathing comfortably wo accessory muscle use.  ABD:  ND.     SKIN:  Dry. No visible rashes.   NEURO:  CN grossly intact.          Laboratory Data:     Lab Results   Component Value Date    PSA 0.43 10/30/2023    PSA 0.56 11/05/2021    PSA 0.56 11/07/2019    PSA 0.52 10/18/2018    PSA 0.56 05/31/2018    PSA 0.57 04/19/2018    PSA 0.56 01/19/2018    PSA 0.21 07/10/2017    PSA 0.04 01/09/2017    PSA 0.05 07/11/2016    PSA 0.04 07/16/2015    PSA 0.05 02/19/2015     Lab Results   Component Value Date    CR 1.05 12/13/2024    CR 0.97 12/12/2023    CR 1.09 10/26/2022    CR 1.11 08/12/2021    CR 1.12 08/17/2020    CR 1.04 07/05/2019    CR 0.88 10/08/2018    CR 1.13 05/31/2018    CR 0.88 09/16/2017    CR 0.89 03/22/2017    CR 1.04 11/30/2016    CR 1.02 03/08/2016    CR 0.75 11/12/2015    CR 0.86 11/11/2015     Lab Results   Component Value Date    GFRESTIMATED 69 12/13/2024    GFRESTIMATED 76 12/12/2023    GFRESTIMATED 67 10/26/2022    GFRESTIMATED 61 08/12/2021    GFRESTIMATED 61 08/17/2020    GFRESTIMATED 67 07/05/2019    GFRESTIMATED 83 10/08/2018    GFRESTIMATED 62 05/31/2018    GFRESTIMATED 84 09/16/2017    GFRESTIMATED 82 03/22/2017    GFRESTIMATED 69 11/30/2016    GFRESTIMATED 71 03/08/2016    GFRESTIMATED >90  Non  GFR Calc   11/12/2015    GFRESTIMATED 86 11/11/2015            Again, thank you for allowing me to participate in the care of your patient.      Sincerely,    CHELY Izaguirre CNP

## 2025-01-14 NOTE — PROGRESS NOTES
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 He is treated with cryoablation of the prostate in 2010. He has chronic OAB-like Sx.       - Continue Gemtesa.  - Continue daily Flomax 0.4 mg.  - Follow-up in 12 months for PVR, symptom recheck, med refill, PSA.    Thank you for the opportunity to participate in the care of Raul Younger.     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 He is treated with cryoablation of the prostate in 2010 by Dr. Nicolas. Here for follow-up on overactive bladder symptomology. He previously has been on Flomax and finasteride did not feel that it helped his urinary symptoms. He was started on oxybutynin 11-7-2023.  He had excellent control of his urinary symptomology with this medication, he however developed undesirable and intolerable side effects including chapped lips, dry mouth, dry eyes. He has trialed Detrol, but his urinary Sx were not under satisfactory control on this medication. He has been taking Gemtesa and Flomax as of late with good success. He does have constipation with Gemtesa which he manages successfully with senna.     PSA 0.43 10/30/2023     History is obtained from patient & EMR          Past Medical History:     Past Medical History:   Diagnosis Date    Arthritis     HTN     Malignant neoplasm (H) 01/2011    Prostate cancer    MI, old     vs. asthma per pt.'s wife    Migraine, unspecified, without mention of intractable migraine without mention of status migrainosus     Abstracted 5/15/02.    MIGUEL (obstructive sleep apnea)     Other and  unspecified hyperlipidemia     Abstracted 5/15/02.    Other and unspecified hyperlipidemia     Sleep apnea           Past Surgical History:     Past Surgical History:   Procedure Laterality Date    ABDOMEN SURGERY  1980    diaphram repair    COLONOSCOPY  12/29/2011    Diverticuli, 3 mm tissue biopsied, path showed no hyperplastic or adenomatous elements    CYSTOSCOPY      ENT SURGERY      GENITOURINARY SURGERY      HEAD & NECK SURGERY      HERNIORRHAPHY UMBILICAL N/A 9/21/2017    Procedure: HERNIORRHAPHY UMBILICAL;  open incarcerated ventral hernia repair with mesh;  Surgeon: Cristina Robbins MD;  Location: [facility] OR    IRRIGATION AND DEBRIDEMENT HIP, COMBINED Left 11/7/2015    Procedure: COMBINED IRRIGATION AND DEBRIDEMENT HIP;  Surgeon: Stanislav Maharaj MD;  Location: [facility] OR    left pelvis hip repaired (after 8 foot fall)      ORTHOPEDIC SURGERY      PHACOEMULSIFICATION CLEAR CORNEA WITH TORIC INTRAOCULAR LENS IMPLANT  5/23/2012    Procedure:PHACOEMULSIFICATION CLEAR CORNEA WITH TORIC INTRAOCULAR LENS IMPLANT; RIGHT PHACOEMULSIFICATION CLEAR CORNEA WITH  DELUXE TORIC INTRAOCULAR LENS IMPLANT; Surgeon:ANDRA LAGUNAS; Location:[facility]    PHACOEMULSIFICATION CLEAR CORNEA WITH TORIC INTRAOCULAR LENS IMPLANT  5/30/2012    Procedure:PHACOEMULSIFICATION CLEAR CORNEA WITH TORIC INTRAOCULAR LENS IMPLANT; LEFT PHACOEMULSIFICATION CLEAR CORNEA WITH TORIC INTRAOCULAR LENS IMPLANT ; Surgeon:ANDRA LAGUNAS; Location:[facility]    PROSTATE SURGERY  1/2010    adwoa placed for left arm fracture      VITRECTOMY PARSPLANA WITH 23 GAUGE SYSTEM  1/11/2013    Procedure: VITRECTOMY PARSPLANA WITH 23 GAUGE SYSTEM;  LEFT 23 GAUGE VITRECTOMY, EPIRETINAL MEMBRANE REMOVAL, AIR FLUID EXCHANGE ;  Surgeon: Lawson Celeste MD;  Location: [facility]    VITRECTOMY PARSPLANA WITH 23 GAUGE SYSTEM  2/4/2014    Procedure: VITRECTOMY PARSPLANA WITH 23 GAUGE SYSTEM;  RIGHT VITRECTOMY PARSPLANA WITH 23 GAUGE SYSTEM, EPIRETINAL MEMBRANE REMOVAL, AIR/FLUID  EXCHANGE ;  Surgeon: Lawson Celeste MD;  Location: [facility] NONSPECIFIC PROCEDURE      Paraesophageal Hernia Repair. Abstracted 5/15/02.    [facility] NONSPECIFIC PROCEDURE      Tonsillectomy. Abstracted 5/15/02.          Social History:     Social History     Tobacco Use    Smoking status: Former     Current packs/day: 0.00     Average packs/day: 1 pack/day for 10.0 years (10.0 ttl pk-yrs)     Types: Cigarettes, Pipe     Start date: 1962     Quit date: 1972     Years since quittin.0    Smokeless tobacco: Never   Substance Use Topics    Alcohol use: Yes     Comment: Infrequent wine/beer          Family History:     Family History   Problem Relation Age of Onset    Family History Negative Mother          age 99 after hip fx, was almost 100    Hypertension Mother     Hyperlipidemia Mother     Neurologic Disorder Father          age 70, ALS, laryngeal CA    C.A.D. Father     Heart Disease Father     Hyperlipidemia Father     Hypertension Father     Heart Disease Sister         Born 1929. In hospice care as of 2024    Coronary Artery Disease Sister     Hypertension Sister     Cerebrovascular Disease Sister     Arthritis Sister         Born 1933    Vision Loss Sister     Hypertension Daughter           Allergies:     Allergies   Allergen Reactions    Latex      Area turns red and peels off    Nifedipine      Low grade headache    Procardia [Nifedipine] Other (See Comments)     headache          Medications:     Current Outpatient Medications   Medication Sig Dispense Refill    acetaminophen (TYLENOL) 325 MG tablet Take 2 tablets (650 mg) by mouth every 4 hours as needed for other (mild pain) 100 tablet 0    amLODIPine (NORVASC) 2.5 MG tablet Take 1 tablet (2.5 mg) by mouth daily. 90 tablet 3    aspirin 81 MG tablet Take 1 tablet by mouth daily.      dextromethorphan-guaiFENesin (MUCINEX DM)  MG 12 hr tablet Take 2 tablets by mouth every 12 hours      docusate calcium (SURFAK) 240 MG  capsule Take 240 mg by mouth daily as needed       fexofenadine (ALLEGRA) 60 MG tablet Take 60 mg by mouth 2 times daily      folic acid (FOLVITE) 1 MG tablet Take 1 mg by mouth daily      InFLIXimab (REMICADE IV) Infusion every 7 weeks      methotrexate 2.5 MG tablet Take 25 mg by mouth once a week On Tuesdays. Hold on Tuesday, 11/7/2023. Resume taking on 11/14/2023.      Multiple Vitamins-Minerals (PRESERVISION AREDS PO) Take 1 tablet by mouth every morning       NONFORMULARY 1 capsule 2 times daily Prostate and Urinary Health by [employer]      omega 3 1000 MG CAPS Take 1,000 mg by mouth daily      sennosides (SENOKOT) 8.6 MG tablet Take 1 tablet by mouth daily      simvastatin (ZOCOR) 20 MG tablet Take 1 tablet (20 mg) by mouth at bedtime. 90 tablet 3    tamsulosin (FLOMAX) 0.4 MG capsule Take 1 capsule (0.4 mg) by mouth daily. 90 capsule 2    tolterodine ER (DETROL LA) 2 MG 24 hr capsule Take 1 capsule (2 mg) by mouth daily 30 capsule 0    tolterodine ER (DETROL LA) 2 MG 24 hr capsule Take 1 capsule (2 mg) by mouth daily 90 capsule 0    triamterene-HCTZ (MAXZIDE-25) 37.5-25 MG tablet Take 1 tablet by mouth daily. 90 tablet 3    vibegron (GEMTESA) 75 MG TABS tablet Take 1 tablet (75 mg) by mouth daily 90 tablet 4    Vitamin D3 (CHOLECALCIFEROL) 125 MCG (5000 UT) tablet Take 1 tablet by mouth daily      naproxen sodium (ANAPROX) 220 MG tablet Take by mouth as needed for moderate pain Reported on 3/22/2017 (Patient not taking: Reported on 1/14/2025)       No current facility-administered medications for this visit.          Review of Systems:    ROS: See HPI for pertinent details.  Remainder of 10-point ROS negative.         Physical Exam:   VS:  T: Data Unavailable    HR: Data Unavailable    BP: 118/70    RR: Data Unavailable     GEN:  Alert.  NAD.   HEENT:  Sclerae anicteric.    CV:  No obvious jugular venous distension.  LUNGS: No respiratory distress, breathing comfortably wo accessory muscle use.  ABD:  ND.      SKIN:  Dry. No visible rashes.   NEURO:  CN grossly intact.          Laboratory Data:     Lab Results   Component Value Date    PSA 0.43 10/30/2023    PSA 0.56 11/05/2021    PSA 0.56 11/07/2019    PSA 0.52 10/18/2018    PSA 0.56 05/31/2018    PSA 0.57 04/19/2018    PSA 0.56 01/19/2018    PSA 0.21 07/10/2017    PSA 0.04 01/09/2017    PSA 0.05 07/11/2016    PSA 0.04 07/16/2015    PSA 0.05 02/19/2015     Lab Results   Component Value Date    CR 1.05 12/13/2024    CR 0.97 12/12/2023    CR 1.09 10/26/2022    CR 1.11 08/12/2021    CR 1.12 08/17/2020    CR 1.04 07/05/2019    CR 0.88 10/08/2018    CR 1.13 05/31/2018    CR 0.88 09/16/2017    CR 0.89 03/22/2017    CR 1.04 11/30/2016    CR 1.02 03/08/2016    CR 0.75 11/12/2015    CR 0.86 11/11/2015     Lab Results   Component Value Date    GFRESTIMATED 69 12/13/2024    GFRESTIMATED 76 12/12/2023    GFRESTIMATED 67 10/26/2022    GFRESTIMATED 61 08/12/2021    GFRESTIMATED 61 08/17/2020    GFRESTIMATED 67 07/05/2019    GFRESTIMATED 83 10/08/2018    GFRESTIMATED 62 05/31/2018    GFRESTIMATED 84 09/16/2017    GFRESTIMATED 82 03/22/2017    GFRESTIMATED 69 11/30/2016    GFRESTIMATED 71 03/08/2016    GFRESTIMATED >90  Non  GFR Calc   11/12/2015    GFRESTIMATED 86 11/11/2015

## 2025-01-14 NOTE — NURSING NOTE
Chief Complaint   Patient presents with    Prostate Cancer    OAB     Pt denies UTI symptoms or gross hematuria.  Pt reports Gemtesa has help his urinary symptoms but is expensive    PVR: 32 mL by bladder scan      Asia Pérez, Clinic Assistant

## 2025-01-30 ENCOUNTER — TELEPHONE (OUTPATIENT)
Dept: UROLOGY | Facility: CLINIC | Age: 89
End: 2025-01-30
Payer: COMMERCIAL

## 2025-01-30 DIAGNOSIS — N32.81 OAB (OVERACTIVE BLADDER): Primary | ICD-10-CM

## 2025-01-30 RX ORDER — TROSPIUM CHLORIDE 20 MG/1
20 TABLET, FILM COATED ORAL
Qty: 60 TABLET | Refills: 11 | Status: SHIPPED | OUTPATIENT
Start: 2025-01-30

## 2025-01-30 NOTE — TELEPHONE ENCOUNTER
[facility] Call Center    Phone Message    May a detailed message be left on voicemail: Yes    Reason for Call: Other: Patient called and stated that Gemtesa increased there price and patient was wondering if there was an alternative. Please call patient back to discuss.     Action Taken: Other: [facility]    Travel Screening: Not Applicable     Date of Service:

## 2025-02-13 ENCOUNTER — OFFICE VISIT (OUTPATIENT)
Dept: INTERNAL MEDICINE | Facility: CLINIC | Age: 89
End: 2025-02-13
Payer: COMMERCIAL

## 2025-02-13 VITALS
TEMPERATURE: 97.6 F | HEIGHT: 69 IN | HEART RATE: 64 BPM | RESPIRATION RATE: 16 BRPM | DIASTOLIC BLOOD PRESSURE: 72 MMHG | WEIGHT: 240 LBS | OXYGEN SATURATION: 95 % | BODY MASS INDEX: 35.55 KG/M2 | SYSTOLIC BLOOD PRESSURE: 132 MMHG

## 2025-02-13 DIAGNOSIS — R26.81 GAIT INSTABILITY: ICD-10-CM

## 2025-02-13 DIAGNOSIS — R40.0 HAS DAYTIME DROWSINESS: ICD-10-CM

## 2025-02-13 DIAGNOSIS — I10 ESSENTIAL HYPERTENSION, BENIGN: Primary | ICD-10-CM

## 2025-02-13 DIAGNOSIS — C61 PROSTATE CANCER (H): ICD-10-CM

## 2025-02-13 DIAGNOSIS — G47.33 OSA (OBSTRUCTIVE SLEEP APNEA): ICD-10-CM

## 2025-02-13 DIAGNOSIS — E66.01 MORBID OBESITY (H): ICD-10-CM

## 2025-02-13 PROCEDURE — G2211 COMPLEX E/M VISIT ADD ON: HCPCS | Performed by: INTERNAL MEDICINE

## 2025-02-13 PROCEDURE — 99214 OFFICE O/P EST MOD 30 MIN: CPT | Performed by: INTERNAL MEDICINE

## 2025-02-13 ASSESSMENT — ENCOUNTER SYMPTOMS: HYPERTENSION: 1

## 2025-02-13 NOTE — PROGRESS NOTES
"Face to face time with patient and wife: 25 minutes (1200---1225)  Time spent in chart review/note documentation date of visit: >5 min    Assessment & Plan   (I10) Essential hypertension, benign  (primary encounter diagnosis)  Comment: BP a bit high. Raise dose of amlodipine.     (R26.81) Gait instability  Comment: Offered Physical Therapy referral.   Plan: Physical Therapy  Referral          (G47.33) MIGUEL (obstructive sleep apnea)  (R40.0) Has daytime drowsiness  Comment: Letter provided as requested by patient.    (E66.01) Morbid obesity criteria (H) [E66.01]: BMI >35 with MIGUEL,HTN, lipids  Comment: Discussed that weight reduction may benefit BP, MIGUEL and lipids.     (C61) Prostate cancer (H)  Comment: Follow with Urology as they advise.       Patient Instructions   Order placed for Physical Therapy for \"gait evaluation\"/consideration of walker or other device.     Will document your use of home CPAP and presumed benefit/effectiveness.    Agree with increasing amlodipine back up to TWO tablets daily.     Okay use benadryl as needed.     See me in 6-12 months. Next Annual Wellness Exam due in mid-December 2025.     Subjective   Raul is a 89 year old, presenting for the following health issues:  Follow Up and Hypertension      2/13/2025    10:47 AM   Additional Questions   Roomed by Angie GARCIA CMA     Hypertension     History of Present Illness       Hypertension: He presents for follow up of hypertension.  He does not check blood pressure  regularly outside of the clinic. Outside blood pressures have been over 140/90. He follows a low salt diet.     Reason for visit:  Bp hi  sleep rrev  sinus drainage  misc questions    He eats 0-1 servings of fruits and vegetables daily.He consumes 1 sweetened beverage(s) daily.He exercises with enough effort to increase his heart rate 9 or less minutes per day.  He exercises with enough effort to increase his heart rate 3 or less days per week.   He is taking medications " "regularly.     The patient is using his CPAP regularly for obstructive sleep apnea. He requests a letter from his provider documenting that he is using his CPAP regularly with benefit to his health.     Home BP's sometimes run in the 170-190 range systolic.     His weight meets criteria for morbid obesity, and we discussed that weight reduction could benefit his BP and treatment of sleep apnea.     The patient had a cough a couple of weeks ago that is improving.     He has chronic gait instability, no falls in the past couple of months.     Past medical, family and social histories as well as medications reviewed and updated as needed.    REVIEW OF SYSTEMS: The following systems have been completely reviewed and are negative except as noted above:   Constitutional, HEENT, respiratory, cardiovascular, genitourinary, musculoskeletal, hematologic, and neurologic systems.        Objective    Vitals: /72   Pulse 64   Temp 97.6  F (36.4  C) (Tympanic)   Resp 16   Ht 1.753 m (5' 9\")   Wt 108.9 kg (240 lb)   SpO2 95%   BMI 35.44 kg/m    BMI= Body mass index is 35.44 kg/m .    Physical Exam   GENERAL: alert and no distress  EYES: Eyes grossly normal to inspection, PERRL and conjunctivae and sclerae normal  RESP: lungs clear to auscultation - no rales, rhonchi or wheezes  CV: regular rate and rhythm, normal S1 S2, no S3 or S4, no murmur, click or rub, no peripheral edema  MS: no gross musculoskeletal defects noted, no edema  NEURO: sensory exam grossly normal, mentation intact, and gait a bit unstable.            Signed Electronically by: Jose D Vasquez MD,         The longitudinal plan of care for the diagnosis(es)/condition(s) as documented were addressed during this visit. Due to the added complexity in care, I will continue to support Raul in the subsequent management and with ongoing continuity of care.    "

## 2025-02-13 NOTE — LETTER
2/27/2025    Regarding:  Raul Younger  [address] [facility] [address] [place] [id number]           To whom it may concern:     Raul Younger has obstructive sleep apnea. He has been faithfully using his CPAP device which has provided clear benefit to his health.     If you have any further questions or need for clarification, please contact our office.    Sincerely,        Jose D Vasquez MD      Electronically signed

## 2025-02-13 NOTE — PATIENT INSTRUCTIONS
"Order placed for Physical Therapy for \"gait evaluation\"/consideration of walker or other device.     Will document your use of home CPAP and presumed benefit/effectiveness.    Agree with increasing amlodipine back up to TWO tablets daily.     Okay use benadryl as needed.     See me in 6-12 months. Next Annual Wellness Exam due in mid-December 2025.   "

## 2025-02-17 ENCOUNTER — TELEPHONE (OUTPATIENT)
Dept: INTERNAL MEDICINE | Facility: CLINIC | Age: 89
End: 2025-02-17
Payer: COMMERCIAL

## 2025-02-17 NOTE — TELEPHONE ENCOUNTER
"  General Call ATTENTION DR. STEWART      Reason for Call: Patient calliing, \"regarding visit last Thursday. Insurance co. Asks that there be a statement that you tell me to use the c-pap daily, please modify your notes and needs signature for those notes from Jose D Stewart\"     What are your questions or concerns: See above     Date of last appointment with provider: With Jose D Stewart last Thursday 2/13    Could we send this information to you in Vungle or would you prefer to receive a phone call?:   Patient would prefer a phone call   Okay to leave a detailed message?: Yes at Cell number on file:    Telephone Information:   Mobile Upload to my chart please        "

## 2025-02-19 NOTE — TELEPHONE ENCOUNTER
My note for this visit has not yet been completed. When I do complete this note, I will document that he needs CPAP and uses it daily.

## 2025-02-19 NOTE — TELEPHONE ENCOUNTER
Called patient and relayed message from provider.  Patient verbalized understanding and appreciation and thanks provider for assistance.  Stated he needs this completed by 3/6/25.

## 2025-02-27 ENCOUNTER — TELEPHONE (OUTPATIENT)
Dept: INTERNAL MEDICINE | Facility: CLINIC | Age: 89
End: 2025-02-27
Payer: COMMERCIAL

## 2025-02-28 NOTE — TELEPHONE ENCOUNTER
Letter created for patient to document his use of CPAP.   Please call him and see whether this should be mailed to him.

## 2025-02-28 NOTE — TELEPHONE ENCOUNTER
"Call to patient who states he would like the letter faxed to [facility] where he got his CPAP supply.   Fax # [phone]    Patient would also like a copy of a letter mailed to him for future reference.    Patient's wife states clinic can call [facility] location to let them know letter was sent. Writer unable to see letter in patient's chart.    Per telephone encounter from 02/17/2025 titled \"Order\" - looks like provider was going to update appointment note to document that CPAP is used daily.    Dr. Vasquez, is the letter in your office or can you produce a letter in patient's chart so clinic staff can print out letter to fax to [facility]?    Thank you,  [place], Triage [facility]    8:03 AM 2/28/2025         "

## 2025-03-03 ENCOUNTER — THERAPY VISIT (OUTPATIENT)
Dept: PHYSICAL THERAPY | Facility: CLINIC | Age: 89
End: 2025-03-03
Attending: INTERNAL MEDICINE
Payer: COMMERCIAL

## 2025-03-03 DIAGNOSIS — R26.81 GAIT INSTABILITY: ICD-10-CM

## 2025-03-03 PROCEDURE — 97112 NEUROMUSCULAR REEDUCATION: CPT | Mod: GP | Performed by: PHYSICAL THERAPIST

## 2025-03-03 PROCEDURE — 97161 PT EVAL LOW COMPLEX 20 MIN: CPT | Mod: GP | Performed by: PHYSICAL THERAPIST

## 2025-03-03 NOTE — PROGRESS NOTES
PHYSICAL THERAPY EVALUATION  Type of Visit: Evaluation        Fall Risk Screen:  Fall screen completed by: PT  Have you fallen 2 or more times in the past year?: No  Have you fallen and had an injury in the past year?: No  Is patient a fall risk?: Yes  Fall screen comments: Unsteady    Subjective      Condition type:  Chronic (continuous duration <3 months)  Cause of current episode:  Unspecified     Nature of treatment:  Rehabilitative  Functional ability:  Moderate functional limitations  Documented POC (choose all that apply):  Measurable short and long term/discharge treatment goals related to physical and functional deficits.;Frequency of treatment visits and treatment activities to address deficit areas.;Patient agrees to program participation including home program  Briefly describe symptoms:  Increased instability over the last 6 months  How did the symptoms start:  Gradually  Average pain/intensity last 24 hours:  3/10  Average pain/intensity past week:  3/10  Frequency of Symptoms:  Frequently (51-75% of the time)  Symptom impact on ADLs:  Quite a bit  Condition change since eval:  N/A (first visit)  General health reported by patient:  Good      Presenting condition or subjective complaint: balance  Patient notes he has started feeling more unstable over the last 6 months, no change in activity or change. He denies falls but had a near fall with stabilization against a cabinet about 2 weeks ago - after a quick turn. He denies dizziness or instability at certain times day.   Date of onset: 09/03/24 (About 6 months ago per report)    Relevant medical history: Arthritis; Bladder or bowel problems; High blood pressure; Incontinence; Migraines or headaches; Overweight; Rheumatoid arthritis; Vision problems   Past Medical History:   Diagnosis Date    Arthritis     HTN     Malignant neoplasm (H) 01/2011    Prostate cancer    MI, old     vs. asthma per pt.'s wife    Migraine, unspecified, without mention of  intractable migraine without mention of status migrainosus     Abstracted 5/15/02.    MIGUEL (obstructive sleep apnea)     Other and unspecified hyperlipidemia     Abstracted 5/15/02.    Other and unspecified hyperlipidemia     Sleep apnea      Dates & types of surgery: prostratr cancer [id number],   Past Surgical History:   Procedure Laterality Date    ABDOMEN SURGERY  1980    diaphram repair    COLONOSCOPY  12/29/2011    Diverticuli, 3 mm tissue biopsied, path showed no hyperplastic or adenomatous elements    CYSTOSCOPY      ENT SURGERY      GENITOURINARY SURGERY      HEAD & NECK SURGERY      HERNIORRHAPHY UMBILICAL N/A 9/21/2017    Procedure: HERNIORRHAPHY UMBILICAL;  open incarcerated ventral hernia repair with mesh;  Surgeon: Cristina Robbins MD;  Location: [facility] OR    IRRIGATION AND DEBRIDEMENT HIP, COMBINED Left 11/7/2015    Procedure: COMBINED IRRIGATION AND DEBRIDEMENT HIP;  Surgeon: Stanislav Maharaj MD;  Location: [facility] OR    left pelvis hip repaired (after 8 foot fall)      ORTHOPEDIC SURGERY      PHACOEMULSIFICATION CLEAR CORNEA WITH TORIC INTRAOCULAR LENS IMPLANT  5/23/2012    Procedure:PHACOEMULSIFICATION CLEAR CORNEA WITH TORIC INTRAOCULAR LENS IMPLANT; RIGHT PHACOEMULSIFICATION CLEAR CORNEA WITH  DELUXE TORIC INTRAOCULAR LENS IMPLANT; Surgeon:ANDRA LAGUNAS; Location:[facility]    PHACOEMULSIFICATION CLEAR CORNEA WITH TORIC INTRAOCULAR LENS IMPLANT  5/30/2012    Procedure:PHACOEMULSIFICATION CLEAR CORNEA WITH TORIC INTRAOCULAR LENS IMPLANT; LEFT PHACOEMULSIFICATION CLEAR CORNEA WITH TORIC INTRAOCULAR LENS IMPLANT ; Surgeon:ANDRA LAGUNAS; Location:[facility]    PROSTATE SURGERY  1/2010    adwoa placed for left arm fracture      VITRECTOMY PARSPLANA WITH 23 GAUGE SYSTEM  1/11/2013    Procedure: VITRECTOMY PARSPLANA WITH 23 GAUGE SYSTEM;  LEFT 23 GAUGE VITRECTOMY, EPIRETINAL MEMBRANE REMOVAL, AIR FLUID EXCHANGE ;  Surgeon: Lawson Celeste MD;  Location: [facility]    VITRECTOMY PARSPLANA WITH 23 GAUGE SYSTEM   2/4/2014    Procedure: VITRECTOMY PARSPLANA WITH 23 GAUGE SYSTEM;  RIGHT VITRECTOMY PARSPLANA WITH 23 GAUGE SYSTEM, EPIRETINAL MEMBRANE REMOVAL, AIR/FLUID EXCHANGE ;  Surgeon: Lawson Celeste MD;  Location: [facility] NONSPECIFIC PROCEDURE  1996    Paraesophageal Hernia Repair. Abstracted 5/15/02.    [facility] NONSPECIFIC PROCEDURE  1943    Tonsillectomy. Abstracted 5/15/02.       Prior diagnostic imaging/testing results:       Prior therapy history for the same diagnosis, illness or injury: No      Prior Level of Function  Transfers: Independent  Ambulation: Independent  ADL: Independent  IADL: Driving, Finances, Housekeeping, Laundry, Meal preparation, Medication management    Living Environment  Social support: With a significant other or spouse   Type of home: Apartment/condo; Assisted Living; 1 level   Stairs to enter the home: No       Ramp: No   Stairs inside the home: No       Help at home: None  Equipment owned: Walker; Grab bars; Lift chair     Employment: No, retired   Hobbies/Interests:  No formal exercise, 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 Ester gets 2 meals per day - Raul will sometimes cook or share with her, Raul does laundry - in unit. Watch TV, used to play tennis, thinks exercise machines are relatively boring but he has some treadmills, and stat bikes, and weight machines at his condo.    Patient goals for therapy: turn corners    Pain assessment: Pain present  Location: L hip (history of repair after a fall off a ladder)/Rating: Currently: 0/10, At worst: Unable to formally rate - depends on the day, worse with crossing his left leg over his right or standing and pivoting - has to pay $35/month     Objective   Vitals Signs  Blood Pressure: 141/87  Vital Signs Comments: Seated, resting, left  arm    STRENGTH: Impaired functional strength with observation of mobility, not formally assessed    BED MOBILITY: Denies dizziness    TRANSFERS: Able to stand without UE assist but fatiguing with repetition    GAIT:   Ambulates with decreased gait speed, moderate path deviation, decreased right arm swing, increased instability with head movements    BALANCE: Impaired    SPECIAL TESTS  Functional Gait Assessment (FGA) TOTAL SCORE: (MAXIMUM SCORE 30): 15    10 Meter Walk Test (Comfortable)  No AD  0.87 m/s   10 Meter Walk Test (Fast)  No AD  1.28 m/s   6 Minute Walk Test (6MWT)           Fan Balance Scale (BBS)     5 Times Sit-to-Stand (5TSTS)  13.77 sec     Dynamic Gait Index (DGI)     Timed Up and Go (TUG) - sec    Single Leg Stance Right (sec)    Single Leg Stance Left (sec)    Modified CTSIB Conditions (sec) Cond 1: 30  Cond 2: 30  Cond 4: 30  Cond 5 : 9   Romberg  (sec)    Sharpened Romberg (sec)    30 Second Sit to Stand (reps/height)    Mini-BESTest              SENSATION: No n/t per patient report    Assessment & Plan   CLINICAL IMPRESSIONS  Medical Diagnosis: Gait instability (R26.81    Treatment Diagnosis: Sensory selection and weighting deficit, force production deficit   Impression/Assessment: Patient is a 89 year old male with balance and functional mobility complaints.  The following significant findings have been identified: Pain, Decreased strength, Impaired balance, Decreased activity tolerance, and Disequilibrium . These impairments interfere with their ability to perform self care tasks, recreational activities, household chores, household mobility, and community mobility as compared to previous level of function.     Clinical Decision Making (Complexity):  Clinical Presentation: Stable/Uncomplicated  Clinical Presentation Rationale: based on medical and personal factors listed in PT evaluation  Clinical Decision Making (Complexity): Low complexity    PLAN OF CARE  Treatment  Interventions:  Interventions: Gait Training, Manual Therapy, Neuromuscular Re-education, Therapeutic Activity, Therapeutic Exercise, Self-Care/Home Management, Canalith Repositioning    Long Term Goals     PT Goal 1  Goal Identifier: HEP  Goal Description: Patient will demonstrate understanding and compliance to her HEP at least 3x per week for continued wellbeing upon discharge from skilled physical therapy.  Rationale: to maximize safety and independence with performance of ADLs and functional tasks;to maximize safety and independence within the home;to maximize safety and independence within the community;to maximize safety and independence with self cares  Target Date: 04/28/25  PT Goal 2  Goal Identifier: 5x STS  Goal Description: Patient will complete the 5x STS test in </=12.5 seconds to demonstrate improved functional LE strength and decreased risk for falls for safety and independence with transfers.  Rationale: to maximize safety and independence with performance of ADLs and functional tasks;to maximize safety and independence within the home;to maximize safety and independence within the community;to maximize safety and independence with self cares  Goal Progress: Eval: 13.77 sec  Target Date: 04/28/25  PT Goal 3  Goal Identifier: FGA  Goal Description: Patient will complete the FGA with a score of 22/30 to demonstrate improved balance and decreased risk for falls.  Rationale: to maximize safety and independence with performance of ADLs and functional tasks;to maximize safety and independence within the home;to maximize safety and independence within the community;to maximize safety and independence with self cares  Goal Progress: Eval: 15/30  Target Date: 04/28/25  PT Goal 4  Goal Identifier: Gait speed  Goal Description: Patient will ambulate at a normal speed of >1.0 m/s with the least restrictive AD or no AD to demonstrate improved step length and gait speed for increased safety and independence with  crossing the street.  Rationale: to maximize safety and independence within the home;to maximize safety and independence within the community;to maximize safety and independence with transportation  Goal Progress: 20 foot distance with no AD; Eval: Normal: 0.87 m/s, Fast:1.28 m/s  Target Date: 04/28/25  PT Goal 5  Goal Identifier: Turns  Goal Description: Patient will demonstrate and report ability to negotiate turns and obstacles without LOB for increased safety and indepedence with tight space negotiation in his home.  Rationale: to maximize safety and independence with performance of ADLs and functional tasks;to maximize safety and independence within the home;to maximize safety and independence within the community;to maximize safety and independence with self cares  Goal Progress: Eval: Instability during turns, especially around a corner or obstacle  Target Date: 04/28/25      Frequency of Treatment: 1x per week, decreasing in frequency as indicated  Duration of Treatment: 10 8 weeks    Recommended Referrals to Other Professionals:   Education Assessment:   Learner/Method: Patient;Demonstration;Pictures/Video  Education Comments: Exam findings, POC, HEP    Risks and benefits of evaluation/treatment have been explained.   Patient/Family/caregiver agrees with Plan of Care.     Evaluation Time:     PT Armida, Low Complexity Minutes (14377): 31       Signing Clinician: Anna Marie Rodriguez, PT        [facility]                                                                                   OUTPATIENT PHYSICAL THERAPY      PLAN OF TREATMENT FOR OUTPATIENT REHABILITATION   Patient's Last Name, First Name, Raul Block YOB: 1935   Provider's Name   [facility]   Medical Record No.  [id number]     Onset Date: 09/03/24 (About 6 months ago per report)  Start of Care Date: 03/03/25     Medical Diagnosis:  Gait instability (R26.81      PT  Treatment Diagnosis:  Sensory selection and weighting deficit, force production deficit Plan of Treatment  Frequency/Duration: 1x per week, decreasing in frequency as indicated/ 10 8 weeks    Certification date from 03/03/25 to 04/28/25         See note for plan of treatment details and functional goals     Anna Marie Rodriguez, PT                         I CERTIFY THE NEED FOR THESE SERVICES FURNISHED UNDER        THIS PLAN OF TREATMENT AND WHILE UNDER MY CARE     (Physician attestation of this document indicates review and certification of the therapy plan).              Referring Provider:  Jose D Vasquez    Initial Assessment  See Epic Evaluation- Start of Care Date: 03/03/25

## 2025-03-03 NOTE — TELEPHONE ENCOUNTER
"Letter is dated 2/27/2025, and is the most recently created letter.   It was attached to the encounter dated 2/13/2025.   Attempted to print a copy from home--printed at \"default printer\"--unsure which one.     Please print and forward to parties requested by the patient.   "

## 2025-03-03 NOTE — TELEPHONE ENCOUNTER
Printed letter from 02/27/2025 and faxed to [facility] in [facility]. Placed a copy in the mail to be sent to patient per his request.    Thank you,  [place], Triage RN Vincent Cm    10:38 AM 3/3/2025

## 2025-03-10 ENCOUNTER — THERAPY VISIT (OUTPATIENT)
Dept: PHYSICAL THERAPY | Facility: CLINIC | Age: 89
End: 2025-03-10
Attending: INTERNAL MEDICINE
Payer: COMMERCIAL

## 2025-03-10 DIAGNOSIS — R26.81 GAIT INSTABILITY: Primary | ICD-10-CM

## 2025-03-10 PROCEDURE — 97110 THERAPEUTIC EXERCISES: CPT | Mod: GP | Performed by: PHYSICAL THERAPIST

## 2025-03-10 PROCEDURE — 97140 MANUAL THERAPY 1/> REGIONS: CPT | Mod: GP | Performed by: PHYSICAL THERAPIST

## 2025-03-17 ENCOUNTER — THERAPY VISIT (OUTPATIENT)
Dept: PHYSICAL THERAPY | Facility: CLINIC | Age: 89
End: 2025-03-17
Attending: INTERNAL MEDICINE
Payer: COMMERCIAL

## 2025-03-17 DIAGNOSIS — R26.81 GAIT INSTABILITY: Primary | ICD-10-CM

## 2025-03-17 PROCEDURE — 97110 THERAPEUTIC EXERCISES: CPT | Mod: GP | Performed by: PHYSICAL THERAPIST

## 2025-03-17 PROCEDURE — 97112 NEUROMUSCULAR REEDUCATION: CPT | Mod: GP | Performed by: PHYSICAL THERAPIST

## 2025-03-24 ENCOUNTER — THERAPY VISIT (OUTPATIENT)
Dept: PHYSICAL THERAPY | Facility: CLINIC | Age: 89
End: 2025-03-24
Attending: INTERNAL MEDICINE
Payer: COMMERCIAL

## 2025-03-24 DIAGNOSIS — R26.81 GAIT INSTABILITY: Primary | ICD-10-CM

## 2025-03-24 PROCEDURE — 97110 THERAPEUTIC EXERCISES: CPT | Mod: GP | Performed by: PHYSICAL THERAPIST

## 2025-03-26 ENCOUNTER — HOSPITAL ENCOUNTER (EMERGENCY)
Facility: CLINIC | Age: 89
Discharge: HOME | End: 2025-03-26
Attending: BEHAVIOR TECHNICIAN
Payer: COMMERCIAL

## 2025-03-26 VITALS
WEIGHT: 244.93 LBS | HEART RATE: 67 BPM | OXYGEN SATURATION: 97 % | DIASTOLIC BLOOD PRESSURE: 99 MMHG | TEMPERATURE: 98.5 F | RESPIRATION RATE: 20 BRPM | HEIGHT: 66 IN | BODY MASS INDEX: 39.36 KG/M2 | SYSTOLIC BLOOD PRESSURE: 127 MMHG

## 2025-03-26 DIAGNOSIS — S61.211A LACERATION OF LEFT INDEX FINGER WITHOUT FOREIGN BODY WITHOUT DAMAGE TO NAIL, INITIAL ENCOUNTER: ICD-10-CM

## 2025-03-26 PROCEDURE — 12001 RPR S/N/AX/GEN/TRNK 2.5CM/<: CPT | Mod: F1

## 2025-03-26 PROCEDURE — 99283 EMERGENCY DEPT VISIT LOW MDM: CPT | Mod: 25

## 2025-03-26 ASSESSMENT — ACTIVITIES OF DAILY LIVING (ADL)
ADLS_ACUITY_SCORE: 41
ADLS_ACUITY_SCORE: 41

## 2025-03-26 ASSESSMENT — COLUMBIA-SUICIDE SEVERITY RATING SCALE - C-SSRS
2. HAVE YOU ACTUALLY HAD ANY THOUGHTS OF KILLING YOURSELF IN THE PAST MONTH?: NO
6. HAVE YOU EVER DONE ANYTHING, STARTED TO DO ANYTHING, OR PREPARED TO DO ANYTHING TO END YOUR LIFE?: NO
1. IN THE PAST MONTH, HAVE YOU WISHED YOU WERE DEAD OR WISHED YOU COULD GO TO SLEEP AND NOT WAKE UP?: NO

## 2025-03-26 NOTE — ED PROVIDER NOTES
"  Emergency Department Note      History of Present Illness     Chief Complaint   Laceration      HPI   Raul Younger is a 89 year old male with a history of hypertension, hyperlipidemia, myocardial infarction, prostate cancer, and obesity who presents to the ED today for evaluation of a laceration. The patient reports he was cutting rolls with a kitchen knife about 1.5 hours ago and accidentally cut his left index finger. He reports his finger was bleeding significantly after the incident, so he put a bandage on it. At presentation, he states his finger is throbbing, but it's not numb or tingling. He is not anticoagulated.     Independent Historian   None    Review of External Notes   ***    Past Medical History     Medical History and Problem List   Arthritis  Hypertension  Myocardial infarction  Migraine  MIGUEL  Hyperlipidemia  Benign neoplasm of pharynx  BPH  Deviated nasal septum  SNHL bilateral  Tinnitus  Obesity  Prostate cancer    Medications   Amlodipine  Infliximab   Methotrexate  Naproxen sodium  Simvastatin  Tamsulosin  Triamterene  Trospium  Vibegron   Maxzide   Aspirin 81 mg  Gemtesa     Surgical History   Diaphragm repair  Cystoscopy  ENT surgery  Genitourinary surgery  Head and neck surgery  Herniorrhaphy umbilical  I&D of hip  Let pelvis repair  Orthopedic surgery  Phacoemulsification clear cornea with toric intraocular lens implant x2  Prostate surgery  Vitrectomy parsplana x2  Paraesophageal hernia repair   Tonsillectomy     Physical Exam     Patient Vitals for the past 24 hrs:   BP Temp Pulse Resp SpO2 Height Weight   03/26/25 1804 (!) 127/99 98.5  F (36.9  C) 67 20 97 % 1.676 m (5' 6\") 111.1 kg (244 lb 14.9 oz)     Physical Exam  Physical Exam:  General: lying comfortably on hospital bed  Head: normocephalic, atraumatic  Eyes: PERRLA, EOMI  Ears: External ears appear normal.   Nose: no signs of bleeding   Throat: moist mucous membranes  Neck: No JVD  CV: regular rate and rhythm  Pulm: lungs " "clear to ausculation bilaterally, normal respiratory effort, normal chest expansion with breathing   Abdomen: soft, non-tender, non-distended  MSK: No midline tenderness  Ext: normal range of motion of all extremities. No gross deformities  Skin: warm, dry, no rashes  Neuro: alert and oriented  Psych: Appropriate mood. Cooperative    Diagnostics     Lab Results   Labs Ordered and Resulted from Time of ED Arrival to Time of ED Departure - No data to display    Imaging   No orders to display     Independent Interpretation   {IndependentReview:910625::\"None\"}    ED Course      Medications Administered   Medications - No data to display    Procedures     Laceration Repair      Procedure: Laceration Repair    Indication: Laceration    Consent: Verbal    Tetanus status reviewed. Updated 10/9/2018    Location: Left L second (index) finger    Length: 2 cm    Preparation: Irrigation with Sterile Saline.    Anesthesia/Sedation: Bupivacaine - 0.5%      Treatment/Exploration: Wound explored, no foreign bodies found     Closure: The wound was closed with one layer. Skin/superficial layer was closed with 4 x 5-0 Nylon using Interrupted sutures.     Patient Status: The patient tolerated the procedure well: Yes. There were no complications.    Discussion of Management   None    ED Course   ED Course as of 03/26/25 1930   Wed Mar 26, 2025   1852 I obtained history and examined the patient as noted above.    1919 I performed the laceration repair procedure as noted above.        Additional Documentation  None    Medical Decision Making / Diagnosis     CMS Diagnoses: None    [facility]       None    [facility]   Raul Younger is a 89 year old male ***    Disposition   The patient was discharged.     Diagnosis   No diagnosis found.     Discharge Medications   New Prescriptions    No medications on file         Scribe Disclosure:  I, Marie Lagos, am serving as a scribe at 7:08 PM on 3/26/2025 to document services personally performed by Jasiel, " Jose DE LEÓN PA-C based on my observations and the provider's statements to me.

## 2025-03-26 NOTE — ED TRIAGE NOTES
Pt arrives from home he sliced his left first finger while slicing a dinner roll      Triage Assessment (Adult)       Row Name 03/26/25 9067          Triage Assessment    Airway WDL WDL        Respiratory WDL    Respiratory WDL WDL        Skin Circulation/Temperature WDL    Skin Circulation/Temperature WDL WDL        Cardiac WDL    Cardiac WDL WDL        Peripheral/Neurovascular WDL    Peripheral Neurovascular WDL WDL        Cognitive/Neuro/Behavioral WDL    Cognitive/Neuro/Behavioral WDL WDL

## 2025-04-01 ENCOUNTER — THERAPY VISIT (OUTPATIENT)
Dept: PHYSICAL THERAPY | Facility: CLINIC | Age: 89
End: 2025-04-01
Attending: INTERNAL MEDICINE
Payer: COMMERCIAL

## 2025-04-01 DIAGNOSIS — R26.81 GAIT INSTABILITY: Primary | ICD-10-CM

## 2025-04-01 PROCEDURE — 97110 THERAPEUTIC EXERCISES: CPT | Mod: GP | Performed by: PHYSICAL THERAPIST

## 2025-04-07 ENCOUNTER — THERAPY VISIT (OUTPATIENT)
Dept: PHYSICAL THERAPY | Facility: CLINIC | Age: 89
End: 2025-04-07
Attending: INTERNAL MEDICINE
Payer: COMMERCIAL

## 2025-04-07 ENCOUNTER — ALLIED HEALTH/NURSE VISIT (OUTPATIENT)
Dept: NURSING | Facility: CLINIC | Age: 89
End: 2025-04-07
Payer: COMMERCIAL

## 2025-04-07 DIAGNOSIS — R26.81 GAIT INSTABILITY: Primary | ICD-10-CM

## 2025-04-07 DIAGNOSIS — Z48.02 VISIT FOR SUTURE REMOVAL: Primary | ICD-10-CM

## 2025-04-07 PROCEDURE — 97110 THERAPEUTIC EXERCISES: CPT | Mod: GP | Performed by: PHYSICAL THERAPIST

## 2025-04-07 PROCEDURE — 97112 NEUROMUSCULAR REEDUCATION: CPT | Mod: GP | Performed by: PHYSICAL THERAPIST

## 2025-04-07 PROCEDURE — 99207 PR NO CHARGE NURSE ONLY: CPT

## 2025-04-07 NOTE — PROGRESS NOTES
Raul TANJA Younger presents to the clinic today for removal of sutures.  The patient has had the sutures in place for 12 days.  There has been no history of infection or drainage.  2 sutures are seen located on the left index finger. Patient states 2 suture popped out on their own already. RN confirmed there are no remaining sutures or sutures under the scab.  The wound is healing well with no signs of infection.  Tetanus status is up to date.   All sutures were easily removed today.  Routine wound care discussed.  The patient will follow up as needed.

## 2025-04-12 ENCOUNTER — HEALTH MAINTENANCE LETTER (OUTPATIENT)
Age: 89
End: 2025-04-12

## 2025-05-12 ENCOUNTER — TRANSFERRED RECORDS (OUTPATIENT)
Dept: HEALTH INFORMATION MANAGEMENT | Facility: CLINIC | Age: 89
End: 2025-05-12
Payer: COMMERCIAL

## 2025-06-04 ENCOUNTER — TRANSFERRED RECORDS (OUTPATIENT)
Dept: HEALTH INFORMATION MANAGEMENT | Facility: CLINIC | Age: 89
End: 2025-06-04
Payer: COMMERCIAL

## 2025-09-02 DIAGNOSIS — E78.5 HYPERLIPIDEMIA LDL GOAL <130: ICD-10-CM

## 2025-09-02 DIAGNOSIS — I10 ESSENTIAL HYPERTENSION, BENIGN: ICD-10-CM

## 2025-09-03 RX ORDER — AMLODIPINE BESYLATE 2.5 MG/1
2.5 TABLET ORAL DAILY
Qty: 100 TABLET | Refills: 2 | OUTPATIENT
Start: 2025-09-03

## 2025-09-03 RX ORDER — SIMVASTATIN 20 MG
20 TABLET ORAL AT BEDTIME
Qty: 100 TABLET | Refills: 2 | OUTPATIENT
Start: 2025-09-03

## (undated) DEVICE — SU VICRYL 3-0 SH 27" UND J416H

## (undated) DEVICE — SU VICRYL 4-0 PS-2 18" UND J496H

## (undated) DEVICE — ESU ELEC BLADE 2.75" COATED/INSULATED E1455

## (undated) DEVICE — PREP CHLORAPREP 26ML TINTED ORANGE  260815

## (undated) DEVICE — PACK MINOR CUSTOM RIDGES SBA32RMRMA

## (undated) DEVICE — GLOVE PROTEXIS BLUE W/NEU-THERA 6.5  2D73EB65

## (undated) DEVICE — SU DERMABOND MINI DHVM12

## (undated) DEVICE — SOL NACL 0.9% IRRIG 1000ML BOTTLE 2F7124

## (undated) DEVICE — GLOVE ESTEEM POWDER FREE SMT 6.5  2D72PT65

## (undated) DEVICE — GLOVE PROTEXIS BLUE W/NEU-THERA 7.0  2D73EB70

## (undated) DEVICE — LINEN HALF SHEET 5512

## (undated) DEVICE — ESU GROUND PAD ADULT W/CORD E7507

## (undated) DEVICE — LINEN FULL SHEET 5511

## (undated) DEVICE — GLOVE PROTEXIS POWDER FREE SMT 6.5  2D72PT65X

## (undated) DEVICE — BAG CLEAR TRASH 1.3M 39X33" P4040C

## (undated) DEVICE — SU VICRYL 2-0 SH 27" J317H

## (undated) DEVICE — NDL 22GA 1.5"

## (undated) DEVICE — SU VICRYL 0 UR-6 27" J603H

## (undated) DEVICE — LINEN TOWEL PACK X10 5473

## (undated) DEVICE — SU PDS II 0 CT-2 27" Z334H

## (undated) DEVICE — TUBING SUCTION 12"X1/4" N612

## (undated) DEVICE — SU VICRYL 2-0 TIE 12X18" J905T

## (undated) DEVICE — CLEANSER WOUND IRRISEPT 0.05% CHG IRRISEPT-403

## (undated) DEVICE — SUCTION TIP YANKAUER W/O VENT K86

## (undated) DEVICE — DRAPE LAP W/ARMBOARD 29410

## (undated) RX ORDER — LIDOCAINE HYDROCHLORIDE 10 MG/ML
INJECTION, SOLUTION EPIDURAL; INFILTRATION; INTRACAUDAL; PERINEURAL
Status: DISPENSED
Start: 2017-09-21

## (undated) RX ORDER — CEFAZOLIN SODIUM 2 G/100ML
INJECTION, SOLUTION INTRAVENOUS
Status: DISPENSED
Start: 2017-09-21

## (undated) RX ORDER — FENTANYL CITRATE 50 UG/ML
INJECTION, SOLUTION INTRAMUSCULAR; INTRAVENOUS
Status: DISPENSED
Start: 2017-09-21

## (undated) RX ORDER — BUPIVACAINE HYDROCHLORIDE 5 MG/ML
INJECTION, SOLUTION EPIDURAL; INTRACAUDAL; PERINEURAL
Status: DISPENSED
Start: 2025-03-26

## (undated) RX ORDER — BUPIVACAINE HYDROCHLORIDE 2.5 MG/ML
INJECTION, SOLUTION EPIDURAL; INFILTRATION; INTRACAUDAL
Status: DISPENSED
Start: 2017-09-21

## (undated) RX ORDER — METOPROLOL TARTRATE 1 MG/ML
INJECTION, SOLUTION INTRAVENOUS
Status: DISPENSED
Start: 2017-09-21

## (undated) RX ORDER — PROPOFOL 10 MG/ML
INJECTION, EMULSION INTRAVENOUS
Status: DISPENSED
Start: 2017-09-21

## (undated) RX ORDER — IBUPROFEN 600 MG/1
TABLET, FILM COATED ORAL
Status: DISPENSED
Start: 2017-09-21

## (undated) RX ORDER — ONDANSETRON 2 MG/ML
INJECTION INTRAMUSCULAR; INTRAVENOUS
Status: DISPENSED
Start: 2017-09-21

## (undated) RX ORDER — GLYCOPYRROLATE 0.2 MG/ML
INJECTION INTRAMUSCULAR; INTRAVENOUS
Status: DISPENSED
Start: 2017-09-21

## (undated) RX ORDER — DEXAMETHASONE SODIUM PHOSPHATE 4 MG/ML
INJECTION, SOLUTION INTRA-ARTICULAR; INTRALESIONAL; INTRAMUSCULAR; INTRAVENOUS; SOFT TISSUE
Status: DISPENSED
Start: 2017-09-21